# Patient Record
Sex: MALE | Race: WHITE | Employment: FULL TIME | URBAN - METROPOLITAN AREA
[De-identification: names, ages, dates, MRNs, and addresses within clinical notes are randomized per-mention and may not be internally consistent; named-entity substitution may affect disease eponyms.]

---

## 2017-04-17 RX ORDER — CEFAZOLIN SODIUM IN 0.9 % NACL 2 G/50 ML
2 INTRAVENOUS SOLUTION, PIGGYBACK (ML) INTRAVENOUS ONCE
Status: CANCELLED | OUTPATIENT
Start: 2017-04-17 | End: 2017-04-17

## 2017-04-17 NOTE — H&P
[facility]  Pre Operative History and Physical Exam    Patient ID:  Soniya Livingston  [id number]  42 y.o.  1950    Today: April 17, 2017          CC:  Left  Knee pain    HPI:   The patient has end stage arthritis of the left knee. The patient was evaluated and examined during a consultation prior to today. The patient complains of knee pain with activities, reports pain as mostly occurring along the joint lines, reports stiffness of the knee with prolonged inactivity, and swelling/pain at the end of the day and after increased physical activity. The pain affects the patients activities of daily living and quality of life. The patient has attempted and exhausted conservative treatment. There have been no changes to the patient's orthopedic condition since the last office visit. Past Medical History:  No past medical history on file. Past Surgical History:  No past surgical history on file. Medications:     Prior to Admission medications    Not on File       Family History:   No family history on file. Social History:      Social History   Substance Use Topics    Smoking status: Not on file    Smokeless tobacco: Not on file    Alcohol use Not on file         Allergies: Allergies not on file     Vitals: There were no vitals taken for this visit. Objective:         General: No Acute distress                   HEENT: Normocephalic/atramatic                   Lungs:  Breathing non-labored                   Heart:   RRR                    Abdomen: soft       Extremities:  Pain with ROM of the left knee. Trace effusion. Crepitus present. Distally the patient shows no neurologic deficit. Antalgic gait appreciated. Meds:   No current outpatient prescriptions on file. No current facility-administered medications for this encounter. There is no problem list on file for this patient. Assessment:   1.  Arthritis of the Left knee    Plan:   The patient has failed previous conservative treatment for this condition including anti-inflammatories, injections and lifestyle modifications. The necessity for joint replacement is present. Risks, benefits, alternatives and possible complications of left knee arthroplasty have been discussed with the patient including but not limited to infection, bleeding, damage to nerves and/or blood vessels, stiffness of the knee after surgery, potential for patellar maltracking, potential for fracture both intra-op and post-op, polyethylene wear, implant loosening, risk for revision surgery should any of the above occur, venous thrombo-embolism including deep vein thrombosis and pulmonary embolism, and potential for continued pain after surgery. We have also previously discussed the potential of morbidity and mortality associated with, but not limited to, the actual surgical procedure, anesthesia, prior medical conditions, and/or the administration of medications perioperatively. The patient has been given the opportunity to ask questions all of which have been answered and the patient wishes to proceed. The patient will be admitted the day of surgery for left total knee replacement.         Signed By: Radha Lund MD  April 17, 2017

## 2017-04-24 ENCOUNTER — HOSPITAL ENCOUNTER (OUTPATIENT)
Dept: PHYSICAL THERAPY | Age: 67
Discharge: HOME | End: 2017-04-24
Payer: COMMERCIAL

## 2017-04-24 ENCOUNTER — HOSPITAL ENCOUNTER (OUTPATIENT)
Dept: SURGERY | Age: 67
Discharge: HOME | End: 2017-04-24
Payer: COMMERCIAL

## 2017-04-24 VITALS
BODY MASS INDEX: 32.23 KG/M2 | HEIGHT: 72 IN | TEMPERATURE: 96.8 F | HEART RATE: 54 BPM | WEIGHT: 238 LBS | OXYGEN SATURATION: 96 % | RESPIRATION RATE: 18 BRPM | SYSTOLIC BLOOD PRESSURE: 119 MMHG | DIASTOLIC BLOOD PRESSURE: 61 MMHG

## 2017-04-24 PROBLEM — R94.4 DECREASED GFR: Status: ACTIVE | Noted: 2017-04-24

## 2017-04-24 PROBLEM — R79.89 ELEVATED SERUM CREATININE: Status: ACTIVE | Noted: 2017-04-24

## 2017-04-24 LAB
ALBUMIN SERPL BCP-MCNC: 4.1 G/DL (ref 3.2–4.6)
ANION GAP BLD CALC-SCNC: 8 MMOL/L (ref 7–16)
APPEARANCE UR: CLEAR
APTT PPP: 23.8 SEC (ref 23.5–31.7)
ATRIAL RATE: 54 BPM
BACTERIA SPEC CULT: NORMAL
BASOPHILS # BLD AUTO: 0.1 K/UL (ref 0–0.2)
BASOPHILS # BLD: 1 % (ref 0–2)
BILIRUB UR QL: NEGATIVE
BUN SERPL-MCNC: 26 MG/DL (ref 8–23)
CALCIUM SERPL-MCNC: 9.5 MG/DL (ref 8.3–10.4)
CALCULATED P AXIS, ECG09: 20 DEGREES
CALCULATED R AXIS, ECG10: 64 DEGREES
CALCULATED T AXIS, ECG11: 54 DEGREES
CHLORIDE SERPL-SCNC: 104 MMOL/L (ref 98–107)
CO2 SERPL-SCNC: 28 MMOL/L (ref 21–32)
COLOR UR: YELLOW
CREAT SERPL-MCNC: 2.01 MG/DL (ref 0.8–1.5)
DIAGNOSIS, 93000: NORMAL
DIFFERENTIAL METHOD BLD: ABNORMAL
EOSINOPHIL # BLD: 0.1 K/UL (ref 0–0.8)
EOSINOPHIL NFR BLD: 1 % (ref 0.5–7.8)
ERYTHROCYTE [DISTWIDTH] IN BLOOD BY AUTOMATED COUNT: 12.9 % (ref 11.9–14.6)
EST. AVERAGE GLUCOSE BLD GHB EST-MCNC: 126 MG/DL
GLUCOSE SERPL-MCNC: 95 MG/DL (ref 65–100)
GLUCOSE UR STRIP.AUTO-MCNC: NEGATIVE MG/DL
HBA1C MFR BLD: 6 % (ref 4.8–6)
HCT VFR BLD AUTO: 41.3 % (ref 41.1–50.3)
HGB BLD-MCNC: 13.7 G/DL (ref 13.6–17.2)
HGB UR QL STRIP: NEGATIVE
IMM GRANULOCYTES # BLD: 0 K/UL (ref 0–0.5)
IMM GRANULOCYTES NFR BLD AUTO: 0.3 % (ref 0–5)
INR PPP: 1 (ref 0.9–1.2)
KETONES UR QL STRIP.AUTO: NEGATIVE MG/DL
LEUKOCYTE ESTERASE UR QL STRIP.AUTO: NEGATIVE
LYMPHOCYTES # BLD AUTO: 35 % (ref 13–44)
LYMPHOCYTES # BLD: 3.1 K/UL (ref 0.5–4.6)
MCH RBC QN AUTO: 28.7 PG (ref 26.1–32.9)
MCHC RBC AUTO-ENTMCNC: 33.2 G/DL (ref 31.4–35)
MCV RBC AUTO: 86.6 FL (ref 79.6–97.8)
MONOCYTES # BLD: 1.3 K/UL (ref 0.1–1.3)
MONOCYTES NFR BLD AUTO: 14 % (ref 4–12)
NEUTS SEG # BLD: 4.3 K/UL (ref 1.7–8.2)
NEUTS SEG NFR BLD AUTO: 49 % (ref 43–78)
NITRITE UR QL STRIP.AUTO: NEGATIVE
P-R INTERVAL, ECG05: 158 MS
PH UR STRIP: 6.5 [PH] (ref 5–9)
PLATELET # BLD AUTO: 218 K/UL (ref 150–450)
PMV BLD AUTO: 12.2 FL (ref 10.8–14.1)
POTASSIUM SERPL-SCNC: 4.1 MMOL/L (ref 3.5–5.1)
PROT UR STRIP-MCNC: NEGATIVE MG/DL
PROTHROMBIN TIME: 10.9 SEC (ref 9.6–12)
Q-T INTERVAL, ECG07: 410 MS
QRS DURATION, ECG06: 96 MS
QTC CALCULATION (BEZET), ECG08: 388 MS
RBC # BLD AUTO: 4.77 M/UL (ref 4.23–5.67)
SERVICE CMNT-IMP: NORMAL
SODIUM SERPL-SCNC: 140 MMOL/L (ref 136–145)
SP GR UR REFRACTOMETRY: 1.01 (ref 1–1.02)
UROBILINOGEN UR QL STRIP.AUTO: 1 EU/DL (ref 0.2–1)
VENTRICULAR RATE, ECG03: 54 BPM
WBC # BLD AUTO: 8.9 K/UL (ref 4.3–11.1)

## 2017-04-24 PROCEDURE — 80048 BASIC METABOLIC PNL TOTAL CA: CPT | Performed by: ORTHOPAEDIC SURGERY

## 2017-04-24 PROCEDURE — 80307 DRUG TEST PRSMV CHEM ANLYZR: CPT | Performed by: ORTHOPAEDIC SURGERY

## 2017-04-24 PROCEDURE — 82040 ASSAY OF SERUM ALBUMIN: CPT | Performed by: ORTHOPAEDIC SURGERY

## 2017-04-24 PROCEDURE — 97161 PT EVAL LOW COMPLEX 20 MIN: CPT

## 2017-04-24 PROCEDURE — 85730 THROMBOPLASTIN TIME PARTIAL: CPT | Performed by: ORTHOPAEDIC SURGERY

## 2017-04-24 PROCEDURE — 85025 COMPLETE CBC W/AUTO DIFF WBC: CPT | Performed by: ORTHOPAEDIC SURGERY

## 2017-04-24 PROCEDURE — 93005 ELECTROCARDIOGRAM TRACING: CPT | Performed by: ORTHOPAEDIC SURGERY

## 2017-04-24 PROCEDURE — 81003 URINALYSIS AUTO W/O SCOPE: CPT | Performed by: ORTHOPAEDIC SURGERY

## 2017-04-24 PROCEDURE — 83036 HEMOGLOBIN GLYCOSYLATED A1C: CPT | Performed by: ORTHOPAEDIC SURGERY

## 2017-04-24 PROCEDURE — 36415 COLL VENOUS BLD VENIPUNCTURE: CPT | Performed by: ORTHOPAEDIC SURGERY

## 2017-04-24 PROCEDURE — 77030027138 HC INCENT SPIROMETER -A

## 2017-04-24 PROCEDURE — 85610 PROTHROMBIN TIME: CPT | Performed by: ORTHOPAEDIC SURGERY

## 2017-04-24 PROCEDURE — 87641 MR-STAPH DNA AMP PROBE: CPT | Performed by: ORTHOPAEDIC SURGERY

## 2017-04-24 RX ORDER — EZETIMIBE 10 MG/1
10 TABLET ORAL DAILY
COMMUNITY

## 2017-04-24 RX ORDER — OLOPATADINE HYDROCHLORIDE 665 UG/1
2 SPRAY NASAL 2 TIMES DAILY
COMMUNITY

## 2017-04-24 RX ORDER — LISINOPRIL 10 MG/1
10 TABLET ORAL DAILY
COMMUNITY

## 2017-04-24 RX ORDER — ATORVASTATIN CALCIUM 40 MG/1
40 TABLET, FILM COATED ORAL
COMMUNITY

## 2017-04-24 RX ORDER — SULINDAC 150 MG/1
150 TABLET ORAL 2 TIMES DAILY
COMMUNITY

## 2017-04-24 RX ORDER — FLUOXETINE HYDROCHLORIDE 20 MG/1
20 CAPSULE ORAL DAILY
COMMUNITY

## 2017-04-24 RX ORDER — ISOSORBIDE MONONITRATE 30 MG/1
30 TABLET, EXTENDED RELEASE ORAL DAILY
COMMUNITY

## 2017-04-24 RX ORDER — FENOFIBRATE 160 MG/1
160 TABLET ORAL DAILY
COMMUNITY

## 2017-04-24 RX ORDER — METOPROLOL TARTRATE 25 MG/1
25 TABLET, FILM COATED ORAL 2 TIMES DAILY
COMMUNITY

## 2017-04-24 RX ORDER — ASPIRIN 81 MG/1
81 TABLET ORAL DAILY
COMMUNITY
End: 2017-05-19

## 2017-04-24 NOTE — PROGRESS NOTES
Regina Ferreira  : (40 y.o.) [facility]  [address], [address]  [phone]       Physical Therapy Prehab Plan of Treatment and Evaluation Summary:2017    ICD-10: Treatment Diagnosis:   · Pain in Left Knee (M25.562)  · Stiffness of Left Knee, Not elsewhere classified (M25.662)  · Difficulty in walking, Not elsewhere classified (R26.2)  Precautions/Allergies:   Neosporin [hydrocortisone] and Pcn [penicillins]  MEDICAL/REFERRING DIAGNOSIS:  Unilateral primary osteoarthritis, left knee [M17.12]  REFERRING PHYSICIAN: Gregory Cassidy MD  DATE OF SURGERY: 17   Assessment:   Comments:  Pt. Plans to go home with spouse. PROBLEM LIST (Impacting functional limitations):  Mr. Joselo Alexandre presents with the following left lower extremity(s) problems:  1. Strength  2. Range of Motion  3. Home Exercise Program  4. Pain   INTERVENTIONS PLANNED:  1. Home Exercise Program  2. Educational Discussion     TREATMENT PLAN: Effective Dates: 2017 TO 2017. Frequency/Duration: Patient to continue to perform home exercise program at least twice per day up until his surgery. GOALS: (Goals have been discussed and agreed upon with patient.)  Discharge Goals: Time Frame: 1 Day  1. Patient will demonstrate independence with a home exercise program designed to increase strength, range of motion and pain control to minimize functional deficits and optimize patient for total joint replacement. Rehabilitation Potential For Stated Goals: Good  Regarding Cherelle Rousseau's therapy, I certify that the treatment plan above will be carried out by a therapist or under their direction.   Thank you for this referral,  Elkin Funk, PT               HISTORY:   Present Symptoms:  Pain Intensity 1:  (10 at worst)  Pain Location 1: Knee   History of Present Injury/Illness (Reason for Referral):  Medical/Referring Diagnosis: Unilateral primary osteoarthritis, left knee [M17.12] Past Medical History/Comorbidities:   Mr. Saima Sánchez  has a past medical history of CAD (coronary artery disease); Depression; Hypercholesteremia; Hypertension; and Osteoarthritis. He also has no past medical history of Adverse effect of anesthesia; Difficult intubation; Malignant hyperthermia due to anesthesia; Nausea & vomiting; or Pseudocholinesterase deficiency. Mr. Saima Sánchez  has a past surgical history that includes urological (2013) and colonoscopy. Social History/Living Environment:   Home Environment: Private residence  # Steps to Enter: 2  Rails to Enter: Yes  Hand Rails : Right  One/Two Story Residence: One story  Living Alone: No  Support Systems: Spouse/Significant Other/Partner  Patient Expects to be Discharged to[de-identified] Private residence  Current DME Used/Available at Home: Com[employer], bedside  Tub or Shower Type: Shower  Work/Activity:  sales  Dominant Side:  RIGHT  Current Medications:  See Pre-assessment nursing note   Number of Personal Factors/Comorbidities that affect the Plan of Care: 0: LOW COMPLEXITY   EXAMINATION:   ADLs (Current Functional Status):   Ambulation:  [x] Independent  [] Walk Indoors Only  [] Walk Outdoors  [] Use Assistive Device  [] Use Wheelchair Only Dressing:  [x] [address] from Someone for:  [] Sock/Shoes  [] Pants  [] Everything   Bathing/Showering:   [x] Independent  [] Requires Assistance from Someone  [] [address]:  [x] Routine house and yard work  [] Light Housework Only  [] None   Observation/Orthostatic Postural Assessment:   Exceptions to WDLGenu varus left  ROM/Flexibility:   Gross Assessment: Yes  AROM: Within functional limits (right LE)                LLE Assessment  LLE Assessment (WDL): Exception to WDL  LLE AROM  L Knee Flexion: 125  L Knee Extension: 3          Strength:   Gross Assessment: Yes  Strength:  Within functional limits (right LE)       LLE Strength  L Knee Flexion: 4  L Knee Extension: 4          Functional Mobility:    Gross Assessment: Yes    Gait Description (WDL): Exceptions to WDL  Stand to Sit: Independent  Sit to Stand: Independent  Bed to Chair: Independent  Distance (ft): 250 Feet (ft)  Ambulation - Level of Assistance: Independent  Stance: Left decreased  Gait Abnormalities: Antalgic          Balance:    Sitting: Intact  Standing: Intact   Body Structures Involved:  1. Bones  2. Joints  3. Muscles  4. Ligaments Body Functions Affected:  1. Movement Related Activities and Participation Affected:  1. Mobility   Number of elements that affect the Plan of Care: 1-2: LOW COMPLEXITY   CLINICAL PRESENTATION:   Presentation: Stable and uncomplicated: LOW COMPLEXITY   CLINICAL DECISION MAKING:   Outcome Measure: Tool Used: Lower Extremity Functional Scale (LEFS)  Score:  Initial: 41/80 Most Recent: X/80 (Date: -- )   Interpretation of Score: 20 questions each scored on a 5 point scale with 0 representing \"extreme difficulty or unable to perform\" and 4 representing \"no difficulty\". The lower the score, the greater the functional disability. 80/80 represents no disability. Minimal detectable change is 9 points. Score 80 79-65 64-49 48-33 32-17 16-1 0   Modifier CH CI CJ CK CL CM CN     ? Mobility - Walking and Moving Around:     - CURRENT STATUS: CK - 40%-59% impaired, limited or restricted    - GOAL STATUS: CK - 40%-59% impaired, limited or restricted    - D/C STATUS:  CK - 40%-59% impaired, limited or restricted  Medical Necessity:   · Mr. Keo Ashley is expected to optimize his lower extremity strength and ROM in preparation for joint replacement surgery. Reason for Services/Other Comments:  · Achieve baseline assesment of musculoskeletal system, functional mobility and home environment. , educate in PT HEP in preparation for surgery, educate in hospital plan of care.    Use of outcome tool(s) and clinical judgement create a POC that gives a: Clear prediction of patient's progress: LOW COMPLEXITY TREATMENT:   Treatment/Session Assessment:  Patient was instructed in PT- HEP to increase strength and ROM in LEs. Answered all questions. · Post session pain:  No complaints  · Compliance with Program/Exercises: compliant most of the time.   Total Treatment Duration:  PT Patient Time In/Time Out  Time In: 1140  Time Out: [id number] Kristie Rosales

## 2017-04-24 NOTE — PERIOP NOTES
Dr. Allen Sousa (anesthesia) reviewed elevated Creatinine results. Per Dr. Allen Sousa patient is to be seen by PCP, Davina Brooks, PRIOR to surgery to have elevated Creatinine addressed. Voicemail left with Gideon Amos, assistant to Dr. Barak Arnett, stating the above.

## 2017-04-24 NOTE — PERIOP NOTES
Patient verified name, , and surgery as listed in [facility]. Type 3 surgery, PAT joint assessment complete. Labs per surgeon: mrsa/mssa swab, nicotine/cotinine, hemoglobin A1c, Albumin, ua, bmp, ptt, pt/inr, cbc, T&S DOS; results pending. Nicotine/cotinine results pending-to be followed up by charge nurse. Labs per anesthesia protocol: All required lab work included in surgeon's orders. EKG: completed 17 results within anesthesia limits. Previous cardiology note (10/22/15) placed on chart for anesthesia reference. Previous EKG, Echo report, and stress report requested from [facility] to be faxed to [phone]. Patient has an appointment scheduled with [facility] on 5/3/17 at 1400 for Cardiac Clearance. Charge nurse to print clearance note and place on chart. Hibiclens and instructions given per hospital policy. Nasal Swab collected per MD order and instructions for Mupirocin nasal ointment if required. Patient provided with handouts including Guide to Surgery, Pain Management, Hand Hygiene, Blood Transfusion Education, and [facility] Brochure. Patient answered medical/surgical history questions at their best of ability. All prior to admission medications documented in [facility]. Original medication prescription bottle NOT visualized during patient appointment. Patient instructed to hold all vitamins 7 days prior to surgery and NSAIDS 5 days prior to surgery. Medications to be held prior to surgery: Sulindac. Patient instructed to continue previous medications as prescribed prior to surgery and to take the following medications the day of surgery according to anesthesia guidelines with a small sip of water: Metoprolol, Olopatadine nasal spray, Fluoxetine, Isosorbide, and 81 mg ASA. Patient instructed to bring nasal spray, Sulindac, and Myrbetriq (in the original bottles) to the hospital on the DOS and give to nurse. Patient taught back and verbalized understanding.

## 2017-04-24 NOTE — PROGRESS NOTES
04/24/17 1200   Oxygen Therapy   O2 Sat (%) 97 %   Pulse via Oximetry 57 beats per minute   O2 Device Room air   Pre-Treatment   Breath Sounds Bilateral Clear   Pre FEV1 (liters) 3.1 liters   % Predicted 84   Incentive Spirometry Treatment   Actual Volume (ml) 3500 ml     Initial respiratory Assessment completed with pt. Pt was interviewed and evaluated in [facility] prior to surgery. Patient ID:  Azael Padron  [id number]  23 y.o.  1950  Surgeon: Vale Baugh  Date of Surgery: 5/17/2017  Procedure: Total Left Knee Arthroplasty  Primary Care Physician: Raven Hamilton [phone]  Specialists:                                  Pt instructed in the use of Incentive Spirometry. Pt instructed to bring Incentive Spirometer back on date of surgery & to start using Is upon return to pt room.     Pt taught proper cough technique      History of smoking:   PIPE AND CIGAR 10 TIMES PER DAY     Quit date:18 YEARS AGO    Secondhand smoke:PARENTS      Past procedures with Oxygen desaturation:NONE    Past Medical History:   Diagnosis Date    Benign prostatic hyperplasia     Followed y Dr. Ravi Ellis CAD (coronary artery disease)     followed by [facility]     Depression     Diastolic dysfunction     Grade 1     Elevated serum creatinine 04/24/2017    Creatinine 2.01    Former smoker     H/O echocardiogram 07/24/2014    EF 60-65%    Hypercholesteremia     Hypertension     Osteoarthritis     Overactive bladder                                     ENT:SINUS                                                                                                                      Respiratory history:                                  DENIES SOB                                  Respiratory meds:                                                         FAMILY PRESENT:             YES       -WIFE                                        PAST SLEEP STUDY:              NO  HX OF JESSICA:                              NO JESSICA assessment:                                               SLEEPS ON   BACK                                                    PHYSICAL EXAM   Body mass index is 32.28 kg/(m^2).    Visit Vitals    /61    Pulse (!) 54    Temp 96.8 °F (36 °C)    Resp 18    Ht 6' (1.829 m)    Wt 108 kg (238 lb)    SpO2 96%    BMI 32.28 kg/m2     Neck circumference:  44    cm    Loud snoring:YES      Witnessed apnea or wakening gasping or choking:,DENIES,     Awakens with headaches:DENIES    Morning or daytime tiredness/ sleepiness: DENIES  - NAPS ONCE A DAY FOR 30 -40 MIN    Dry mouth or sore throat in morning:DENIES    Freidman stage:4    SACS score:25    STOP/BAN      Sleepiness Scale:     Sitting and reading 3    Watching TV 2    Sitting inactive in a public place 0    As a passenger in a car for an hour without a break 3    Lying down to rest in the afternoon when circumstances Permits 3    Sitting and talking to someone 0    Sitting quietly after lunch without alcohol 3    In a car, while stopped for a few minutes 0    Total :  14                          CPAP:NONE               CONT SAT HS         Referrals:    Pt. Phone Number:

## 2017-04-24 NOTE — PERIOP NOTES
Recent Results (from the past 12 hour(s))   CBC WITH AUTOMATED DIFF    Collection Time: 04/24/17 11:59 AM   Result Value Ref Range    WBC 8.9 4.3 - 11.1 K/uL    RBC 4.77 4.23 - 5.67 M/uL    HGB 13.7 13.6 - 17.2 g/dL    HCT 41.3 41.1 - 50.3 %    MCV 86.6 79.6 - 97.8 FL    MCH 28.7 26.1 - 32.9 PG    MCHC 33.2 31.4 - 35.0 g/dL    RDW 12.9 11.9 - 14.6 %    PLATELET 283 375 - 824 K/uL    MPV 12.2 10.8 - 14.1 FL    DF AUTOMATED      NEUTROPHILS 49 43 - 78 %    LYMPHOCYTES 35 13 - 44 %    MONOCYTES 14 (H) 4.0 - 12.0 %    EOSINOPHILS 1 0.5 - 7.8 %    BASOPHILS 1 0.0 - 2.0 %    IMMATURE GRANULOCYTES 0.3 0.0 - 5.0 %    ABS. NEUTROPHILS 4.3 1.7 - 8.2 K/UL    ABS. LYMPHOCYTES 3.1 0.5 - 4.6 K/UL    ABS. MONOCYTES 1.3 0.1 - 1.3 K/UL    ABS. EOSINOPHILS 0.1 0.0 - 0.8 K/UL    ABS. BASOPHILS 0.1 0.0 - 0.2 K/UL    ABS. IMM.  GRANS. 0.0 0.0 - 0.5 K/UL   PROTHROMBIN TIME + INR    Collection Time: 04/24/17 11:59 AM   Result Value Ref Range    Prothrombin time 10.9 9.6 - 12.0 sec    INR 1.0 0.9 - 1.2     PTT    Collection Time: 04/24/17 11:59 AM   Result Value Ref Range    aPTT 23.8 23.5 - 20.1 SEC   METABOLIC PANEL, BASIC    Collection Time: 04/24/17 11:59 AM   Result Value Ref Range    Sodium 140 136 - 145 mmol/L    Potassium 4.1 3.5 - 5.1 mmol/L    Chloride 104 98 - 107 mmol/L    CO2 28 21 - 32 mmol/L    Anion gap 8 7 - 16 mmol/L    Glucose 95 65 - 100 mg/dL    BUN 26 (H) 8 - 23 MG/DL    Creatinine 2.01 (H) 0.8 - 1.5 MG/DL    GFR est AA 43 (L) >60 ml/min/1.73m2    GFR est non-AA 36 (L) >60 ml/min/1.73m2    Calcium 9.5 8.3 - 10.4 MG/DL   URINALYSIS W/ RFLX MICROSCOPIC    Collection Time: 04/24/17 11:59 AM   Result Value Ref Range    Color YELLOW      Appearance CLEAR      Specific gravity 1.013 1.001 - 1.023      pH (UA) 6.5 5.0 - 9.0      Protein NEGATIVE  NEG mg/dL    Glucose NEGATIVE  mg/dL    Ketone NEGATIVE  NEG mg/dL    Bilirubin NEGATIVE  NEG      Blood NEGATIVE  NEG      Urobilinogen 1.0 0.2 - 1.0 EU/dL    Nitrites NEGATIVE  NEG Leukocyte Esterase NEGATIVE  NEG     ALBUMIN    Collection Time: 04/24/17 11:59 AM   Result Value Ref Range    Albumin 4.1 3.2 - 4.6 g/dL   EKG, 12 LEAD, INITIAL    Collection Time: 04/24/17  1:32 PM   Result Value Ref Range    Ventricular Rate 54 BPM    Atrial Rate 54 BPM    P-R Interval 158 ms    QRS Duration 96 ms    Q-T Interval 410 ms    QTC Calculation (Bezet) 388 ms    Calculated P Axis 20 degrees    Calculated R Axis 64 degrees    Calculated T Axis 54 degrees    Diagnosis       Sinus bradycardia  Otherwise normal ECG  No previous ECGs available

## 2017-04-24 NOTE — PERIOP NOTES
Labs dated 4/24/17 routed via [address] [place] [address] to patients PCP, Ayden Bauer, per Dr. Gorge Lee request.

## 2017-04-24 NOTE — PERIOP NOTES
Creatinine results to be reviewed by anesthesia; previous CMP results (1/23/17) placed on chart for comparison. All other lab results within anesthesia limits. Elevated Creatinine reported to [facility] per protocol. No new orders received.

## 2017-04-24 NOTE — ADVANCED PRACTICE NURSE
Total Joint Surgery Preoperative Chart Review      Patient ID:  Junior Barr  [id number]  34 y.o.  1950  Surgeon: Dr Jeison Lindquist  Date of Surgery: 5/17/2017  Procedure: Total Left Knee Arthroplasty  Primary Care Physician: Tennille Trujillo [phone]  Specialty Physician(s):      Subjective:   Junior Barr is a 73 y.o. WHITE OR  male who presents for preoperative evaluation for Total Left Knee arthroplasty. This is a preoperative chart review note based on data collected by the nurse at the surgical Pre-Assessment visit. Past Medical History:   Diagnosis Date    Benign prostatic hyperplasia     Followed y Dr. Ann Briceño CAD (coronary artery disease)     [facility] [address] [facility]     Depression     Diastolic dysfunction     Grade 1     Elevated serum creatinine 04/24/2017    Creatinine 2.01    Former smoker     H/O echocardiogram 07/24/2014    EF 60-65%    Hypercholesteremia     Hypertension     Osteoarthritis     Overactive bladder       Past Surgical History:   Procedure Laterality Date    HX COLONOSCOPY      with polyp removal-benign     HX UROLOGICAL  2013    Greenlight laser of prostate. Family History   Problem Relation Age of Onset    Heart Disease Father     Hypertension Father       Social History   Substance Use Topics    Smoking status: Former Smoker     Packs/day: 1.00     Years: 20.00    Smokeless tobacco: Never Used      Comment: quit 1998     Alcohol use No       Prior to Admission medications    Medication Sig Start Date End Date Taking? Authorizing Provider   metoprolol tartrate (LOPRESSOR) 25 mg tablet Take 25 mg by mouth two (2) times a day. Take DOS per anesthesia protocol. Yes Historical Provider   sulindac (CLINORIL) 150 mg tablet Take 150 mg by mouth two (2) times a day. Non-formulary. Patient instructed to bring to the hospital on the DOS, in the original bottle, and give to nurse.    Yes Historical Provider   olopatadine (PATANASE) 0.6 % spry 2 Squirts by Both Nostrils route two (2) times a day. Non-formulary. Patient instructed to bring to the hospital on the DOS, in the original bottle, and give to nurse. Yes Historical Provider   mirabegron ER (MYRBETRIQ) 50 mg ER tablet Take 50 mg by mouth nightly. Non-formulary. Patient instructed to bring to the hospital on the DOS, in the original bottle, and give to nurse. Yes Historical Provider   atorvastatin (LIPITOR) 40 mg tablet Take 40 mg by mouth nightly. Yes Historical Provider   lisinopril (PRINIVIL, ZESTRIL) 10 mg tablet Take 10 mg by mouth daily. Yes Historical Provider   isosorbide mononitrate ER (IMDUR) 30 mg tablet Take 30 mg by mouth daily. Take DOS per anesthesia protocol. Yes Historical Provider   ezetimibe (ZETIA) 10 mg tablet Take 10 mg by mouth daily. Yes Historical Provider   fenofibrate (LOFIBRA) 160 mg tablet Take 160 mg by mouth daily. Yes Historical Provider   FLUoxetine (PROZAC) 20 mg capsule Take 20 mg by mouth daily. Take DOS per anesthesia protocol. Yes Historical Provider   aspirin delayed-release 81 mg tablet Take 81 mg by mouth daily. Take DOS per anesthesia protocol.    Yes Historical Provider     Allergies   Allergen Reactions    Neosporin [Hydrocortisone] Other (comments)     \"it has a reverse effect\"     Pcn [Penicillins] Other (comments)     Light headed and starts to pass out           Objective:     Physical Exam:   Patient Vitals for the past 24 hrs:   Temp Pulse Resp BP SpO2   04/24/17 1328 - - - 119/61 -   04/24/17 1326 96.8 °F (36 °C) (!) 54 18 - 96 %   04/24/17 1200 - - - - 97 %       ECG:    EKG Results     Procedure 720 Value Units Date/Time    EKG, 12 LEAD, INITIAL [[id number]] Collected:  04/24/17 1332    Order Status:  Completed Updated:  04/24/17 1442     Ventricular Rate 54 BPM      Atrial Rate 54 BPM      P-R Interval 158 ms      QRS Duration 96 ms      Q-T Interval 410 ms      QTC Calculation (Bezet) 388 ms      Calculated P Axis 20 degrees Calculated R Axis 64 degrees      Calculated T Axis 54 degrees      Diagnosis --     Sinus bradycardia  Otherwise normal ECG  No previous ECGs available  Confirmed by ST MELISSA PHELPS MD (), ANUPAM AGUILAR ([id number]) on 4/24/2017 2:42:24 PM            Data Review:   Labs:   Recent Results (from the past 24 hour(s))   CBC WITH AUTOMATED DIFF    Collection Time: 04/24/17 11:59 AM   Result Value Ref Range    WBC 8.9 4.3 - 11.1 K/uL    RBC 4.77 4.23 - 5.67 M/uL    HGB 13.7 13.6 - 17.2 g/dL    HCT 41.3 41.1 - 50.3 %    MCV 86.6 79.6 - 97.8 FL    MCH 28.7 26.1 - 32.9 PG    MCHC 33.2 31.4 - 35.0 g/dL    RDW 12.9 11.9 - 14.6 %    PLATELET 341 011 - 240 K/uL    MPV 12.2 10.8 - 14.1 FL    DF AUTOMATED      NEUTROPHILS 49 43 - 78 %    LYMPHOCYTES 35 13 - 44 %    MONOCYTES 14 (H) 4.0 - 12.0 %    EOSINOPHILS 1 0.5 - 7.8 %    BASOPHILS 1 0.0 - 2.0 %    IMMATURE GRANULOCYTES 0.3 0.0 - 5.0 %    ABS. NEUTROPHILS 4.3 1.7 - 8.2 K/UL    ABS. LYMPHOCYTES 3.1 0.5 - 4.6 K/UL    ABS. MONOCYTES 1.3 0.1 - 1.3 K/UL    ABS. EOSINOPHILS 0.1 0.0 - 0.8 K/UL    ABS. BASOPHILS 0.1 0.0 - 0.2 K/UL    ABS. IMM.  GRANS. 0.0 0.0 - 0.5 K/UL   PROTHROMBIN TIME + INR    Collection Time: 04/24/17 11:59 AM   Result Value Ref Range    Prothrombin time 10.9 9.6 - 12.0 sec    INR 1.0 0.9 - 1.2     PTT    Collection Time: 04/24/17 11:59 AM   Result Value Ref Range    aPTT 23.8 23.5 - 71.1 SEC   METABOLIC PANEL, BASIC    Collection Time: 04/24/17 11:59 AM   Result Value Ref Range    Sodium 140 136 - 145 mmol/L    Potassium 4.1 3.5 - 5.1 mmol/L    Chloride 104 98 - 107 mmol/L    CO2 28 21 - 32 mmol/L    Anion gap 8 7 - 16 mmol/L    Glucose 95 65 - 100 mg/dL    BUN 26 (H) 8 - 23 MG/DL    Creatinine 2.01 (H) 0.8 - 1.5 MG/DL    GFR est AA 43 (L) >60 ml/min/1.73m2    GFR est non-AA 36 (L) >60 ml/min/1.73m2    Calcium 9.5 8.3 - 10.4 MG/DL   URINALYSIS W/ RFLX MICROSCOPIC    Collection Time: 04/24/17 11:59 AM   Result Value Ref Range    Color YELLOW      Appearance CLEAR Specific gravity 1.013 1.001 - 1.023      pH (UA) 6.5 5.0 - 9.0      Protein NEGATIVE  NEG mg/dL    Glucose NEGATIVE  mg/dL    Ketone NEGATIVE  NEG mg/dL    Bilirubin NEGATIVE  NEG      Blood NEGATIVE  NEG      Urobilinogen 1.0 0.2 - 1.0 EU/dL    Nitrites NEGATIVE  NEG      Leukocyte Esterase NEGATIVE  NEG     ALBUMIN    Collection Time: 04/24/17 11:59 AM   Result Value Ref Range    Albumin 4.1 3.2 - 4.6 g/dL   HEMOGLOBIN A1C WITH EAG    Collection Time: 04/24/17 11:59 AM   Result Value Ref Range    Hemoglobin A1c 6.0 4.8 - 6.0 %    Est. average glucose 126 mg/dL   EKG, 12 LEAD, INITIAL    Collection Time: 04/24/17  1:32 PM   Result Value Ref Range    Ventricular Rate 54 BPM    Atrial Rate 54 BPM    P-R Interval 158 ms    QRS Duration 96 ms    Q-T Interval 410 ms    QTC Calculation (Bezet) 388 ms    Calculated P Axis 20 degrees    Calculated R Axis 64 degrees    Calculated T Axis 54 degrees    Diagnosis       Sinus bradycardia  Otherwise normal ECG  No previous ECGs available  Confirmed by ST MELISSA PHELPS MD (), ANUPAM AGUILAR ([id number]) on 4/24/2017 2:42:24 PM     MSSA/MRSA SC BY PCR, NASAL SWAB    Collection Time: 04/24/17  1:40 PM   Result Value Ref Range    Special Requests: NASAL O2      Culture result:        SA target not detected. A MRSA NEGATIVE, SA NEGATIVE test result does not preclude MRSA or SA nasal colonization. Problem List:  )  Patient Active Problem List   Diagnosis Code    Elevated serum creatinine R79.89    Decreased GFR R94.4       Total Joint Surgery Pre-Assessment Recommendations:         Patient will need to see PCP related to elevated creatinine. His creatinine has been elevated but not to this extent. Recommend continuous saturation monitoring hours of sleep, during hospitalization.           Signed By: CONNIE Joshi    April 24, 2017

## 2017-04-25 LAB
COTININE UR QL SCN: NEGATIVE NG/ML
DRUG SCREEN COMMENT:, 753798: NORMAL

## 2017-04-25 NOTE — PERIOP NOTES
Received call from [facility] at Dr. Munoz Bottom office. Per [facility] pt has been followed by [facility] for kidney issues. Requests pre assessment fax lab results from 4/24/17 assessment to Dr. Marycruz Juarez at [facility]. Pt to be evaluated by Dr. Marycruz Juarez prior to surgery. Recent Results (from the past 24 hour(s))   CBC WITH AUTOMATED DIFF    Collection Time: 04/24/17 11:59 AM   Result Value Ref Range    WBC 8.9 4.3 - 11.1 K/uL    RBC 4.77 4.23 - 5.67 M/uL    HGB 13.7 13.6 - 17.2 g/dL    HCT 41.3 41.1 - 50.3 %    MCV 86.6 79.6 - 97.8 FL    MCH 28.7 26.1 - 32.9 PG    MCHC 33.2 31.4 - 35.0 g/dL    RDW 12.9 11.9 - 14.6 %    PLATELET 684 106 - 052 K/uL    MPV 12.2 10.8 - 14.1 FL    DF AUTOMATED      NEUTROPHILS 49 43 - 78 %    LYMPHOCYTES 35 13 - 44 %    MONOCYTES 14 (H) 4.0 - 12.0 %    EOSINOPHILS 1 0.5 - 7.8 %    BASOPHILS 1 0.0 - 2.0 %    IMMATURE GRANULOCYTES 0.3 0.0 - 5.0 %    ABS. NEUTROPHILS 4.3 1.7 - 8.2 K/UL    ABS. LYMPHOCYTES 3.1 0.5 - 4.6 K/UL    ABS. MONOCYTES 1.3 0.1 - 1.3 K/UL    ABS. EOSINOPHILS 0.1 0.0 - 0.8 K/UL    ABS. BASOPHILS 0.1 0.0 - 0.2 K/UL    ABS. IMM.  GRANS. 0.0 0.0 - 0.5 K/UL   PROTHROMBIN TIME + INR    Collection Time: 04/24/17 11:59 AM   Result Value Ref Range    Prothrombin time 10.9 9.6 - 12.0 sec    INR 1.0 0.9 - 1.2     PTT    Collection Time: 04/24/17 11:59 AM   Result Value Ref Range    aPTT 23.8 23.5 - 80.3 SEC   METABOLIC PANEL, BASIC    Collection Time: 04/24/17 11:59 AM   Result Value Ref Range    Sodium 140 136 - 145 mmol/L    Potassium 4.1 3.5 - 5.1 mmol/L    Chloride 104 98 - 107 mmol/L    CO2 28 21 - 32 mmol/L    Anion gap 8 7 - 16 mmol/L    Glucose 95 65 - 100 mg/dL    BUN 26 (H) 8 - 23 MG/DL    Creatinine 2.01 (H) 0.8 - 1.5 MG/DL    GFR est AA 43 (L) >60 ml/min/1.73m2    GFR est non-AA 36 (L) >60 ml/min/1.73m2    Calcium 9.5 8.3 - 10.4 MG/DL   URINALYSIS W/ RFLX MICROSCOPIC    Collection Time: 04/24/17 11:59 AM   Result Value Ref Range    Color YELLOW      Appearance CLEAR      Specific gravity 1.013 1.001 - 1.023      pH (UA) 6.5 5.0 - 9.0      Protein NEGATIVE  NEG mg/dL    Glucose NEGATIVE  mg/dL    Ketone NEGATIVE  NEG mg/dL    Bilirubin NEGATIVE  NEG      Blood NEGATIVE  NEG      Urobilinogen 1.0 0.2 - 1.0 EU/dL    Nitrites NEGATIVE  NEG      Leukocyte Esterase NEGATIVE  NEG     ALBUMIN    Collection Time: 04/24/17 11:59 AM   Result Value Ref Range    Albumin 4.1 3.2 - 4.6 g/dL   HEMOGLOBIN A1C WITH EAG    Collection Time: 04/24/17 11:59 AM   Result Value Ref Range    Hemoglobin A1c 6.0 4.8 - 6.0 %    Est. average glucose 126 mg/dL   NICOTINE/COTININE, UR, QT    Collection Time: 04/24/17 11:59 AM   Result Value Ref Range    Cotinine Screen, urine NEGATIVE  Pumfmg=954 ng/mL    Drug Screen Comment: Comment     EKG, 12 LEAD, INITIAL    Collection Time: 04/24/17  1:32 PM   Result Value Ref Range    Ventricular Rate 54 BPM    Atrial Rate 54 BPM    P-R Interval 158 ms    QRS Duration 96 ms    Q-T Interval 410 ms    QTC Calculation (Bezet) 388 ms    Calculated P Axis 20 degrees    Calculated R Axis 64 degrees    Calculated T Axis 54 degrees    Diagnosis       Sinus bradycardia  Otherwise normal ECG  No previous ECGs available  Confirmed by ST MELISSA PHELPS MD (), ANUPAM AGUILAR ([id number]) on 4/24/2017 2:42:24 PM     MSSA/MRSA SC BY PCR, NASAL SWAB    Collection Time: 04/24/17  1:40 PM   Result Value Ref Range    Special Requests: NASAL O2      Culture result:        SA target not detected. A MRSA NEGATIVE, SA NEGATIVE test result does not preclude MRSA or SA nasal colonization.

## 2017-04-26 NOTE — PERIOP NOTES
Nicotine level =neg    Cotinine Screen, urine NEGATIVE   Ltxvbc=446 ng/mL Final   Drug Screen Comment: Comment

## 2017-05-04 NOTE — PERIOP NOTES
Pt evaluated by [facility] 5/3/17 by Dr. Kory Michele. Per office note 5/3/17: pt \" is an acceptable risk for surgery from a cardiac standpoint. No further cardiac testing is indicated at this time\". Note placed on chart. [facility] to verify pt has had pre op appointment to f/u elevated creatinine during pre assessment. Left detailed voicemail message for  to fax most recent office note and lab results to [phone]. Also asked to return call to [facility] if pt has not been evaluated by nephology as yet. Also noted in EMR office note dated 2/13/17 from Dr. Bridgette Blas (urology). In his office note he included recent lab results:     7/24/15  Creat=1.45  1/29/16   Creat= 1.43  7/18/16  Creat = 1.90  1/23/17  Creat= 1.52    Note placed on chart for reference.

## 2017-05-10 NOTE — PERIOP NOTES
Pt evaluated by [facility] 5/8/17. Per office note, pt with history of kidney dysfunction since 2007 with elevated creat 1.4. Creat increased to 2.0. Baseline over the last year has been 1.4-1.6. Kat Rice labs today (5/8/17). Stop sulindac. Repeat labs Thursday (5/11/17). Pt was told if creatinine remained 2 or higher, put off surgery until his creatinine declines back to his baseline. If creat better Thursday, my proceed with surgery. Flagged chart for Charge RN to follow up with repeat creat level 5/11/17.

## 2017-05-10 NOTE — PERIOP NOTES
[facility] medical records, left detailed voicemail message requesting office note from 5/8/17 and any lab reports from 5/8/17 be faxed to [phone] for anesthesia review pre op.

## 2017-05-12 NOTE — PERIOP NOTES
Received BMP done 5/11/17 at [facility]. Creat back at baseline (1.46). OK to proceed. Chart to 3rd ortho.

## 2017-05-16 ENCOUNTER — ANESTHESIA EVENT (OUTPATIENT)
Dept: SURGERY | Age: 67
DRG: 470 | End: 2017-05-16
Payer: COMMERCIAL

## 2017-05-17 ENCOUNTER — HOME HEALTH ADMISSION (OUTPATIENT)
Dept: HOME HEALTH SERVICES | Facility: HOME HEALTH | Age: 67
End: 2017-05-17
Payer: COMMERCIAL

## 2017-05-17 ENCOUNTER — ANESTHESIA (OUTPATIENT)
Dept: SURGERY | Age: 67
DRG: 470 | End: 2017-05-17
Payer: COMMERCIAL

## 2017-05-17 ENCOUNTER — HOSPITAL ENCOUNTER (INPATIENT)
Age: 67
LOS: 2 days | Discharge: HOME HEALTH | DRG: 470 | End: 2017-05-19
Attending: ORTHOPAEDIC SURGERY | Admitting: ORTHOPAEDIC SURGERY
Payer: COMMERCIAL

## 2017-05-17 PROBLEM — M17.9 OA (OSTEOARTHRITIS) OF KNEE: Status: ACTIVE | Noted: 2017-05-17

## 2017-05-17 LAB
ABO + RH BLD: NORMAL
BLOOD GROUP ANTIBODIES SERPL: NORMAL
GLUCOSE BLD STRIP.AUTO-MCNC: 94 MG/DL (ref 65–100)
HGB BLD-MCNC: 11.2 G/DL (ref 13.6–17.2)
SPECIMEN EXP DATE BLD: NORMAL

## 2017-05-17 PROCEDURE — 77030018836 HC SOL IRR NACL ICUM -A: Performed by: ORTHOPAEDIC SURGERY

## 2017-05-17 PROCEDURE — 77030012890

## 2017-05-17 PROCEDURE — 77030034479 HC ADH SKN CLSR PRINEO J&J -B: Performed by: ORTHOPAEDIC SURGERY

## 2017-05-17 PROCEDURE — 77030034849: Performed by: ORTHOPAEDIC SURGERY

## 2017-05-17 PROCEDURE — 77030033067 HC SUT PDO STRATFX SPIR J&J -B: Performed by: ORTHOPAEDIC SURGERY

## 2017-05-17 PROCEDURE — 74011000258 HC RX REV CODE- 258: Performed by: ORTHOPAEDIC SURGERY

## 2017-05-17 PROCEDURE — 86580 TB INTRADERMAL TEST: CPT | Performed by: ORTHOPAEDIC SURGERY

## 2017-05-17 PROCEDURE — 77030032490 HC SLV COMPR SCD KNE COVD -B

## 2017-05-17 PROCEDURE — 85018 HEMOGLOBIN: CPT | Performed by: ORTHOPAEDIC SURGERY

## 2017-05-17 PROCEDURE — 76942 ECHO GUIDE FOR BIOPSY: CPT | Performed by: ORTHOPAEDIC SURGERY

## 2017-05-17 PROCEDURE — 77010033678 HC OXYGEN DAILY

## 2017-05-17 PROCEDURE — 74011250636 HC RX REV CODE- 250/636: Performed by: ANESTHESIOLOGY

## 2017-05-17 PROCEDURE — 76060000035 HC ANESTHESIA 2 TO 2.5 HR: Performed by: ORTHOPAEDIC SURGERY

## 2017-05-17 PROCEDURE — 77030011640 HC PAD GRND REM COVD -A: Performed by: ORTHOPAEDIC SURGERY

## 2017-05-17 PROCEDURE — 94760 N-INVAS EAR/PLS OXIMETRY 1: CPT

## 2017-05-17 PROCEDURE — 0SRD0J9 REPLACEMENT OF LEFT KNEE JOINT WITH SYNTHETIC SUBSTITUTE, CEMENTED, OPEN APPROACH: ICD-10-PCS | Performed by: ORTHOPAEDIC SURGERY

## 2017-05-17 PROCEDURE — 77030003602 HC NDL NRV BLK BBMI -B: Performed by: ANESTHESIOLOGY

## 2017-05-17 PROCEDURE — 74011000250 HC RX REV CODE- 250

## 2017-05-17 PROCEDURE — 36415 COLL VENOUS BLD VENIPUNCTURE: CPT | Performed by: ORTHOPAEDIC SURGERY

## 2017-05-17 PROCEDURE — 77030031139 HC SUT VCRL2 J&J -A: Performed by: ORTHOPAEDIC SURGERY

## 2017-05-17 PROCEDURE — 77030007880 HC KT SPN EPDRL BBMI -B: Performed by: ANESTHESIOLOGY

## 2017-05-17 PROCEDURE — 76010010054 HC POST OP PAIN BLOCK: Performed by: ORTHOPAEDIC SURGERY

## 2017-05-17 PROCEDURE — 65270000029 HC RM PRIVATE

## 2017-05-17 PROCEDURE — 74011250637 HC RX REV CODE- 250/637: Performed by: ORTHOPAEDIC SURGERY

## 2017-05-17 PROCEDURE — 77030020782 HC GWN BAIR PAWS FLX 3M -B: Performed by: ANESTHESIOLOGY

## 2017-05-17 PROCEDURE — 77030003665 HC NDL SPN BBMI -A: Performed by: ANESTHESIOLOGY

## 2017-05-17 PROCEDURE — 74011250636 HC RX REV CODE- 250/636

## 2017-05-17 PROCEDURE — 74011000250 HC RX REV CODE- 250: Performed by: ORTHOPAEDIC SURGERY

## 2017-05-17 PROCEDURE — 74011250636 HC RX REV CODE- 250/636: Performed by: ORTHOPAEDIC SURGERY

## 2017-05-17 PROCEDURE — C1776 JOINT DEVICE (IMPLANTABLE): HCPCS | Performed by: ORTHOPAEDIC SURGERY

## 2017-05-17 PROCEDURE — 94762 N-INVAS EAR/PLS OXIMTRY CONT: CPT

## 2017-05-17 PROCEDURE — 74011000250 HC RX REV CODE- 250: Performed by: ANESTHESIOLOGY

## 2017-05-17 PROCEDURE — 77030036688 HC BLNKT CLD THER S2SG -B

## 2017-05-17 PROCEDURE — 76010000171 HC OR TIME 2 TO 2.5 HR INTENSV-TIER 1: Performed by: ORTHOPAEDIC SURGERY

## 2017-05-17 PROCEDURE — 76210000006 HC OR PH I REC 0.5 TO 1 HR: Performed by: ORTHOPAEDIC SURGERY

## 2017-05-17 PROCEDURE — 86900 BLOOD TYPING SEROLOGIC ABO: CPT | Performed by: ORTHOPAEDIC SURGERY

## 2017-05-17 PROCEDURE — 77030033138 HC SUT PGA STRATFX J&J -B: Performed by: ORTHOPAEDIC SURGERY

## 2017-05-17 PROCEDURE — 82962 GLUCOSE BLOOD TEST: CPT

## 2017-05-17 PROCEDURE — 77030002922 HC SUT FBRWRE ARTH -B: Performed by: ORTHOPAEDIC SURGERY

## 2017-05-17 PROCEDURE — 97165 OT EVAL LOW COMPLEX 30 MIN: CPT

## 2017-05-17 PROCEDURE — 74011000302 HC RX REV CODE- 302: Performed by: ORTHOPAEDIC SURGERY

## 2017-05-17 PROCEDURE — 77030006790 HC BLD SAW OSC ZIMM -B: Performed by: ORTHOPAEDIC SURGERY

## 2017-05-17 PROCEDURE — 77030003666 HC NDL SPINAL BD -A: Performed by: ORTHOPAEDIC SURGERY

## 2017-05-17 PROCEDURE — 77030013727 HC IRR FAN PULSVC ZIMM -B: Performed by: ORTHOPAEDIC SURGERY

## 2017-05-17 PROCEDURE — 97161 PT EVAL LOW COMPLEX 20 MIN: CPT

## 2017-05-17 DEVICE — BASEPLT TIB PC TRITNM SZ 7 -- TRIATHLON: Type: IMPLANTABLE DEVICE | Site: KNEE | Status: FUNCTIONAL

## 2017-05-17 DEVICE — IMPLANTABLE DEVICE: Type: IMPLANTABLE DEVICE | Site: KNEE | Status: FUNCTIONAL

## 2017-05-17 DEVICE — PAT ASYM MTL-BK 11MM SZ A38 -- TRIATHLON: Type: IMPLANTABLE DEVICE | Site: KNEE | Status: FUNCTIONAL

## 2017-05-17 RX ORDER — SODIUM CHLORIDE 9 MG/ML
INJECTION, SOLUTION INTRAVENOUS
Status: DISCONTINUED | OUTPATIENT
Start: 2017-05-17 | End: 2017-05-17 | Stop reason: HOSPADM

## 2017-05-17 RX ORDER — DIPHENHYDRAMINE HCL 25 MG
25 CAPSULE ORAL
Status: DISCONTINUED | OUTPATIENT
Start: 2017-05-17 | End: 2017-05-19 | Stop reason: HOSPADM

## 2017-05-17 RX ORDER — CEFAZOLIN SODIUM IN 0.9 % NACL 2 G/50 ML
2 INTRAVENOUS SOLUTION, PIGGYBACK (ML) INTRAVENOUS ONCE
Status: COMPLETED | OUTPATIENT
Start: 2017-05-17 | End: 2017-05-17

## 2017-05-17 RX ORDER — LIDOCAINE HYDROCHLORIDE 10 MG/ML
0.1 INJECTION INFILTRATION; PERINEURAL AS NEEDED
Status: DISCONTINUED | OUTPATIENT
Start: 2017-05-17 | End: 2017-05-17 | Stop reason: HOSPADM

## 2017-05-17 RX ORDER — ISOSORBIDE MONONITRATE 30 MG/1
30 TABLET, EXTENDED RELEASE ORAL DAILY
Status: DISCONTINUED | OUTPATIENT
Start: 2017-05-18 | End: 2017-05-19 | Stop reason: HOSPADM

## 2017-05-17 RX ORDER — SODIUM CHLORIDE, SODIUM LACTATE, POTASSIUM CHLORIDE, CALCIUM CHLORIDE 600; 310; 30; 20 MG/100ML; MG/100ML; MG/100ML; MG/100ML
50 INJECTION, SOLUTION INTRAVENOUS CONTINUOUS
Status: DISCONTINUED | OUTPATIENT
Start: 2017-05-17 | End: 2017-05-17 | Stop reason: HOSPADM

## 2017-05-17 RX ORDER — SODIUM CHLORIDE 9 MG/ML
100 INJECTION, SOLUTION INTRAVENOUS CONTINUOUS
Status: DISPENSED | OUTPATIENT
Start: 2017-05-17 | End: 2017-05-19

## 2017-05-17 RX ORDER — MORPHINE SULFATE 10 MG/ML
INJECTION, SOLUTION INTRAMUSCULAR; INTRAVENOUS AS NEEDED
Status: DISCONTINUED | OUTPATIENT
Start: 2017-05-17 | End: 2017-05-17 | Stop reason: HOSPADM

## 2017-05-17 RX ORDER — SODIUM CHLORIDE 0.9 % (FLUSH) 0.9 %
5-10 SYRINGE (ML) INJECTION AS NEEDED
Status: DISCONTINUED | OUTPATIENT
Start: 2017-05-17 | End: 2017-05-19 | Stop reason: HOSPADM

## 2017-05-17 RX ORDER — CEFAZOLIN SODIUM IN 0.9 % NACL 2 G/50 ML
2 INTRAVENOUS SOLUTION, PIGGYBACK (ML) INTRAVENOUS EVERY 8 HOURS
Status: COMPLETED | OUTPATIENT
Start: 2017-05-17 | End: 2017-05-18

## 2017-05-17 RX ORDER — SODIUM CHLORIDE, SODIUM LACTATE, POTASSIUM CHLORIDE, CALCIUM CHLORIDE 600; 310; 30; 20 MG/100ML; MG/100ML; MG/100ML; MG/100ML
75 INJECTION, SOLUTION INTRAVENOUS CONTINUOUS
Status: DISCONTINUED | OUTPATIENT
Start: 2017-05-17 | End: 2017-05-17 | Stop reason: HOSPADM

## 2017-05-17 RX ORDER — SODIUM CHLORIDE 0.9 % (FLUSH) 0.9 %
5-10 SYRINGE (ML) INJECTION EVERY 8 HOURS
Status: DISCONTINUED | OUTPATIENT
Start: 2017-05-17 | End: 2017-05-19 | Stop reason: HOSPADM

## 2017-05-17 RX ORDER — ROPIVACAINE HYDROCHLORIDE 2 MG/ML
INJECTION, SOLUTION EPIDURAL; INFILTRATION; PERINEURAL AS NEEDED
Status: DISCONTINUED | OUTPATIENT
Start: 2017-05-17 | End: 2017-05-17 | Stop reason: HOSPADM

## 2017-05-17 RX ORDER — ONDANSETRON 2 MG/ML
INJECTION INTRAMUSCULAR; INTRAVENOUS AS NEEDED
Status: DISCONTINUED | OUTPATIENT
Start: 2017-05-17 | End: 2017-05-17 | Stop reason: HOSPADM

## 2017-05-17 RX ORDER — TRANEXAMIC ACID 650 1/1
650 TABLET ORAL 2 TIMES DAILY
Status: DISCONTINUED | OUTPATIENT
Start: 2017-05-18 | End: 2017-05-19 | Stop reason: HOSPADM

## 2017-05-17 RX ORDER — MIDAZOLAM HYDROCHLORIDE 1 MG/ML
2 INJECTION, SOLUTION INTRAMUSCULAR; INTRAVENOUS ONCE
Status: COMPLETED | OUTPATIENT
Start: 2017-05-17 | End: 2017-05-17

## 2017-05-17 RX ORDER — FLUOXETINE HYDROCHLORIDE 20 MG/1
20 CAPSULE ORAL DAILY
Status: DISCONTINUED | OUTPATIENT
Start: 2017-05-18 | End: 2017-05-19 | Stop reason: HOSPADM

## 2017-05-17 RX ORDER — ALBUTEROL SULFATE 0.83 MG/ML
2.5 SOLUTION RESPIRATORY (INHALATION) AS NEEDED
Status: DISCONTINUED | OUTPATIENT
Start: 2017-05-17 | End: 2017-05-17 | Stop reason: HOSPADM

## 2017-05-17 RX ORDER — INSULIN LISPRO 100 [IU]/ML
INJECTION, SOLUTION INTRAVENOUS; SUBCUTANEOUS
Status: DISCONTINUED | OUTPATIENT
Start: 2017-05-17 | End: 2017-05-19 | Stop reason: HOSPADM

## 2017-05-17 RX ORDER — AMOXICILLIN 250 MG
2 CAPSULE ORAL DAILY
Status: DISCONTINUED | OUTPATIENT
Start: 2017-05-18 | End: 2017-05-19 | Stop reason: HOSPADM

## 2017-05-17 RX ORDER — FENOFIBRATE 160 MG/1
160 TABLET ORAL DAILY
Status: DISCONTINUED | OUTPATIENT
Start: 2017-05-18 | End: 2017-05-19 | Stop reason: HOSPADM

## 2017-05-17 RX ORDER — EZETIMIBE 10 MG/1
10 TABLET ORAL DAILY
Status: DISCONTINUED | OUTPATIENT
Start: 2017-05-18 | End: 2017-05-19 | Stop reason: HOSPADM

## 2017-05-17 RX ORDER — SODIUM CHLORIDE 0.9 % (FLUSH) 0.9 %
5-10 SYRINGE (ML) INJECTION AS NEEDED
Status: DISCONTINUED | OUTPATIENT
Start: 2017-05-17 | End: 2017-05-17 | Stop reason: HOSPADM

## 2017-05-17 RX ORDER — ACETAMINOPHEN 500 MG
1000 TABLET ORAL EVERY 6 HOURS
Status: DISCONTINUED | OUTPATIENT
Start: 2017-05-18 | End: 2017-05-19 | Stop reason: HOSPADM

## 2017-05-17 RX ORDER — OXYCODONE HYDROCHLORIDE 5 MG/1
5 TABLET ORAL
Status: DISCONTINUED | OUTPATIENT
Start: 2017-05-17 | End: 2017-05-17 | Stop reason: HOSPADM

## 2017-05-17 RX ORDER — HYDROMORPHONE HYDROCHLORIDE 2 MG/1
2 TABLET ORAL
Status: DISCONTINUED | OUTPATIENT
Start: 2017-05-17 | End: 2017-05-18

## 2017-05-17 RX ORDER — PROPOFOL 10 MG/ML
INJECTION, EMULSION INTRAVENOUS
Status: DISCONTINUED | OUTPATIENT
Start: 2017-05-17 | End: 2017-05-17 | Stop reason: HOSPADM

## 2017-05-17 RX ORDER — CELECOXIB 200 MG/1
200 CAPSULE ORAL
Status: DISCONTINUED | OUTPATIENT
Start: 2017-05-17 | End: 2017-05-17 | Stop reason: HOSPADM

## 2017-05-17 RX ORDER — CYCLOBENZAPRINE HCL 10 MG
5 TABLET ORAL 3 TIMES DAILY
Status: DISCONTINUED | OUTPATIENT
Start: 2017-05-17 | End: 2017-05-19 | Stop reason: HOSPADM

## 2017-05-17 RX ORDER — OXYCODONE HCL 10 MG/1
10 TABLET, FILM COATED, EXTENDED RELEASE ORAL EVERY 12 HOURS
Status: DISCONTINUED | OUTPATIENT
Start: 2017-05-17 | End: 2017-05-19 | Stop reason: HOSPADM

## 2017-05-17 RX ORDER — LIDOCAINE HYDROCHLORIDE 20 MG/ML
INJECTION, SOLUTION EPIDURAL; INFILTRATION; INTRACAUDAL; PERINEURAL AS NEEDED
Status: DISCONTINUED | OUTPATIENT
Start: 2017-05-17 | End: 2017-05-17 | Stop reason: HOSPADM

## 2017-05-17 RX ORDER — SODIUM CHLORIDE 0.9 % (FLUSH) 0.9 %
5-10 SYRINGE (ML) INJECTION EVERY 8 HOURS
Status: DISCONTINUED | OUTPATIENT
Start: 2017-05-17 | End: 2017-05-17 | Stop reason: HOSPADM

## 2017-05-17 RX ORDER — NALOXONE HYDROCHLORIDE 0.4 MG/ML
.2-.4 INJECTION, SOLUTION INTRAMUSCULAR; INTRAVENOUS; SUBCUTANEOUS
Status: DISCONTINUED | OUTPATIENT
Start: 2017-05-17 | End: 2017-05-19 | Stop reason: HOSPADM

## 2017-05-17 RX ORDER — LISINOPRIL AND HYDROCHLOROTHIAZIDE 10; 12.5 MG/1; MG/1
1 TABLET ORAL DAILY
Status: DISCONTINUED | OUTPATIENT
Start: 2017-05-18 | End: 2017-05-19 | Stop reason: HOSPADM

## 2017-05-17 RX ORDER — DEXAMETHASONE SODIUM PHOSPHATE 4 MG/ML
INJECTION, SOLUTION INTRA-ARTICULAR; INTRALESIONAL; INTRAMUSCULAR; INTRAVENOUS; SOFT TISSUE AS NEEDED
Status: DISCONTINUED | OUTPATIENT
Start: 2017-05-17 | End: 2017-05-17 | Stop reason: HOSPADM

## 2017-05-17 RX ORDER — HYDROMORPHONE HYDROCHLORIDE 1 MG/ML
1 INJECTION, SOLUTION INTRAMUSCULAR; INTRAVENOUS; SUBCUTANEOUS
Status: DISCONTINUED | OUTPATIENT
Start: 2017-05-17 | End: 2017-05-19 | Stop reason: HOSPADM

## 2017-05-17 RX ORDER — OLOPATADINE HYDROCHLORIDE 665 UG/1
2 SPRAY NASAL 2 TIMES DAILY
Status: DISCONTINUED | OUTPATIENT
Start: 2017-05-17 | End: 2017-05-19 | Stop reason: HOSPADM

## 2017-05-17 RX ORDER — GABAPENTIN 100 MG/1
100 CAPSULE ORAL 2 TIMES DAILY
Status: DISCONTINUED | OUTPATIENT
Start: 2017-05-17 | End: 2017-05-19 | Stop reason: HOSPADM

## 2017-05-17 RX ORDER — SULINDAC 150 MG/1
150 TABLET ORAL 2 TIMES DAILY
Status: DISCONTINUED | OUTPATIENT
Start: 2017-05-17 | End: 2017-05-19 | Stop reason: HOSPADM

## 2017-05-17 RX ORDER — HYDROMORPHONE HYDROCHLORIDE 2 MG/ML
0.5 INJECTION, SOLUTION INTRAMUSCULAR; INTRAVENOUS; SUBCUTANEOUS
Status: DISCONTINUED | OUTPATIENT
Start: 2017-05-17 | End: 2017-05-17 | Stop reason: HOSPADM

## 2017-05-17 RX ORDER — NEOMYCIN AND POLYMYXIN B SULFATES 40; 200000 MG/ML; [USP'U]/ML
SOLUTION IRRIGATION AS NEEDED
Status: DISCONTINUED | OUTPATIENT
Start: 2017-05-17 | End: 2017-05-17 | Stop reason: HOSPADM

## 2017-05-17 RX ORDER — ZOLPIDEM TARTRATE 5 MG/1
5 TABLET ORAL
Status: DISCONTINUED | OUTPATIENT
Start: 2017-05-17 | End: 2017-05-19 | Stop reason: HOSPADM

## 2017-05-17 RX ORDER — CEFAZOLIN SODIUM IN 0.9 % NACL 2 G/50 ML
2 INTRAVENOUS SOLUTION, PIGGYBACK (ML) INTRAVENOUS ONCE
Status: DISCONTINUED | OUTPATIENT
Start: 2017-05-17 | End: 2017-05-17 | Stop reason: HOSPADM

## 2017-05-17 RX ORDER — MIDAZOLAM HYDROCHLORIDE 1 MG/ML
INJECTION, SOLUTION INTRAMUSCULAR; INTRAVENOUS AS NEEDED
Status: DISCONTINUED | OUTPATIENT
Start: 2017-05-17 | End: 2017-05-17 | Stop reason: HOSPADM

## 2017-05-17 RX ORDER — FENTANYL CITRATE 50 UG/ML
100 INJECTION, SOLUTION INTRAMUSCULAR; INTRAVENOUS ONCE
Status: COMPLETED | OUTPATIENT
Start: 2017-05-17 | End: 2017-05-17

## 2017-05-17 RX ORDER — ACETAMINOPHEN 10 MG/ML
1000 INJECTION, SOLUTION INTRAVENOUS ONCE
Status: COMPLETED | OUTPATIENT
Start: 2017-05-17 | End: 2017-05-17

## 2017-05-17 RX ORDER — ATORVASTATIN CALCIUM 40 MG/1
40 TABLET, FILM COATED ORAL
Status: DISCONTINUED | OUTPATIENT
Start: 2017-05-17 | End: 2017-05-19 | Stop reason: HOSPADM

## 2017-05-17 RX ORDER — METOPROLOL TARTRATE 25 MG/1
25 TABLET, FILM COATED ORAL 2 TIMES DAILY
Status: DISCONTINUED | OUTPATIENT
Start: 2017-05-17 | End: 2017-05-19 | Stop reason: HOSPADM

## 2017-05-17 RX ORDER — GUAIFENESIN 100 MG/5ML
81 LIQUID (ML) ORAL 2 TIMES DAILY
Status: DISCONTINUED | OUTPATIENT
Start: 2017-05-17 | End: 2017-05-19 | Stop reason: HOSPADM

## 2017-05-17 RX ORDER — TRANEXAMIC ACID 100 MG/ML
INJECTION, SOLUTION INTRAVENOUS AS NEEDED
Status: DISCONTINUED | OUTPATIENT
Start: 2017-05-17 | End: 2017-05-17 | Stop reason: HOSPADM

## 2017-05-17 RX ORDER — BUPIVACAINE HYDROCHLORIDE 7.5 MG/ML
INJECTION, SOLUTION INTRASPINAL AS NEEDED
Status: DISCONTINUED | OUTPATIENT
Start: 2017-05-17 | End: 2017-05-17 | Stop reason: HOSPADM

## 2017-05-17 RX ORDER — MIDAZOLAM HYDROCHLORIDE 1 MG/ML
2 INJECTION, SOLUTION INTRAMUSCULAR; INTRAVENOUS
Status: DISCONTINUED | OUTPATIENT
Start: 2017-05-17 | End: 2017-05-17 | Stop reason: HOSPADM

## 2017-05-17 RX ADMIN — OXYCODONE HYDROCHLORIDE 10 MG: 10 TABLET, FILM COATED, EXTENDED RELEASE ORAL at 20:21

## 2017-05-17 RX ADMIN — CEFAZOLIN 2 G: 1 INJECTION, POWDER, FOR SOLUTION INTRAMUSCULAR; INTRAVENOUS; PARENTERAL at 20:22

## 2017-05-17 RX ADMIN — HYDROMORPHONE HYDROCHLORIDE 1 MG: 1 INJECTION, SOLUTION INTRAMUSCULAR; INTRAVENOUS; SUBCUTANEOUS at 16:37

## 2017-05-17 RX ADMIN — ASPIRIN 81 MG CHEWABLE TABLET 81 MG: 81 TABLET CHEWABLE at 18:01

## 2017-05-17 RX ADMIN — CEFAZOLIN 2 G: 1 INJECTION, POWDER, FOR SOLUTION INTRAMUSCULAR; INTRAVENOUS; PARENTERAL at 11:52

## 2017-05-17 RX ADMIN — MIDAZOLAM HYDROCHLORIDE 0.5 MG: 1 INJECTION, SOLUTION INTRAMUSCULAR; INTRAVENOUS at 12:49

## 2017-05-17 RX ADMIN — GABAPENTIN 100 MG: 100 CAPSULE ORAL at 18:01

## 2017-05-17 RX ADMIN — PROPOFOL 25 MCG/KG/MIN: 10 INJECTION, EMULSION INTRAVENOUS at 12:05

## 2017-05-17 RX ADMIN — ONDANSETRON 4 MG: 2 INJECTION INTRAMUSCULAR; INTRAVENOUS at 12:12

## 2017-05-17 RX ADMIN — SODIUM CHLORIDE 100 ML/HR: 900 INJECTION, SOLUTION INTRAVENOUS at 16:38

## 2017-05-17 RX ADMIN — TUBERCULIN PURIFIED PROTEIN DERIVATIVE 5 UNITS: 5 INJECTION, SOLUTION INTRADERMAL at 09:22

## 2017-05-17 RX ADMIN — FENTANYL CITRATE 50 MCG: 50 INJECTION, SOLUTION INTRAMUSCULAR; INTRAVENOUS at 11:11

## 2017-05-17 RX ADMIN — BUPIVACAINE HYDROCHLORIDE 2 ML: 7.5 INJECTION, SOLUTION INTRASPINAL at 12:04

## 2017-05-17 RX ADMIN — CYCLOBENZAPRINE HYDROCHLORIDE 5 MG: 10 TABLET, FILM COATED ORAL at 16:37

## 2017-05-17 RX ADMIN — HYDROMORPHONE HYDROCHLORIDE 2 MG: 2 TABLET ORAL at 20:21

## 2017-05-17 RX ADMIN — SODIUM CHLORIDE, SODIUM LACTATE, POTASSIUM CHLORIDE, AND CALCIUM CHLORIDE 75 ML/HR: 600; 310; 30; 20 INJECTION, SOLUTION INTRAVENOUS at 09:22

## 2017-05-17 RX ADMIN — SODIUM CHLORIDE, SODIUM LACTATE, POTASSIUM CHLORIDE, AND CALCIUM CHLORIDE: 600; 310; 30; 20 INJECTION, SOLUTION INTRAVENOUS at 12:08

## 2017-05-17 RX ADMIN — MIDAZOLAM HYDROCHLORIDE 1 MG: 1 INJECTION, SOLUTION INTRAMUSCULAR; INTRAVENOUS at 12:30

## 2017-05-17 RX ADMIN — ACETAMINOPHEN 1000 MG: 10 INJECTION, SOLUTION INTRAVENOUS at 17:58

## 2017-05-17 RX ADMIN — ATORVASTATIN CALCIUM 40 MG: 40 TABLET, FILM COATED ORAL at 23:07

## 2017-05-17 RX ADMIN — LIDOCAINE HYDROCHLORIDE 0.1 ML: 10 INJECTION, SOLUTION INFILTRATION; PERINEURAL at 09:22

## 2017-05-17 RX ADMIN — DEXAMETHASONE SODIUM PHOSPHATE 8 MG: 4 INJECTION, SOLUTION INTRA-ARTICULAR; INTRALESIONAL; INTRAMUSCULAR; INTRAVENOUS; SOFT TISSUE at 12:12

## 2017-05-17 RX ADMIN — SODIUM CHLORIDE, SODIUM LACTATE, POTASSIUM CHLORIDE, AND CALCIUM CHLORIDE: 600; 310; 30; 20 INJECTION, SOLUTION INTRAVENOUS at 11:52

## 2017-05-17 RX ADMIN — HYDROMORPHONE HYDROCHLORIDE 1 MG: 1 INJECTION, SOLUTION INTRAMUSCULAR; INTRAVENOUS; SUBCUTANEOUS at 18:01

## 2017-05-17 RX ADMIN — MIDAZOLAM HYDROCHLORIDE 1 MG: 1 INJECTION, SOLUTION INTRAMUSCULAR; INTRAVENOUS at 13:05

## 2017-05-17 RX ADMIN — HYDROMORPHONE HYDROCHLORIDE 1 MG: 1 INJECTION, SOLUTION INTRAMUSCULAR; INTRAVENOUS; SUBCUTANEOUS at 22:00

## 2017-05-17 RX ADMIN — LIDOCAINE HYDROCHLORIDE 50 MG: 20 INJECTION, SOLUTION EPIDURAL; INFILTRATION; INTRACAUDAL; PERINEURAL at 12:05

## 2017-05-17 RX ADMIN — CYCLOBENZAPRINE HYDROCHLORIDE 5 MG: 10 TABLET, FILM COATED ORAL at 23:07

## 2017-05-17 RX ADMIN — MIDAZOLAM HYDROCHLORIDE 1 MG: 1 INJECTION, SOLUTION INTRAMUSCULAR; INTRAVENOUS at 12:04

## 2017-05-17 RX ADMIN — TRANEXAMIC ACID 1000 MG: 100 INJECTION, SOLUTION INTRAVENOUS at 12:05

## 2017-05-17 RX ADMIN — MIDAZOLAM HYDROCHLORIDE 2 MG: 1 INJECTION, SOLUTION INTRAMUSCULAR; INTRAVENOUS at 11:11

## 2017-05-17 RX ADMIN — MIDAZOLAM HYDROCHLORIDE 0.5 MG: 1 INJECTION, SOLUTION INTRAMUSCULAR; INTRAVENOUS at 12:59

## 2017-05-17 NOTE — H&P
The patient presents today for left total knee arthroplasty. The patient has end stage arthritis of the left knee. The patient was seen previously in the office and there has been no changes to the patient's conditions. The patient has joint line pain that occurs with walking and bending the knee. The patient reports stiffness in the knee after prolonged inactivity and swelling along with increased pain at the end of the day and after increased activity. The patient is affected by the knee daily which affects their activities of daily living and quality of life. The patient has attempted conservative treatment for this condition including lifestyle modifications, anti-inflammatories, and injections. The risks, benefits, and alternatives have previously been discussed with the patient including but not limited to infection, bleeding, damage to nerves and/or blood vessels, stiffness of the knee after surgery, patellar maltracking, fracture both intra-op and post-op, polyethylene wear, implant loosening, risk for revision surgery should any of the above occur, venous thrombo-embolism including deep vein thrombosis and pulmonary embolism, and potential for continued pain after surgery. We have also previously discussed the potential of morbidity and mortality associated with, but not limited to, the actual surgical procedure, anesthesia, prior medical conditions, and/or the administration of medications perioperatively. The patient has been given the opportunity to ask questions all of which have been answered and the patient wishes to proceed. The necessity for left total knee is still present, and the H&P is still current.

## 2017-05-17 NOTE — ANESTHESIA PREPROCEDURE EVALUATION
Anesthetic History   No history of anesthetic complications            Review of Systems / Medical History  Patient summary reviewed, nursing notes reviewed and pertinent labs reviewed    Pulmonary  Within defined limits                 Neuro/Psych         Psychiatric history     Cardiovascular    Hypertension          CAD and hyperlipidemia    Exercise tolerance: >4 METS     GI/Hepatic/Renal         Renal disease: CRI       Endo/Other        Obesity and arthritis     Other Findings            Physical Exam    Airway  Mallampati: II      Mouth opening: Normal     Cardiovascular  Regular rate and rhythm,  S1 and S2 normal,  no murmur, click, rub, or gallop             Dental  No notable dental hx       Pulmonary  Breath sounds clear to auscultation               Abdominal         Other Findings            Anesthetic Plan    ASA: 3  Anesthesia type: spinal - femoral single shot      Post-op pain plan if not by surgeon: peripheral nerve block single      Anesthetic plan and risks discussed with: Patient

## 2017-05-17 NOTE — PROGRESS NOTES
Problem: Mobility Impaired (Adult and Pediatric)  Goal: *Acute Goals and Plan of Care (Insert Text)  GOALS (1-4 days):  (1.)Mr. Sanjay Pradhan will move from supine to sit and sit to supine in bed with SUPERVISION. (2.)Mr. Sanjay Pradhan will transfer from bed to chair and chair to bed with STAND BY ASSIST using the least restrictive device. (3.)Mr. Sanjay Pradhan will ambulate with STAND BY ASSIST for 200 feet with the least restrictive device. (4.)Mr. Sanjay Pradhan will ambulate up/down 3 steps with left railing with CONTACT GUARD ASSIST with no device. (5.)Mr. Sanjay Pradhan will increase left knee ROM to 5°-80°.  ________________________________________________________________________________________________      PHYSICAL THERAPY JOINT CAMP TKA: INITIAL ASSESSMENT, TREATMENT DAY: DAY OF ASSESSMENT, PM 5/17/2017  INPATIENT: Hospital Day: 1  Payor: Jorge Helm / Plan: [employer] / Product Type: HMO /      NAME/AGE/GENDER: Jessica Ryan is a 73 y.o. male             PRIMARY DIAGNOSIS:  Primary osteoarthritis of left knee [M17.12]              Procedure(s) and Anesthesia Type:     * LEFT KNEE ARTHROPLASTY TOTAL - Spinal (Left)  ICD-10: Treatment Diagnosis:        · Pain in Left Knee (M25.562)  · Stiffness of Left Knee, Not elsewhere classified (M25.662)  · Difficulty in walking, Not elsewhere classified (R26.2)       ASSESSMENT:      Mr. Sanjay Pradhan presents with impaired strength & mobility s/p left TKA. Pt also had decreased stability during out of bed activity. Pt will benefit from follow up therapy to help restore safe function prior to returning home with caregiver       This section established at most recent assessment   PROBLEM LIST (Impairments causing functional limitations):  1. Decreased Strength  2. Decreased ADL/Functional Activities  3. Decreased Transfer Abilities  4. Decreased Ambulation Ability/Technique  5. Decreased Balance  6. Increased Pain  7. Decreased Activity Tolerance  8.  Decreased Flexibility/Joint Mobility  9. Decreased [place] with Home Exercise Program    INTERVENTIONS PLANNED: (Benefits and precautions of physical therapy have been discussed with the patient.)  1. Bed Mobility  2. Gait Training  3. Home Exercise Program (HEP)  4. Therapeutic Exercise/Strengthening  5. Transfer Training  6. Range of Motion: active/assisted/passive  7. Therapeutic Activities  8. Group Therapy      TREATMENT PLAN: Frequency/Duration: Follow patient BID   to address above goals. Rehabilitation Potential For Stated Goals: GOOD      RECOMMENDED REHABILITATION/EQUIPMENT: (at time of discharge pending progress): Continue Skilled Therapy and Home Health: Physical Therapy. HISTORY:   History of Present Injury/Illness (Reason for Referral):  HPI:  The patient has end stage arthritis of the left knee. The patient was evaluated and examined during a consultation prior to today. The patient complains of knee pain with activities, reports pain as mostly occurring along the joint lines, reports stiffness of the knee with prolonged inactivity, and swelling/pain at the end of the day and after increased physical activity. The pain affects the patients activities of daily living and quality of life. The patient has attempted and exhausted conservative treatment. There have been no changes to the patient's orthopedic condition since the last office visit. Past Medical History/Comorbidities:   Mr. Lei Martinez  has a past medical history of Benign prostatic hyperplasia; CAD (coronary artery disease); Depression; Diastolic dysfunction; Elevated serum creatinine (04/24/2017); Former smoker; H/O echocardiogram (07/24/2014); Hypercholesteremia; Hypertension; Osteoarthritis; and Overactive bladder. He also has no past medical history of Adverse effect of anesthesia; Difficult intubation; Malignant hyperthermia due to anesthesia; Nausea & vomiting; or Pseudocholinesterase deficiency.   Mr. Lei Martinez  has a past surgical history that includes urological (2013) and colonoscopy. Social History/Living Environment:   Home Environment: Private residence  # Steps to Enter: 3  Rails to Enter: Yes  Hand Rails : Right  One/Two Story Residence: One story  Living Alone: No  Support Systems: Spouse/Significant Other/Partner  Patient Expects to be Discharged to[de-identified] Private residence  Current DME Used/Available at Home: Com[employer], bedside  Tub or Shower Type: Shower  Prior Level of Function/Work/Activity:  Pt was independent without an assistive device prior to this admission   Number of Personal Factors/Comorbidities that affect the Plan of Care: 0: LOW COMPLEXITY   EXAMINATION:   Most Recent Physical Functioning:   Gross Assessment: Yes  Gross Assessment  AROM: Within functional limits (right LE)  Strength: Within functional limits (right LE)            LLE PROM  L Knee Flexion: 70 (~post op)  L Knee Extension: -15 (~post op)           Bed Mobility  Supine to Sit: Contact guard assistance  Sit to Supine:  (NT)  Scooting: Contact guard assistance     Transfers  Sit to Stand: Minimum assistance  Stand to Sit: Minimum assistance  Bed to Chair: Minimum assistance (with walker)     Balance  Sitting: Intact; Without support  Standing: Impaired; With support (walker)                Weight Bearing Status  Left Side Weight Bearing: As tolerated  Distance (ft): 30 Feet (ft)  Ambulation - Level of Assistance: Minimal assistance  Assistive Device: Walker, rolling  Speed/Aide: Shuffled; Slow  Step Length: Left shortened;Right shortened  Stance: Left decreased  Gait Abnormalities: Antalgic;Decreased step clearance         Braces/Orthotics: none     Left Knee Cold  Type: Cryocuff       Body Structures Involved:  1. Joints  2. Muscles Body Functions Affected:  1. Movement Related Activities and Participation Affected:  1.  Mobility   Number of elements that affect the Plan of Care: 4+: HIGH COMPLEXITY   CLINICAL PRESENTATION:   Presentation: Stable and uncomplicated: LOW COMPLEXITY   CLINICAL DECISION MAKIN[address] [place] [id number] AM-PAC 6 Clicks   Basic Mobility Inpatient Short Form  How much difficulty does the patient currently have. .. Unable A Lot A Little None   1. Turning over in bed (including adjusting bedclothes, sheets and blankets)? [ ] 1   [ ] 2   [X] 3   [ ] 4   2. Sitting down on and standing up from a chair with arms ( e.g., wheelchair, bedside commode, etc.)   [ ] 1   [ ] 2   [X] 3   [ ] 4   3. Moving from lying on back to sitting on the side of the bed? [ ] 1   [ ] 2   [X] 3   [ ] 4   How much help from another person does the patient currently need. .. Total A Lot A Little None   4. Moving to and from a bed to a chair (including a wheelchair)? [ ] 1   [ ] 2   [X] 3   [ ] 4   5. Need to walk in hospital room? [ ] 1   [ ] 2   [X] 3   [ ] 4   6. Climbing 3-5 steps with a railing? [X] 1   [ ] 2   [ ] 3   [ ] 4   © 2007, Trustees of [address] [place] [address] [address] [address], under license to [employer] All rights reserved       Score:  Initial: 16 Most Recent: X (Date: -- )     Interpretation of Tool:  Represents activities that are increasingly more difficult (i.e. Bed mobility, Transfers, Gait). Score 24 23 22-20 19-15 14-10 9-7 6       Modifier CH CI CJ CK CL CM CN         · Mobility - Walking and Moving Around:               - CURRENT STATUS:    CK - 40%-59% impaired, limited or restricted               - GOAL STATUS:           CJ - 20%-39% impaired, limited or restricted               - D/C STATUS:                       ---------------To be determined---------------  Payor: Corine Escalera / Plan:  [address] / Product Type: HMO /       Medical Necessity:     · Patient is expected to demonstrate progress in strength, range of motion, balance, coordination and functional technique to decrease assistance required with bed mobility, transfers & gait.   Reason for Services/Other Comments:  · Patient continues to require skilled intervention due to pt not independent with functional mobility. Use of outcome tool(s) and clinical judgement create a POC that gives a: Clear prediction of patient's progress: LOW COMPLEXITY                 TREATMENT:   (In addition to Assessment/Re-Assessment sessions the following treatments were rendered)      Pre-treatment Symptoms/Complaints:  none  Pain: Initial: visual scale  Pain Intensity 1: 0  Post Session:  0/10      Assessment/Reassessment only, no treatment provided today        Date:    Date:    Date:      ACTIVITY/EXERCISE AM PM AM PM AM PM   GROUP THERAPY  [ ]  [ ]  [ ]  [ ]  [ ]  [ ]   Ankle Pumps               Quad Sets               Gluteal Sets               Hip ABd/ADduction               Straight Leg Raises               Knee Slides               Short Arc Quads               Long Arc Quads               Chair Slides                               B = bilateral; AA = active assistive; A = active; P = passive       Treatment/Session Assessment:         Response to Treatment:  Tolerated very well     Education:  [ ] Home Exercises  [X] Fall Precautions  [ ] Hip Precautions [ ] Going Home Video  [ ] Knee/Hip Prosthesis Review  [X] Walker Management/Safety [ ] Adaptive Equipment as Needed         Interdisciplinary Collaboration:   · Occupational Therapist  · Registered Nurse     After treatment position/precautions:   · Up in chair  · Bed alarm/tab alert on  · Bed/Chair-wheels locked  · Caregiver at bedside  · Call light within reach  · RN notified  · Family at bedside     Compliance with Program/Exercises: Will assess as treatment progresses. Recommendations/Intent for next treatment session:  Treatment next visit will focus on increasing Mr. Stevensons independence with bed mobility, transfers, gait training, strength/ROM exercises, modalities for pain, and patient education.        Total Treatment Duration:  PT Patient Time In/Time Out  Time In: 1515  Time Out: Esequiel [address], PT

## 2017-05-17 NOTE — PERIOP NOTES
BETADINE LAVAGE: 17.5cc of betadine lot # [id number] , exp 11/18  In 500cc 0.9NS lot # [id number] , exp [id number]

## 2017-05-17 NOTE — OP NOTES
[address] [place] [address]  Total Knee Arthroplasty  Patient:Loi Lemons   : 1950  Medical Record [id number]      Pre-operative Diagnosis:  Primary osteoarthritis of left knee [M17.12]  Post-operative Diagnosis: Primary osteoarthritis of left knee [M17.12]  Location: [place] [address]    Date of Procedure: 2017  Surgeon: Chani Weeks MD  Assistant: None  Anesthesia: Spinal and adductor nerve block    Procedure:  Left Total Knee Arthroplasty     Tourniquet Time: 0 minutes    EBL: less than 148HO    Complications: None    Patient Condition upon Completion of Procedure: Stable    Implants:   Implant Name Type Inv. Item Serial No.  Lot No. LRB No. Used   COMPNT FEM CR TRIATHLN 6 L PA --  - [id number]  COMPNT FEM CR TRIATHLN 6 L PA --  [id number] [employer] HOW [id number] Left 1   BASEPLT TIB PC TRITNM SZ 7 -- TRIATHLON - [id number]  BASEPLT TIB PC TRITNM SZ 7 -- TRIATHLON [id number] [employer] HOW [id number] Left 1   PAT ASYM MTL-BK 11MM SZ A38 -- TRIATHLON - [id number]  PAT ASYM MTL-BK 11MM SZ A38 -- TRIATHLON AAT5 [employer] HOW AAT5 Left 1   INSERT TIB CR TRIATHLN 7 13MM --  - [id number]   INSERT TIB CR TRIATHLN 7 13MM --  [id number] [employer] HOW [id number] Left 1       Intra-Operative Findings: Pre-operatively we assesd the motion of the patients knee and noted that the knee lacked approximately 0 degrees of extension. Intra-operatively we noted that the articular surfaces were arthritis with cartilage loss of both the medial and lateral compartments. The patella was examined and found to be in poor condition. The patella was resurfaced. With trial components in place we assessed knee motion and found that the knee was able to fully extend. Flexion was felt to be 135 degrees. Description of Procedure:    Gilma Rutledge was brought to the operating room. A adductor canal block had been done in the preop holding area.  Antibiotics were given per proticol preoperatively. After proper identification was performed  spinal anesthesia was administered. A rayo catheter was placed and one gram tranexemic acid was given. The patient was positioned supine on the operating room table. The left leg and was then prepped and draped in the usual sterile manner. Prior to the incision being made a timeout was called identifying the patient, procedure, operative side and surgeon. An anterior longitudinal incision was made just medial to the tibial tubercle and extending proximal.  A medial parapatellar capsular incision was performed. The medial capsular flap was elevated around to the midcoronal plane. The patella was subluxed laterally and patellar fat pat partially excised. The knee was flexed and externally rotated. The articular surface revealed cartilage loss with exposed bone and bone spurs throughout all three compartments. The anterior cruciate ligament was resected. We first addressed our distal femoral resection. Using intramedullary instrumentation we resected 8mm of distal femur using a 5 degrees valgus cut angle. Medial and lateral condylar cuts were verified to be level using the capture of the distal femoral cutting jig. We next addressed our proximal tibia. Using extramedullary intrstrumentation we resected 2mm of bone as measured from the more involved compartment. Our cut was verified to be perpendicular to the meachanical axis of the tibia as referenced from the tibial tubercle, the long axis of the tibia, the center of the ankle, and the patients 2nd toe. Our slope was cut with the goal of 0-3 degrees posterior slope. At this point a spacer block was used to verify that the knee was able to obtain full extension and was balanced in extension.  We did not have to resect additional bone to achieve this goal. Once the knee was felt to fully extend and showed good balance the distal femoral pins were removed and we moved on to finishing our distal femoral preparation. Prior to sizing our distal femur we marked Channing's Line and our transepicondylar axis. Using sizing instrumentation the femur was sized to a #6 component. The anterior and posterior cuts along with the anterior and posterior chamfer cuts were then made using the 4-in-1 cutting block. Osteophytes were removed from the tibial and femoral surfaces. We assessed the PCL and felt it to be stable and in good condition. Medial and lateral meniscus were removed along any redundant tissue. Any posterior osteophytes were removed carefully. The tibia was then sized to a #7 component. The tibial base plate was pinned into place with what we felt to be appropriate rotation with the center of the tibial tray bisecting the function between the medial and middle 1/3rd of the tibial tubercle. We then performed a trial of the knee. We felt that with a 13 mm polyethylene trial in place the knee had acceptable varus and valgus stability throughout arc of motion, was able to fully extend, was not too tight in flexion, and had acceptable stability with anterior drawer testing. No additional surgical releases were required. Again our PCL was assessed and felt to be in good condition and was felt to be very stable. The patella was then everted and examined. The patella was felt to be in poor condition and the decision was made to  resurface the patella. Bone was resected to accomodate a size 38mm patella button. A trial reduction revealed appropriate tracking through the patellofemoral groove with no lateral retinacular release needed. The trial polyethylene component and femoral component were removed. Again we assessed the position and rotation of the tibial tray. We did not have to adjust its position. Once we verified that its position was acceptable the proximal tibia was punched and drilled per protocol for a cementless tibia.     All trial components were removed and cut surfaces were irrigated and debrided of loose bone and/or soft tissue. A #7 [place] Triathlon cementless tritanium-backed tibial component was impacted into place with good seating noted at time of final impaction. We then impacted a #6 [place] Triathlon cementless hydroxyapatite-backed femoral component into place with good seating noted of the implant at final impaction. We then inserted our final polyethylene component which was 13 mm thick. We then inserted a 38mm tritanium-backed cementless patellar component. The patellar component was noted to be fully seated after compression. We did assess the patellar components fixation and ability to be elevated using a tonsil and felt the component to be very stable. Yasmeen Rousseau's knee was placed through a range of motion and noted to be stable as mentioned above with the trial components. We again checked patellar tracking and felt the patellar component tracked well within the trochlear groove. A lavage of diluted betadine solution of 17.5 ml Betadine in 500 of 0.9% normal saline was allowed to soak in the wound for 3 minutes after implanting of the prosthesis. The wound was irrigated with saline again before closure. We then carefully injected periarticular cocktail about and capsule and subcutaneous tissue. In addition, one additional gram of IV transexemic acid was given at completion of the case for a total of two grams for the procedure. No drain was placed. The capsular layer was closed using a combination of 0-Stratafix bidirectional barbed suture and #2 Fiberwire, while the subcutaneous layers were closed using a 2-0 Vicryl interrupted suture. The skin was closed using  3-0 Stratafix barbed monocril suture and the Prineo skin closure system. A sterile dressing was applied. A cryo pad was applied on the operative leg. The sponge count and needle counts were correct. Patient was transferred to the hospital stretcher and transported to recovery in stable condition.       Signed By: Lawrence Roblero MD

## 2017-05-17 NOTE — PROGRESS NOTES
Care Management Interventions  Mode of Transport at Discharge: Self  Transition of Care Consult (CM Consult): [address]: Yes  Discharge Durable Medical Equipment: No  Physical Therapy Consult: Yes  Occupational Therapy Consult: Yes  Current Support Network: Lives with Spouse  Confirm Follow Up Transport: Family  Plan discussed with Pt/Family/Caregiver: Yes  Freedom of Choice Offered: Yes  Discharge Location  Discharge Placement: Home with home health    Patient is a 73y.o. year old male admitted for Left TKA . Patient lives with His spouse and plans to return home on discharge. Order received to arrange home health. Patient without preference towards agency. Referral sent to [facility] [address] Patient denies any equipment needs as he has a walker and bedside commode. Will follow until discharge.   Trudy Lindsay

## 2017-05-17 NOTE — ANESTHESIA POSTPROCEDURE EVALUATION
Post-Anesthesia Evaluation and Assessment    Patient: James Bustamante MRN: [id number]  SSN: [id number]    YOB: 1950  Age: 73 y.o. Sex: male       Cardiovascular Function/Vital Signs  Visit Vitals    /55    Pulse (!) 52    Temp 36.2 °C (97.1 °F)    Resp 16    Ht 6' (1.829 m)    Wt 108 kg (238 lb)    SpO2 99%    BMI 32.28 kg/m2       Patient is status post spinal anesthesia for Procedure(s):  LEFT KNEE ARTHROPLASTY TOTAL. Nausea/Vomiting: None    Postoperative hydration reviewed and adequate. Pain:  Pain Scale 1: Numeric (0 - 10) (05/17/17 1419)  Pain Intensity 1: 0 (05/17/17 1419)   Managed    Neurological Status:   Neuro (WDL): Exceptions to WDL (05/17/17 1359)  Neuro  Neurologic State: Alert (05/17/17 1359)  Orientation Level: Oriented X4 (05/17/17 1359)  LLE Motor Response: Numbness;Weak (05/17/17 1359)  RLE Motor Response: Numbness;Weak (05/17/17 1359)   Block resolving    Mental Status and Level of Consciousness: Alert and oriented     Pulmonary Status:   O2 Device: Nasal cannula (05/17/17 1419)   Adequate oxygenation and airway patent    Complications related to anesthesia: None    Post-anesthesia assessment completed.  No concerns    Signed By: Antonella Welsh MD     May 17, 2017

## 2017-05-17 NOTE — PROGRESS NOTES
[facility] - INPATIENT  Face to Face Encounter    Patients Name: New Kelsey    YOB: 1950    Ordering Physician:  Rosa Sena    Primary Diagnosis: Primary osteoarthritis of left knee [M17.12]  S/p left TKA    Date of Face to Face:   5/17/2017                                  Face to Face Encounter findings are related to primary reason for home care:   yes. 1. I certify that the patient needs intermittent care as follows: physical therapy: gait/stair training    2. I certify that this patient is homebound, that is: 1) patient requires the use of a walker device, special transportation, or assistance of another to leave the home; or 2) patient's condition makes leaving the home medically contraindicated; and 3) patient has a normal inability to leave the home and leaving the home requires considerable and taxing effort. Patient may leave the home for infrequent and short duration for medical reasons, and occasional absences for non-medical reasons. Homebound status is due to the following functional limitations: Patient's ambulation limited secondary to severe pain and requires the use of an assistive device and the assistance of a caregiver for safe completion. Patient with strength and ROM deficits limiting ambulation endurance requiring the use of an assistive device and the assistance of a caregiver. Patient deemed temporarily homebound secondary to increased risk for infection when leaving home and going out into the community. 3. I certify that this patient is under my care and that I, or a nurse practitioner or  [id number], or clinical nurse specialist, or certified nurse midwife, working with me, had a Face-to-Face Encounter that meets the physician Face-to-Face Encounter requirements.   The following are the clinical findings from the [address] [place] [address] encounter that support the need for skilled services and is a summary of the encounter: see hospital chart        Jose David Walsh, SERGIO  5/17/2017      THE FOLLOWING TO BE COMPLETED BY THE COMMUNITY PHYSICIAN:    I concur with the findings described above from the F2F encounter that this patient is homebound and in need of a skilled service.     Certifying Physician: _____________________________________      Printed Certifying Physician Name: _____________________________________    Date: _________________

## 2017-05-17 NOTE — PROGRESS NOTES
TRANSFER - IN REPORT:    Verbal report received from Chilango Almanza RN (name) on Jeffery Chatman  being received from PACU (unit) for routine progression of care      Report consisted of patients Situation, Background, Assessment and   Recommendations(SBAR). Information from the following report(s) SBAR, Kardex, Intake/Output, MAR and Recent Results was reviewed with the receiving nurse. Opportunity for questions and clarification was provided. Assessment will be completed upon patients arrival to unit and care assumed.

## 2017-05-17 NOTE — PERIOP NOTES
TRANSFER - IN REPORT:    Verbal report received from Bledsoe, RN (name) on Recao Hobby  being received from [facility] (unit) for routine progression of care      Report consisted of patients Situation, Background, Assessment and   Recommendations(SBAR). Information from the following report(s) SBAR, Kardex, Intake/Output, MAR, Recent Results and Med Rec Status was reviewed with the receiving nurse. Opportunity for questions and clarification was provided.

## 2017-05-17 NOTE — IP AVS SNAPSHOT
James Torres 
 
 
 [address] 
[phone] Patient: Aron Alva 
MRN: [id number] LVN:[id number] You are allergic to the following Allergen Reactions Neosporin (Hydrocortisone) Other (comments) \"it has a reverse effect\" Pcn (Penicillins) Other (comments) Light headed and starts to pass out Immunizations Administered for This Admission Name Date  
 TB Skin Test (PPD) Intradermal 5/17/2017 Recent Documentation Height Weight BMI Smoking Status 1.829 m 108 kg 32.28 kg/m2 Former Smoker Emergency Contacts Name Discharge Info Relation Home Work Mobile Elisabeth Rousseau  Spouse [3] [phone] About your hospitalization You were admitted on:  May 17, 2017 You last received care in the:  [facility] You were discharged on:  May 19, 2017 Unit phone number:  [phone] Why you were hospitalized Your primary diagnosis was:  Not on File Your diagnoses also included:  Oa (Osteoarthritis) Of Knee Providers Seen During Your Hospitalizations Provider Role Specialty Primary office phone Nehemias Pizarro MD Attending Provider Orthopedic Surgery [phone] Your Primary Care Physician (PCP) Primary Care Physician Office Phone Office Fax Sue Garcia [phone] Follow-up Information Follow up With Details Comments Contact Info Lillie Kruger MD  As needed [address] [facility] Adri Coronel [phone] Nehemias Pizarro MD  As scheduled by office [address] [place] [address] [facility] [address] 
[phone] [address]  Will contact you within 48 hrs [address] [place] [id number] 
[phone] Current Discharge Medication List  
  
START taking these medications Dose & Instructions Dispensing Information Comments Morning Noon Evening Bedtime  
 acetaminophen 500 mg tablet Commonly known as:  TYLENOL Your next dose is: Today Dose:  1000 mg Take 2 Tabs by mouth every six (6) hours. Quantity:  120 Tab Refills:  0  
     
   
   
  
   
  
 aspirin 81 mg chewable tablet Replaces:  aspirin delayed-release 81 mg tablet Your next dose is: Today Dose:  81 mg Take 1 Tab by mouth two (2) times a day. Quantity:  60 Tab Refills:  0  
     
   
   
  
   
  
 cyclobenzaprine 10 mg tablet Commonly known as:  FLEXERIL Your next dose is: Today Dose:  5 mg Take 0.5 Tabs by mouth three (3) times daily. Quantity:  60 Tab Refills:  0  
     
   
   
  
   
  
 gabapentin 100 mg capsule Commonly known as:  NEURONTIN Your next dose is: Today Dose:  100 mg Take 1 Cap by mouth two (2) times a day. Quantity:  60 Cap Refills:  0 HYDROmorphone 2 mg tablet Commonly known as:  DILAUDID Your next dose is:  CHANGED TO OXYCODONE Dose:  2 mg Take 1 Tab by mouth every four (4) hours as needed. Max Daily Amount: 12 mg. Quantity:  90 Tab Refills:  0  
     
   
   
   
  
 oxyCODONE IR 10 mg Tab immediate release tablet Commonly known as:  Meltatoe Amritaer Your next dose is: Today Dose:  10 mg Take 1 Tab by mouth every three (3) hours as needed. Max Daily Amount: 80 mg.  
 Quantity:  90 Tab Refills:  0  
     
   
   
  
   
  
 senna-docusate 8.6-50 mg per tablet Commonly known as:  Kahlil Greenwood Your next dose is:  Tomorrow Dose:  2 Tab Take 2 Tabs by mouth daily. Quantity:  120 Tab Refills:  0  
     
  
   
   
   
  
 zolpidem 5 mg tablet Commonly known as:  AMBIEN Your next dose is: Today Dose:  5 mg Take 1 Tab by mouth nightly as needed for Sleep. Max Daily Amount: 5 mg. Quantity:  30 Tab Refills:  0 CONTINUE these medications which have NOT CHANGED Dose & Instructions Dispensing Information Comments Morning Noon Evening Bedtime  
 atorvastatin 40 mg tablet Commonly known as:  LIPITOR Your next dose is: Today Dose:  40 mg Take 40 mg by mouth nightly. Refills:  0  
     
   
   
   
  
  
 ezetimibe 10 mg tablet Commonly known as:  Alondra Po Your next dose is:  Tomorrow Dose:  10 mg Take 10 mg by mouth daily. Refills:  0  
     
  
   
   
   
  
 fenofibrate 160 mg tablet Commonly known as:  LOFIBRA Your next dose is:  Tomorrow Dose:  160 mg Take 160 mg by mouth daily. Refills:  0 FLUoxetine 20 mg capsule Commonly known as:  PROzac Your next dose is:  Tomorrow Dose:  20 mg Take 20 mg by mouth daily. Take DOS per anesthesia protocol. Refills:  0  
     
  
   
   
   
  
 isosorbide mononitrate ER 30 mg tablet Commonly known as:  IMDUR Your next dose is:  Tomorrow Dose:  30 mg Take 30 mg by mouth daily. Take DOS per anesthesia protocol. Refills:  0  
     
  
   
   
   
  
 lisinopril 10 mg tablet Commonly known as:  Tanda Limerick Your next dose is:  Tomorrow Dose:  10 mg Take 10 mg by mouth daily. Refills:  0  
     
  
   
   
   
  
 metoprolol tartrate 25 mg tablet Commonly known as:  LOPRESSOR Your next dose is: Today Dose:  25 mg Take 25 mg by mouth two (2) times a day. Take DOS per anesthesia protocol. Refills:  0 MYRBETRIQ 50 mg ER tablet Generic drug:  mirabegron ER Your next dose is: Today Dose:  50 mg Take 50 mg by mouth nightly. Non-formulary. Patient instructed to bring to the hospital on the DOS, in the original bottle, and give to nurse. Refills:  0  
     
   
   
   
  
  
 olopatadine 0.6 % Spry Commonly known as:  PATANASE Your next dose is: Today Dose:  2 Squirt 2 Squirts by Both Nostrils route two (2) times a day. Non-formulary. Patient instructed to bring to the hospital on the DOS, in the original bottle, and give to nurse. Refills:  0  
     
   
   
  
   
  
 sulindac 150 mg tablet Commonly known as:  CLINORIL Your next dose is: Today Dose:  150 mg Take 150 mg by mouth two (2) times a day. Non-formulary. Patient instructed to bring to the hospital on the DOS, in the original bottle, and give to nurse. Refills:  0 STOP taking these medications   
 aspirin delayed-release 81 mg tablet Replaced by:  aspirin 81 mg chewable tablet Where to Get Your Medications Information on where to get these meds will be given to you by the nurse or doctor. ! Ask your nurse or doctor about these medications  
  acetaminophen 500 mg tablet  
 aspirin 81 mg chewable tablet  
 cyclobenzaprine 10 mg tablet  
 gabapentin 100 mg capsule HYDROmorphone 2 mg tablet  
 oxyCODONE IR 10 mg Tab immediate release tablet  
 senna-docusate 8.6-50 mg per tablet  
 zolpidem 5 mg tablet Discharge Instructions [employer] [facility] [employer] Patient Discharge Instructions Hailey Kan / [id number] : 1950 Admitted 2017 Discharged: 2017 IF YOU HAVE ANY PROBLEMS ONCE YOU ARE AT HOME CALL THE FOLLOWING NUMBERS:  
Main office number: [phone] Take Home Medications · It is important that you take the medication exactly as they are prescribed. · Keep your medication in the bottles provided by the pharmacist and keep a list of the medication names, dosages, and times to be taken in your wallet. · Do not take other medications without consulting your doctor. What to do at [facility] Resume your prehospital diet. If you have excessive nausea or vomitting call your doctor's office Home Physical Therapy is arranged. Use rolling walker when walking.  Use Cale Hose stockings until we see you in the office for your follow up appointment with Dr. Kelly Lagunas. Patients who have had a joint replacement should not drive until you are seen for your follow up appointment by Dr. Kelly Lagunas. When to Call - Call if you have a temperature greater then 101 
- Unable to keep food down - Loose control of your bladder or bowel function - Are unable to bear any weight  
- Need a pain medication refill DISCHARGE SUMMARY from Nurse The following personal items collected during your admission are returned to you:  
Dental Appliance: Dental Appliances: None Vision: Visual Aid: Glasses Hearing Aid:   na 
Jewelry: Jewelry: None Clothing: Clothing: At bedside Other Valuables: Other Valuables: Cell Phone, [phone] Valuables sent to safe:   na 
 
PATIENT INSTRUCTIONS: 
 
After general anesthesia or intravenous sedation, for 24 hours or while taking prescription Narcotics: · Limit your activities · Do not drive and operate hazardous machinery · Do not make important personal or business decisions · Do  not drink alcoholic beverages · If you have not urinated within 8 hours after discharge, please contact your surgeon on call. Report the following to your surgeon: 
· Excessive pain, swelling, redness or odor of or around the surgical area · Temperature over 101 · Nausea and vomiting lasting longer than 4 hours or if unable to take medications · Any signs of decreased circulation or nerve impairment to extremity: change in color, persistent  numbness, tingling, coldness or increase pain · Any questions, call office @ [phone] Keep scheduled follow up appointment. If need to change, call office @ [phone]. *  Please give a list of your current medications to your Primary Care Provider. *  Please update this list whenever your medications are discontinued, doses are 
    changed, or new medications (including over-the-counter products) are added.  
 
*  Please carry medication information at all times in case of emergency situations. Total Knee Replacement: What to Expect at Home Your Recovery When you leave the hospital, you should be able to move around with a walker or crutches. But you will need someone to help you at home for the next few weeks or until you have more energy and can move around better. If there is no one to help you at home, you may go to a rehabilitation center. You will go home with a bandage and stitches or staples. Change the bandage as your doctor tells you to. Your doctor will remove your stitches or staples 10 to 21 days after your surgery. You may still have some mild pain, and the area may be swollen for 3 to 6 months after surgery. Your knee will continue to improve for 6 to 12 months. You will probably use a walker for 1 to 3 weeks and then use crutches. When you are ready, you can use a cane. You will probably be able to walk on your own in 4 to 8 weeks. You will need to do months of physical rehabilitation (rehab) after a knee replacement. Rehab will help you strengthen the muscles of the knee and help you regain movement. After you recover, your artificial knee will allow you to do normal daily activities with less pain or no pain at all. You may be able to hike, dance, ride a bike, and play golf. Talk to your doctor about whether you can do more strenuous activities. Always tell your caregivers that you have an artificial knee. How long it will take to walk on your own, return to normal activities, and go back to work depends on your health and how well your rehabilitation (rehab) program goes. The better you do with your rehab exercises, the quicker you will get your strength and movement back. This care sheet gives you a general idea about how long it will take for you to recover. But each person recovers at a different pace. Follow the steps below to get better as quickly as possible. How can you care for yourself at home? Activity · Rest when you feel tired. You may take a nap, but do not stay in bed all day. When you sit, use a chair with arms. You can use the arms to help you stand up. · Work with your physical therapist to find the best way to exercise. You may be able to take frequent, short walks using crutches or a walker. What you can do as your knee heals will depend on whether your new knee is cemented or uncemented. You may not be able to do certain things for a while if your new knee is uncemented. · After your knee has healed enough, you can do more strenuous activities with caution. ¨ You can golf, but use a golf cart, and do not wear shoes with spikes. ¨ You can bike on a flat road or on a stationary bike. Avoid biking up hills. ¨ Your doctor may suggest that you stay away from activities that put stress on your knee. These include tennis or badminton, squash or racquetball, contact sports like football, jumping (such as in basketball), jogging, or running. ¨ Avoid activities where you might fall. These include horseback riding, skiing, and mountain biking. · Do not sit for more than 1 hour at a time. Get up and walk around for a while before you sit again. If you must sit for a long time, prop up your leg with a chair or footstool. This will help you avoid swelling. · Ask your doctor when you can shower. You may need to take sponge baths until your stitches or staples have been removed. · Ask your doctor when you can drive again. It may take up to 8 weeks after knee replacement surgery before it is safe for you to drive. · When you get into a car, sit on the edge of the seat. Then pull in your legs, and turn to face the front. · You should be able to do many everyday activities 3 to 6 weeks after your surgery. You will probably need to take 4 to 16 weeks off from work. When you can go back to work depends on the type of work you do and how you feel. · Ask your doctor when it is okay for you to have sex. · Do not lift anything heavier than 10 pounds and do not lift weights for 12 weeks. Diet · By the time you leave the hospital, you should be eating your normal diet. If your stomach is upset, try bland, low-fat foods like plain rice, broiled chicken, toast, and yogurt. Your doctor may suggest that you take iron and vitamin supplements. · Drink plenty of fluids (unless your doctor tells you not to). · Eat healthy foods, and watch your portion sizes. Try to stay at your ideal weight. Too much weight puts more stress on your new knee. · You may notice that your bowel movements are not regular right after your surgery. This is common. Try to avoid constipation and straining with bowel movements. You may want to take a fiber supplement every day. If you have not had a bowel movement after a couple of days, ask your doctor about taking a mild laxative. Medicines · Your doctor will tell you if and when you can restart your medicines. He or she will also give you instructions about taking any new medicines. · If you take blood thinners, such as warfarin (Coumadin), clopidogrel (Plavix), or aspirin, be sure to talk to your doctor. He or she will tell you if and when to start taking those medicines again. Make sure that you understand exactly what your doctor wants you to do. · Your doctor may give you a blood-thinning medicine to prevent blood clots. If you take a blood thinner, be sure you get instructions about how to take your medicine safely. Blood thinners can cause serious bleeding problems. This medicine could be in pill form or as a shot (injection). If a shot is necessary, your doctor will tell you how to do this. · Be safe with medicines. Take pain medicines exactly as directed. ¨ If the doctor gave you a prescription medicine for pain, take it as prescribed. ¨ If you are not taking a prescription pain medicine, ask your doctor if you can take an over-the-counter medicine. ¨ Plan to take your pain medicine 30 minutes before exercises. It is easier to prevent pain before it starts than to stop it once it has started. · If you think your pain medicine is making you sick to your stomach: 
¨ Take your medicine after meals (unless your doctor has told you not to). ¨ Ask your doctor for a different pain medicine. · If your doctor prescribed antibiotics, take them as directed. Do not stop taking them just because you feel better. You need to take the full course of antibiotics. Incision care · You will have a bandage over the cut (incision) and staples or stitches. Take the bandage off when your doctor says it is okay. · Your doctor will remove the staples or stitches 10 days to 3 weeks after the surgery and replace them with strips of tape. Leave the tape on for a week or until it falls off. Exercise · Your rehab program will give you a number of exercises to do to help you get back your knee's range of motion and strength. Always do them as your therapist tells you. Ice and elevation · For pain and swelling, put ice or a cold pack on the area for 10 to 20 minutes at a time. Put a thin cloth between the ice and your skin. Other instructions · Continue to wear your support stockings as your doctor says. These help to prevent blood clots. The length of time that you will have to wear them depends on your activity level and the amount of swelling. · Wear medical alert jewelry that says you may need antibiotics before any procedure, including dental work. You can buy this at most drugstores. · You have metal pieces in your knee. These may set off some airport metal detectors. Carry a medical alert card that says you have an artificial joint, just in case. Follow-up care is a key part of your treatment and safety. Be sure to make and go to all appointments, and call your doctor if you are having problems.  It's also a good idea to know your test results and keep a list of the medicines you take. When should you call for help? Call 911 anytime you think you may need emergency care. For example, call if: 
· You passed out (lost consciousness). · You have severe trouble breathing. · You have sudden chest pain and shortness of breath, or you cough up blood. Call your doctor now or seek immediate medical care if: 
· You have signs of infection, such as: 
¨ Increased pain, swelling, warmth, or redness. ¨ Red streaks leading from the incision. ¨ Pus draining from the incision. ¨ A fever. · You have signs of a blood clot, such as: 
¨ Pain in your calf, back of the knee, thigh, or groin. ¨ Redness and swelling in your leg or groin. · Your incision comes open and begins to bleed, or the bleeding increases. · You have pain that does not get better after you take pain medicine. Watch closely for changes in your health, and be sure to contact your doctor if: 
· You do not have a bowel movement after taking a laxative. Where can you learn more? Go to http://danny-felecia.info/. Enter W819 in the search box to learn more about \"Total Knee Replacement: What to Expect at Home. \" Current as of: August 4, 2016 Content Version: 11.2 © 2562-9313 [employer]. Care instructions adapted under license by [employer] (which disclaims liability or warranty for this information). If you have questions about a medical condition or this instruction, always ask your healthcare professional. [place] [address] any warranty or liability for your use of this information. These are general instructions for a healthy lifestyle: No smoking/ No tobacco products/ Avoid exposure to second hand smoke Surgeon General's Warning:  Quitting smoking now greatly reduces serious risk to your health. Obesity, smoking, and sedentary lifestyle greatly increases your risk for illness A healthy diet, regular physical exercise & weight monitoring are important for maintaining a healthy lifestyle You may be retaining fluid if you have a history of heart failure or if you experience any of the following symptoms:  Weight gain of 3 pounds or more overnight or 5 pounds in a week, increased swelling in our hands or feet or shortness of breath while lying flat in bed. Please call your doctor as soon as you notice any of these symptoms; do not wait until your next office visit. Recognize signs and symptoms of STROKE: 
 
F-face looks uneven A-arms unable to move or move even S-speech slurred or non-existent T-time-call 911 as soon as signs and symptoms begin-DO NOT go Back to bed or wait to see if you get better-TIME IS BRAIN. The discharge information has been reviewed with the patient. The patient verbalized understanding. Information obtained by : 
I understand that if any problems occur once I am at home I am to contact my physician. I understand and acknowledge receipt of the instructions indicated above. Physician's or R.N.'s Signature                                                                  Date/Time Patient or Representative Signature                                                          Date/Time Discharge Orders None Introducing [facility]! [facility] introduces SmartRx patient portal. Now you can access parts of your medical record, email your doctor's office, and request medication refills online. 1. In your internet browser, go to [email or web address] 2. Click on the First Time User? Click Here link in the Sign In box.  You will see the New Member Sign Up page. 3. Enter your [employer] Access Code exactly as it appears below. You will not need to use this code after youve completed the sign-up process. If you do not sign up before the expiration date, you must request a new code. · [employer] Access Code: [email or web address] Expires: 6/7/2017 12:48 PM 
 
4. Enter the last four digits of your Social Security Number (xxxx) and Date of Birth (mm/dd/yyyy) as indicated and click Submit. You will be taken to the next sign-up page. 5. Create a [employer] ID. This will be your [employer] login ID and cannot be changed, so think of one that is secure and easy to remember. 6. Create a [employer] password. You can change your password at any time. 7. Enter your Password Reset Question and Answer. This can be used at a later time if you forget your password. 8. Enter your e-mail address. You will receive e-mail notification when new information is available in [address]. 9. Click Sign Up. You can now view and download portions of your medical record. 10. Click the Download Summary menu link to download a portable copy of your medical information. If you have questions, please visit the Frequently Asked Questions section of the [employer] website. Remember, [employer] is NOT to be used for urgent needs. For medical emergencies, dial 911. Now available from your iPhone and Android! General Information Please provide this summary of care documentation to your next provider. Patient Signature:  ____________________________________________________________ Date:  ____________________________________________________________  
  
Augustine Garcia Provider Signature:  ____________________________________________________________ Date:  ____________________________________________________________

## 2017-05-17 NOTE — PERIOP NOTES
TRANSFER - OUT REPORT:    Verbal report given to Kale Oleary RN on Lonnie Connors  being transferred to [facility] for routine progression of care       Report consisted of patients Situation, Background, Assessment and   Recommendations(SBAR). Information from the following report(s) SBAR, Kardex and Recent Results was reviewed with the receiving nurse. Lines:   Peripheral IV 05/17/17 Left Wrist (Active)   Site Assessment Clean, dry, & intact 5/17/2017  9:13 AM   Phlebitis Assessment 0 5/17/2017  9:13 AM   Infiltration Assessment 0 5/17/2017  9:13 AM   Dressing Status Clean, dry, & intact 5/17/2017  9:13 AM   Dressing Type Tape;Transparent 5/17/2017  9:13 AM   Hub Color/Line Status Infusing 5/17/2017  9:13 AM   Action Taken Blood drawn 5/17/2017  9:13 AM        Opportunity for questions and clarification was provided.       Patient transported with:   [employer]

## 2017-05-17 NOTE — PROGRESS NOTES
Problem: Self Care Deficits Care Plan (Adult)  Goal: *Acute Goals and Plan of Care (Insert Text)  GOALS:   DISCHARGE GOALS (in preparation for going home/rehab): 3 days  1. Mr. Nataly Chavez will perform one lower body dressing activity with minimal assistance required to demonstrate improved functional mobility and safety. 2. Mr. Nataly Chavez will perform one lower body bathing activity with minimal assistance required to demonstrate improved functional mobility and safety. 3. Mr. Nataly Chavez will perform toileting/toilet transfer with contact guard assistance to demonstrate improved functional mobility and safety. 4. Mr. Nataly Chavez will perform shower transfer with contact guard assistance to demonstrate improved functional mobility and safety. JOINT CAMP OCCUPATIONAL THERAPY TKA: Initial Assessment and PM 5/17/2017  INPATIENT: Hospital Day: 1  Payor: Magaly Alcaraz / Plan: [employer] / Product Type: HMO /      NAME/AGE/GENDER: James Bustamante is a 73 y.o. male             PRIMARY DIAGNOSIS:  Primary osteoarthritis of left knee [M17.12]              Procedure(s) and Anesthesia Type:     * LEFT KNEE ARTHROPLASTY TOTAL - Spinal (Left)  ICD-10: Treatment Diagnosis:        · Pain in Left Knee (M25.562)  · Stiffness of Left Knee, Not elsewhere classified (N69.733)  · Other lack of cordination (R27.8)       ASSESSMENT:      Mr. Nataly Chavez is s/p left TKA and presents with decreased weight bearing on left LE and decreased independence with functional mobility and activities of daily living. Patient would benefit from skilled Occupational Therapy to maximize independence and safety with self-care task and functional mobility. Pt would also benefit from education on adaptive equipment and safety precautions in preparation for going home or for recommendations for post-hospital rehab program.  Patient plans for further rehab at home with home health services and good family support .   OT reviewed therapy schedule and plan of care with patient. Patient was able to transfer and preform self care skills as charted below. Patient instructed to call for assistance when needing to get up from the bed and all needs in reach. Patient verbalized understanding of call light. This section established at most recent assessment   PROBLEM LIST (Impairments causing functional limitations):  1. Decreased Strength  2. Decreased ADL/Functional Activities  3. Decreased Transfer Abilities  4. Increased Pain  5. Increased Fatigue  6. Decreased Flexibility/Joint Mobility  7. Decreased Knowledge of Precautions    INTERVENTIONS PLANNED: (Benefits and precautions of occupational therapy have been discussed with the patient.)  1. Activities of daily living training  2. Adaptive equipment training  3. Balance training  4. Clothing management  5. Donning&doffing training  6. Theraputic activity      TREATMENT PLAN: Frequency/Duration: Follow patient 1 time to address above goals. Rehabilitation Potential For Stated Goals: GOOD      RECOMMENDED REHABILITATION/EQUIPMENT: (at time of discharge pending progress): Continue Skilled Therapy and Home Health: Physical Therapy. OCCUPATIONAL PROFILE AND HISTORY:   History of Present Injury/Illness (Reason for Referral): Pt presents this date s/p (left) TKA. Past Medical History/Comorbidities:   Mr. Sarina Horne  has a past medical history of Benign prostatic hyperplasia; CAD (coronary artery disease); Depression; Diastolic dysfunction; Elevated serum creatinine (04/24/2017); Former smoker; H/O echocardiogram (07/24/2014); Hypercholesteremia; Hypertension; Osteoarthritis; and Overactive bladder. He also has no past medical history of Adverse effect of anesthesia; Difficult intubation; Malignant hyperthermia due to anesthesia; Nausea & vomiting; or Pseudocholinesterase deficiency. Mr. Sarina Horne  has a past surgical history that includes urological (2013) and colonoscopy.   Social History/Living Environment: Home Environment: Private residence  # Steps to Enter: 3  Rails to Enter: Yes  Hand Rails : Right  One/Two Story Residence: One story  Living Alone: No  Support Systems: Spouse/Significant Other/Partner  Patient Expects to be Discharged to[de-identified] Private residence  Current DME Used/Available at Home: Commode, bedside  Tub or Shower Type: Shower  Prior Level of Function/Work/Activity:  Mod I with ADLS and working      Number of Personal Factors/Comorbidities that affect the Plan of Care: Brief history (0):  LOW COMPLEXITY   ASSESSMENT OF OCCUPATIONAL PERFORMANCE[de-identified]   Most Recent Physical Functioning:            Patient Vitals for the past 6 hrs:       BP BP Patient Position SpO2 O2 Flow Rate (L/min) Pulse   05/17/17 1101 112/61 At rest 98 % 2 l/min (!) 58   05/17/17 1111 131/79 At rest 99 % 2 l/min 60   05/17/17 1114 126/65 At rest 98 % 2 l/min (!) 58   05/17/17 1119 124/66 At rest 98 % 2 l/min (!) 57   05/17/17 1124 128/66 At rest 98 % 2 l/min 60   05/17/17 1129 120/63 At rest 97 % 2 l/min 60   05/17/17 1144 120/62 At rest 97 % 2 l/min (!) 57   05/17/17 1359 100/57 Supine 97 % 2 l/min (!) 54   05/17/17 1404 104/55 Head of bed elevated (Comment degrees) 99 % - (!) 53   05/17/17 1409 104/55 - 99 % - (!) 54   05/17/17 1414 98/53 Head of bed elevated (Comment degrees) 98 % 2 l/min (!) 54   05/17/17 1419 103/55 - 99 % 2 l/min (!) 52   05/17/17 1434 109/56 Head of bed elevated (Comment degrees) 99 % 2 l/min (!) 52   05/17/17 1444 122/80 - 96 % 2 l/min (!) 51   05/17/17 1505 - - 100 % 2 l/min -   05/17/17 1509 - - 100 % 0 l/min -   05/17/17 1548 118/74 - 100 % - (!) 57                       Coordination  Fine Motor Skills-Upper: Left Intact; Right Intact  Gross Motor Skills-Upper: Left Intact; Right Intact           Mental Status  Neurologic State: Alert; Appropriate for age  Orientation Level: Appropriate for age;Oriented X4                    Basic ADLs (From Assessment) Complex ADLs (From Assessment)   Basic ADL  Feeding: Setup  Oral Facial Hygiene/Grooming: Supervision  Bathing: Moderate assistance  Upper Body Dressing: Supervision  Lower Body Dressing: Moderate assistance  Toileting: Total assistance (catheter)     Grooming/Bathing/Dressing Activities of Daily Living                       Functional Transfers  Toilet Transfer : Minimum assistance  Shower Transfer: Minimum assistance                 Physical Skills Involved:  1. Range of Motion  2. Balance  3. Mobility Cognitive Skills Affected (resulting in the inability to perform in a timely and safe manner): 1. none Psychosocial Skills Affected:  1. none   Number of elements that affect the Plan of Care: 1-3:  LOW COMPLEXITY   CLINICAL DECISION MAKIN [facility] Mobility Inpatient Short Form  How much help from another person does the patient currently need. .. Total A Lot A Little None   1. Putting on and taking off regular lower body clothing?   [ ] 1   [X] 2   [ ] 3   [ ] 4   2. Bathing (including washing, rinsing, drying)? [ ] 1   [X] 2   [ ] 3   [ ] 4   3. Toileting, which includes using toilet, bedpan or urinal?   [X] 1   [ ] 2   [ ] 3   [ ] 4   4. Putting on and taking off regular upper body clothing?   [ ] 1   [ ] 2   [X] 3   [ ] 4   5. Taking care of personal grooming such as brushing teeth? [ ] 1   [ ] 2   [X] 3   [ ] 4   6. Eating meals? [ ] 1   [ ] 2   [ ] 3   [X] 4   © , Trustees of [facility] [employer], under license to [employer]. All rights reserved   Score:  Initial: 15 Most Recent: X (Date: -- )     Interpretation of Tool:  Represents activities that are increasingly more difficult (i.e. Bed mobility, Transfers, Gait).        Score 24 23 22-20 19-15 14-10 9-7 6       Modifier CH CI CJ CK CL CM CN         · Self Care:               - CURRENT STATUS:    CK - 40%-59% impaired, limited or restricted               - GOAL STATUS:           CJ - 20%-39% impaired, limited or restricted               - D/C STATUS:                       ---------------To be determined---------------  Payor: Hardik Massey / Plan: [employer] / Product Type: HMO /       Medical Necessity:     · Patient is expected to demonstrate progress in balance, coordination and functional technique to decrease assistance required with self care and functional mobility and improve safety during self care and functional mobility. Reason for Services/Other Comments:  · Patient continues to require skilled intervention due to decreased self care and functional mobility. Use of outcome tool(s) and clinical judgement create a POC that gives a: LOW COMPLEXITY                 TREATMENT:   (In addition to Assessment/Re-Assessment sessions the following treatments were rendered)      Pre-treatment Symptoms/Complaints:  none  Pain: Initial:   Pain Intensity 1: 0  Post Session:  0/10 iceman in place      Assessment/Reassessment only, no treatment provided today     Treatment/Session Assessment:         Response to Treatment:  Tolerated well, up in chair. Education:  [ ] Home Exercises  [X] Fall Precautions  [ ] Hip Precautions [ ] Going Home Video  [X] Knee/Hip Prosthesis Review  [X] Walker Management/Safety [X] Adaptive Equipment as Needed         Interdisciplinary Collaboration:   · Physical Therapist  · Occupational Therapist  · Registered Nurse     After treatment position/precautions:   · Up in chair  · Bed alarm/tab alert on  · Bed/Chair-wheels locked  · Call light within reach  · RN notified  · Family at bedside     Compliance with Program/Exercises: compliant all of the time. Recommendations/Intent for next treatment session:  Treatment next visit will focus on increasing Mr. Jackson independence with  self care and functional mobility modalities for pain, and patient education.        Total Treatment Duration:  OT Patient Time In/Time Out  Time In: 1525  Time Out: 8439 Brennan Barker, OT

## 2017-05-17 NOTE — PROGRESS NOTES
- Visited as requested, to offer spiritual support and prayer prior to surgery. Pt expressed appreciation for visit/prayer.      Swapna Pastor MDiv, [facility] 82

## 2017-05-17 NOTE — PERIOP NOTES
TRANSFER - OUT REPORT:    Verbal report given to Fahad Conn RN on [place] [facility]  being transferred to [facility] [phone] for routine post - op       Report consisted of patients Situation, Background, Assessment and   Recommendations(SBAR). Information from the following report(s) OR Summary, Procedure Summary, Intake/Output and MAR was reviewed with the receiving nurse. Opportunity for questions and clarification was provided.       Patient transported with:   O2 @ 2 liters

## 2017-05-17 NOTE — ANESTHESIA PROCEDURE NOTES
Peripheral Block    Start time: 5/17/2017 11:11 AM  End time: 5/17/2017 11:14 AM  Performed by: Norma Rivera  Authorized by: Norma Rivera       Pre-procedure: Indications: at surgeon's request and post-op pain management    Preanesthetic Checklist: patient identified, risks and benefits discussed, site marked, timeout performed, anesthesia consent given and patient being monitored    Timeout Time: 11:11          Block Type:   Block Type:   Adductor canal  Laterality:  Left  Monitoring:  Responsive to questions, continuous pulse ox, oxygen, frequent vital sign checks and heart rate  Injection Technique:  Single shot  Procedures: ultrasound guided    Patient Position: supine  Prep: chlorhexidine    Location:  Upper thigh  Needle Type:  Stimuplex  Needle Gauge:  21 G  Needle Localization:  Ultrasound guidance  Medication Injected:  0.2%  ropivacaine  Adds:  Epi 1:200K  Volume (mL):  30    Assessment:  Number of attempts:  1  Injection Assessment:  Incremental injection every 5 mL, negative aspiration for CSF, no paresthesia, ultrasound image on chart, no intravascular symptoms, negative aspiration for blood and local visualized surrounding nerve on ultrasound  Patient tolerance:  Patient tolerated the procedure well with no immediate complications

## 2017-05-17 NOTE — PROGRESS NOTES
05/17/17 1505   Oxygen Therapy   O2 Sat (%) 100 %   Pulse via Oximetry 47 beats per minute   O2 Device Nasal cannula   O2 Flow Rate (L/min) 2 l/min   FIO2 (%) 28 %   Pt instructed in the use of Incentive Spirometry. Joint camp notes reviewed; C/S # 7 at bedside.  No distress noted

## 2017-05-17 NOTE — ANESTHESIA PROCEDURE NOTES
Spinal Block    Start time: 5/17/2017 12:00 PM  End time: 5/17/2017 12:04 PM  Performed by: Mercy Gonzales  Authorized by: Mercy Gonzales     Pre-procedure:   Indications: primary anesthetic  Preanesthetic Checklist: patient identified, risks and benefits discussed, anesthesia consent, patient being monitored and timeout performed    Timeout Time: 12:00          Spinal Block:   Patient Position:  Seated  Prep Region:  Lumbar  Prep: chlorhexidine and patient draped      Location:  L3-4  Technique:  Single shot  Local:  Lidocaine 1%  Local Dose (mL):  3    Needle:   Needle Type:  Pencan  Needle Gauge:  25 G  Attempts:  1      Events: CSF confirmed, no blood with aspiration and no paresthesia        Assessment:  Insertion:  Uncomplicated  Patient tolerance:  Patient tolerated the procedure well with no immediate complications

## 2017-05-18 LAB
ANION GAP BLD CALC-SCNC: 7 MMOL/L (ref 7–16)
BUN SERPL-MCNC: 24 MG/DL (ref 8–23)
CALCIUM SERPL-MCNC: 8.6 MG/DL (ref 8.3–10.4)
CHLORIDE SERPL-SCNC: 104 MMOL/L (ref 98–107)
CO2 SERPL-SCNC: 27 MMOL/L (ref 21–32)
CREAT SERPL-MCNC: 1.59 MG/DL (ref 0.8–1.5)
GLUCOSE BLD STRIP.AUTO-MCNC: 126 MG/DL (ref 65–100)
GLUCOSE BLD STRIP.AUTO-MCNC: 131 MG/DL (ref 65–100)
GLUCOSE SERPL-MCNC: 112 MG/DL (ref 65–100)
HGB BLD-MCNC: 11.1 G/DL (ref 13.6–17.2)
MM INDURATION POC: 0 MM (ref 0–5)
POTASSIUM SERPL-SCNC: 4.2 MMOL/L (ref 3.5–5.1)
PPD POC: 0 NEGATIVE
SODIUM SERPL-SCNC: 138 MMOL/L (ref 136–145)

## 2017-05-18 PROCEDURE — 36415 COLL VENOUS BLD VENIPUNCTURE: CPT | Performed by: ORTHOPAEDIC SURGERY

## 2017-05-18 PROCEDURE — 94760 N-INVAS EAR/PLS OXIMETRY 1: CPT

## 2017-05-18 PROCEDURE — 97150 GROUP THERAPEUTIC PROCEDURES: CPT

## 2017-05-18 PROCEDURE — 97116 GAIT TRAINING THERAPY: CPT

## 2017-05-18 PROCEDURE — 85018 HEMOGLOBIN: CPT | Performed by: ORTHOPAEDIC SURGERY

## 2017-05-18 PROCEDURE — 82962 GLUCOSE BLOOD TEST: CPT

## 2017-05-18 PROCEDURE — 80048 BASIC METABOLIC PNL TOTAL CA: CPT | Performed by: ORTHOPAEDIC SURGERY

## 2017-05-18 PROCEDURE — 97110 THERAPEUTIC EXERCISES: CPT

## 2017-05-18 PROCEDURE — 65270000029 HC RM PRIVATE

## 2017-05-18 PROCEDURE — 74011250636 HC RX REV CODE- 250/636: Performed by: ORTHOPAEDIC SURGERY

## 2017-05-18 PROCEDURE — 74011250637 HC RX REV CODE- 250/637: Performed by: ORTHOPAEDIC SURGERY

## 2017-05-18 PROCEDURE — 77010033678 HC OXYGEN DAILY

## 2017-05-18 PROCEDURE — 94762 N-INVAS EAR/PLS OXIMTRY CONT: CPT

## 2017-05-18 RX ORDER — GABAPENTIN 100 MG/1
100 CAPSULE ORAL 2 TIMES DAILY
Qty: 60 CAP | Refills: 0 | Status: SHIPPED | OUTPATIENT
Start: 2017-05-18

## 2017-05-18 RX ORDER — GUAIFENESIN 100 MG/5ML
81 LIQUID (ML) ORAL 2 TIMES DAILY
Qty: 60 TAB | Refills: 0 | Status: SHIPPED | OUTPATIENT
Start: 2017-05-18

## 2017-05-18 RX ORDER — AMOXICILLIN 250 MG
2 CAPSULE ORAL DAILY
Qty: 120 TAB | Refills: 0 | Status: SHIPPED | OUTPATIENT
Start: 2017-05-18

## 2017-05-18 RX ORDER — OXYCODONE HYDROCHLORIDE 5 MG/1
10 TABLET ORAL
Status: DISCONTINUED | OUTPATIENT
Start: 2017-05-18 | End: 2017-05-19 | Stop reason: HOSPADM

## 2017-05-18 RX ORDER — ZOLPIDEM TARTRATE 5 MG/1
5 TABLET ORAL
Qty: 30 TAB | Refills: 0 | Status: SHIPPED | OUTPATIENT
Start: 2017-05-18

## 2017-05-18 RX ORDER — CYCLOBENZAPRINE HCL 10 MG
5 TABLET ORAL 3 TIMES DAILY
Qty: 60 TAB | Refills: 0 | Status: SHIPPED | OUTPATIENT
Start: 2017-05-18

## 2017-05-18 RX ORDER — HYDROMORPHONE HYDROCHLORIDE 2 MG/1
2 TABLET ORAL
Qty: 90 TAB | Refills: 0 | Status: SHIPPED | OUTPATIENT
Start: 2017-05-18

## 2017-05-18 RX ORDER — ACETAMINOPHEN 500 MG
1000 TABLET ORAL EVERY 6 HOURS
Qty: 120 TAB | Refills: 0 | Status: SHIPPED | OUTPATIENT
Start: 2017-05-18

## 2017-05-18 RX ADMIN — CEFAZOLIN 2 G: 1 INJECTION, POWDER, FOR SOLUTION INTRAMUSCULAR; INTRAVENOUS; PARENTERAL at 05:04

## 2017-05-18 RX ADMIN — TRANEXAMIC ACID 650 MG: 650 TABLET ORAL at 17:32

## 2017-05-18 RX ADMIN — ACETAMINOPHEN 1000 MG: 500 TABLET, FILM COATED ORAL at 00:17

## 2017-05-18 RX ADMIN — OXYCODONE HYDROCHLORIDE 10 MG: 5 TABLET ORAL at 23:32

## 2017-05-18 RX ADMIN — OXYCODONE HYDROCHLORIDE 10 MG: 10 TABLET, FILM COATED, EXTENDED RELEASE ORAL at 21:30

## 2017-05-18 RX ADMIN — FENOFIBRATE 160 MG: 160 TABLET ORAL at 09:01

## 2017-05-18 RX ADMIN — ASPIRIN 81 MG CHEWABLE TABLET 81 MG: 81 TABLET CHEWABLE at 17:32

## 2017-05-18 RX ADMIN — OXYCODONE HYDROCHLORIDE 10 MG: 5 TABLET ORAL at 18:30

## 2017-05-18 RX ADMIN — ACETAMINOPHEN 1000 MG: 500 TABLET, FILM COATED ORAL at 17:32

## 2017-05-18 RX ADMIN — GABAPENTIN 100 MG: 100 CAPSULE ORAL at 09:01

## 2017-05-18 RX ADMIN — LISINOPRIL AND HYDROCHLOROTHIAZIDE 1 TABLET: 12.5; 1 TABLET ORAL at 09:01

## 2017-05-18 RX ADMIN — CYCLOBENZAPRINE HYDROCHLORIDE 5 MG: 10 TABLET, FILM COATED ORAL at 15:38

## 2017-05-18 RX ADMIN — ACETAMINOPHEN 1000 MG: 500 TABLET, FILM COATED ORAL at 12:52

## 2017-05-18 RX ADMIN — TRANEXAMIC ACID 650 MG: 650 TABLET ORAL at 09:00

## 2017-05-18 RX ADMIN — METOPROLOL TARTRATE 25 MG: 25 TABLET ORAL at 09:01

## 2017-05-18 RX ADMIN — OLOPATADINE HYDROCHLORIDE 2 SQUIRT: 665 SPRAY NASAL at 09:00

## 2017-05-18 RX ADMIN — ASPIRIN 81 MG CHEWABLE TABLET 81 MG: 81 TABLET CHEWABLE at 09:01

## 2017-05-18 RX ADMIN — SODIUM CHLORIDE 100 ML/HR: 900 INJECTION, SOLUTION INTRAVENOUS at 02:52

## 2017-05-18 RX ADMIN — HYDROMORPHONE HYDROCHLORIDE 2 MG: 2 TABLET ORAL at 00:17

## 2017-05-18 RX ADMIN — HYDROMORPHONE HYDROCHLORIDE 1 MG: 1 INJECTION, SOLUTION INTRAMUSCULAR; INTRAVENOUS; SUBCUTANEOUS at 02:55

## 2017-05-18 RX ADMIN — CYCLOBENZAPRINE HYDROCHLORIDE 5 MG: 10 TABLET, FILM COATED ORAL at 21:30

## 2017-05-18 RX ADMIN — HYDROMORPHONE HYDROCHLORIDE 2 MG: 2 TABLET ORAL at 05:17

## 2017-05-18 RX ADMIN — GABAPENTIN 100 MG: 100 CAPSULE ORAL at 17:32

## 2017-05-18 RX ADMIN — HYDROMORPHONE HYDROCHLORIDE 2 MG: 2 TABLET ORAL at 12:52

## 2017-05-18 RX ADMIN — CYCLOBENZAPRINE HYDROCHLORIDE 5 MG: 10 TABLET, FILM COATED ORAL at 09:01

## 2017-05-18 RX ADMIN — EZETIMIBE 10 MG: 10 TABLET ORAL at 09:00

## 2017-05-18 RX ADMIN — ISOSORBIDE MONONITRATE 30 MG: 30 TABLET, EXTENDED RELEASE ORAL at 09:00

## 2017-05-18 RX ADMIN — ATORVASTATIN CALCIUM 40 MG: 40 TABLET, FILM COATED ORAL at 21:30

## 2017-05-18 RX ADMIN — HYDROMORPHONE HYDROCHLORIDE 2 MG: 2 TABLET ORAL at 09:00

## 2017-05-18 RX ADMIN — SENNOSIDES AND DOCUSATE SODIUM 2 TABLET: 8.6; 5 TABLET ORAL at 09:00

## 2017-05-18 RX ADMIN — Medication 5 ML: at 21:33

## 2017-05-18 RX ADMIN — FLUOXETINE 20 MG: 20 CAPSULE ORAL at 09:01

## 2017-05-18 RX ADMIN — METOPROLOL TARTRATE 25 MG: 25 TABLET ORAL at 21:30

## 2017-05-18 RX ADMIN — OXYCODONE HYDROCHLORIDE 10 MG: 5 TABLET ORAL at 15:38

## 2017-05-18 RX ADMIN — ACETAMINOPHEN 1000 MG: 500 TABLET, FILM COATED ORAL at 23:32

## 2017-05-18 RX ADMIN — OXYCODONE HYDROCHLORIDE 10 MG: 10 TABLET, FILM COATED, EXTENDED RELEASE ORAL at 09:01

## 2017-05-18 NOTE — PROGRESS NOTES
Problem: Mobility Impaired (Adult and Pediatric)  Goal: *Acute Goals and Plan of Care (Insert Text)  GOALS (1-4 days):  (1.)Mr. Damian Morgan will move from supine to sit and sit to supine in bed with SUPERVISION. (2.)Mr. Damian Morgan will transfer from bed to chair and chair to bed with STAND BY ASSIST using the least restrictive device. (3.)Mr. Damian Morgan will ambulate with STAND BY ASSIST for 200 feet with the least restrictive device. (4.)Mr. Damian Morgan will ambulate up/down 3 steps with left railing with CONTACT GUARD ASSIST with no device. (5.)Mr. Damian Morgan will increase left knee ROM to 5°-80°.  ________________________________________________________________________________________________      PHYSICAL THERAPY JOINT CAMP TKA: Daily Note, Treatment Day: 1st and AM 5/18/2017  INPATIENT: Hospital Day: 2  Payor: [employer] / Plan: [employer] HMO / Product Type: HMO /      NAME/AGE/GENDER: Lonnie Connors is a 73 y.o. male             PRIMARY DIAGNOSIS:  Primary osteoarthritis of left knee [M17.12]              Procedure(s) and Anesthesia Type:     * LEFT KNEE ARTHROPLASTY TOTAL - Spinal (Left)  ICD-10: Treatment Diagnosis:        · Pain in Left Knee (M25.562)  · Stiffness of Left Knee, Not elsewhere classified (M25.662)  · Difficulty in walking, Not elsewhere classified (R26.2)       ASSESSMENT:      Mr. Damian Morgan supine in bed on contact and agreeable to get up with therapy. Pt reports he is having more pain than yesterday with the block. Worked on bed mobility with increased time. Sit to stand with walker with minimal assist and verbal cues. Made it to the door and pt started get a little dizzy and appeared pale. Walked back to chair. Worked on TKA exercises in sitting with verbal cues. Pt having pain, RN notified. Hope to progress pt this afternoon. This section established at most recent assessment   PROBLEM LIST (Impairments causing functional limitations):  1. Decreased Strength  2.  Decreased ADL/Functional Activities  3. Decreased Transfer Abilities  4. Decreased Ambulation Ability/Technique  5. Decreased Balance  6. Increased Pain  7. Decreased Activity Tolerance  8. Decreased Flexibility/Joint Mobility  9. Decreased [place] with Home Exercise Program    INTERVENTIONS PLANNED: (Benefits and precautions of physical therapy have been discussed with the patient.)  1. Bed Mobility  2. Gait Training  3. Home Exercise Program (HEP)  4. Therapeutic Exercise/Strengthening  5. Transfer Training  6. Range of Motion: active/assisted/passive  7. Therapeutic Activities  8. Group Therapy      TREATMENT PLAN: Frequency/Duration: Follow patient BID   to address above goals. Rehabilitation Potential For Stated Goals: GOOD      RECOMMENDED REHABILITATION/EQUIPMENT: (at time of discharge pending progress): Continue Skilled Therapy and Home Health: Physical Therapy. HISTORY:   History of Present Injury/Illness (Reason for Referral):  HPI:  The patient has end stage arthritis of the left knee. The patient was evaluated and examined during a consultation prior to today. The patient complains of knee pain with activities, reports pain as mostly occurring along the joint lines, reports stiffness of the knee with prolonged inactivity, and swelling/pain at the end of the day and after increased physical activity. The pain affects the patients activities of daily living and quality of life. The patient has attempted and exhausted conservative treatment. There have been no changes to the patient's orthopedic condition since the last office visit. Past Medical History/Comorbidities:   Mr. Ephraim Oh  has a past medical history of Benign prostatic hyperplasia; CAD (coronary artery disease); Depression; Diastolic dysfunction; Elevated serum creatinine (04/24/2017); Former smoker; H/O echocardiogram (07/24/2014); Hypercholesteremia; Hypertension; Osteoarthritis; and Overactive bladder.  He also has no past medical history of Adverse effect of anesthesia; Difficult intubation; Malignant hyperthermia due to anesthesia; Nausea & vomiting; or Pseudocholinesterase deficiency. Mr. Mingo Sanders  has a past surgical history that includes urological (2013) and colonoscopy. Social History/Living Environment:   Home Environment: Private residence  # Steps to Enter: 3  Rails to Enter: Yes  Hand Rails : Right  One/Two Story Residence: One story  Living Alone: No  Support Systems: Spouse/Significant Other/Partner  Patient Expects to be Discharged to[de-identified] Private residence  Current DME Used/Available at Home: Commode, bedside  Tub or Shower Type: Shower  Prior Level of Function/Work/Activity:  Pt was independent without an assistive device prior to this admission   Number of Personal Factors/Comorbidities that affect the Plan of Care: 0: LOW COMPLEXITY   EXAMINATION:   Most Recent Physical Functioning:                               Bed Mobility  Supine to Sit: Contact guard assistance; Additional time  Sit to Supine:  (stayed up in chair)     Transfers  Sit to Stand: Minimum assistance  Stand to Sit: Minimum assistance     Balance  Sitting: Intact  Standing: Intact; With support          Gait Training: Yes     Weight Bearing Status  Left Side Weight Bearing: As tolerated  Distance (ft): 20 Feet (ft)  Ambulation - Level of Assistance: Minimal assistance  Assistive Device: Walker, rolling  Speed/Aide: Pace decreased (<100 feet/min); Shuffled  Step Length: Right shortened  Stance: Left decreased  Gait Abnormalities: Antalgic;Decreased step clearance; Step to gait  Interventions: Safety awareness training;Verbal cues      Braces/Orthotics: none     Left Knee Cold  Type: Cryocuff       Body Structures Involved:  1. Joints  2. Muscles Body Functions Affected:  1. Movement Related Activities and Participation Affected:  1.  Mobility   Number of elements that affect the Plan of Care: 4+: HIGH COMPLEXITY   CLINICAL PRESENTATION:   Presentation: Stable and uncomplicated: LOW COMPLEXITY   CLINICAL DECISION MAKIN[address] [place] [id number] AM-PAC 6 Clicks   Basic Mobility Inpatient Short Form  How much difficulty does the patient currently have. .. Unable A Lot A Little None   1. Turning over in bed (including adjusting bedclothes, sheets and blankets)? [ ] 1   [ ] 2   [X] 3   [ ] 4   2. Sitting down on and standing up from a chair with arms ( e.g., wheelchair, bedside commode, etc.)   [ ] 1   [ ] 2   [X] 3   [ ] 4   3. Moving from lying on back to sitting on the side of the bed? [ ] 1   [ ] 2   [X] 3   [ ] 4   How much help from another person does the patient currently need. .. Total A Lot A Little None   4. Moving to and from a bed to a chair (including a wheelchair)? [ ] 1   [ ] 2   [X] 3   [ ] 4   5. Need to walk in hospital room? [ ] 1   [ ] 2   [X] 3   [ ] 4   6. Climbing 3-5 steps with a railing? [X] 1   [ ] 2   [ ] 3   [ ] 4   © 2007, Trustees of [address] [place] [address] [address] [id number], under license to [employer]. All rights reserved       Score:  Initial: 16 Most Recent: X (Date: -- )     Interpretation of Tool:  Represents activities that are increasingly more difficult (i.e. Bed mobility, Transfers, Gait). Score 24 23 22-20 19-15 14-10 9-7 6       Modifier CH CI CJ CK CL CM CN         · Mobility - Walking and Moving Around:               - CURRENT STATUS:    CK - 40%-59% impaired, limited or restricted               - GOAL STATUS:           CJ - 20%-39% impaired, limited or restricted               - D/C STATUS:                       ---------------To be determined---------------  Payor: Audrey Mccray / Plan:  [facility] / Product Type: HMO /       Medical Necessity:     · Patient is expected to demonstrate progress in strength, range of motion, balance, coordination and functional technique to decrease assistance required with bed mobility, transfers & gait.   Reason for Services/Other Comments:  · Patient continues to require skilled intervention due to pt not independent with functional mobility. Use of outcome tool(s) and clinical judgement create a POC that gives a: Clear prediction of patient's progress: LOW COMPLEXITY                 TREATMENT:   (In addition to Assessment/Re-Assessment sessions the following treatments were rendered)      Pre-treatment Symptoms/Complaints:  none  Pain: Initial: visual scale  Pain Intensity 1: 5 (7 after therapy)  Post Session:  7/10      Gait Training (15 Minutes):  Gait training to improve and/or restore physical functioning as related to mobility and strength. Ambulated 20 Feet (ft) with Minimal assistance using a Walker, rolling and minimal Safety awareness training;Verbal cues related to their stance phase and stride length to promote proper body alignment and promote proper body posture. Instruction in performance of walker use and gait sequencing to correct stance phase and stride length. Therapeutic Exercise: (15 Minutes):  Exercises per grid below to improve mobility and strength. Required minimal verbal cues to promote proper body alignment and promote proper body posture. Progressed repetitions as indicated.             Date:   5/18/17 Date:    Date:      ACTIVITY/EXERCISE AM PM AM PM AM PM   GROUP THERAPY  [ ]  [ ]  [ ]  [ ]  [ ]  [ ]   Ankle Pumps 10              Quad Sets  10             Gluteal Sets  10             Hip ABd/ADduction  10             Straight Leg Raises  10             Knee Slides  10             Short Arc Quads               Long Arc Quads               Chair Slides                               B = bilateral; AA = active assistive; A = active; P = passive       Treatment/Session Assessment:         Response to Treatment:  Tolerated very well     Education:  [ ] Home Exercises  [X] Fall Precautions  [ ] Hip Precautions [ ] Going Home Video  [ ] Knee/Hip Prosthesis Review  [X] Walker Management/Safety [ ] Adaptive Equipment as Needed         Interdisciplinary Collaboration:   · Registered Nurse     After treatment position/precautions:   · Up in chair, Bed/Chair-wheels locked, Call light within reach and RN notified     Compliance with Program/Exercises: compliant all the time. Recommendations/Intent for next treatment session:  Treatment next visit will focus on increasing Mr. Stevensons independence with bed mobility, transfers, gait training, strength/ROM exercises, modalities for pain, and patient education.        Total Treatment Duration:  PT Patient Time In/Time Out  Time In: 0815  Time Out: [id number] [facility] PT

## 2017-05-18 NOTE — PROGRESS NOTES
Patient in recliner. Prineo dressing dry and intact. Dilaudid 2 mg given PO for pain of 8/10. Neurovascular status WDL. Palpable pulses. Positive dorsiflexion and plantar flexion. Instructed not to get up by themselves and call for assistance or any needs. Patient verbalized understanding. Call bell within reach. Side rails up x3. Bed low and locked. Recliner wheels locked. No distress noted.

## 2017-05-18 NOTE — PROGRESS NOTES
May 18, 2017         Post Op day: 1 Day Post-Op     Admit Date: 2017  Admit Diagnosis: Primary osteoarthritis of left knee [M17.12]        Subjective: Patient doing well. No concerns/complaints. No shortness of breath, chest pain or nausea/vomiting. Pain well controlled     Objective:   Visit Vitals    /67    Pulse 82    Temp 99.3 °F (37.4 °C)    Resp 16    Ht 6' (1.829 m)    Wt 108 kg (238 lb)    SpO2 97%    BMI 32.28 kg/m2    Temp (24hrs), Av.6 °F (36.4 °C), Min:95.1 °F (35.1 °C), Max:99.9 °F (37.7 °C)    Lab Results   Component Value Date/Time    HGB 11.1 2017 04:11 AM     Extremity Exam  Prieno intact. Incision benign (no erythema, induration)  +Tibialis Anterior and Gastroc-Soleus left lower extremity  Sensation intact to light touch  Extremity perfused  TEDS/SCDS  No sign of DVT     Assessment / Plan :  WBAT LLE  PT/OT  ASA along with SCDs/TEDS for DVT prophylaxis in house.  Home on ASA 81mg BID  DIspo-Home with HH  Patient Active Problem List   Diagnosis Code    Elevated serum creatinine R79.89    Decreased GFR R94.4    OA (osteoarthritis) of knee M17.10          Signed By: Feliciano Prado MD

## 2017-05-18 NOTE — PROGRESS NOTES
Problem: Mobility Impaired (Adult and Pediatric)  Goal: *Acute Goals and Plan of Care (Insert Text)  GOALS (1-4 days):  (1.)Mr. Nataly Chavez will move from supine to sit and sit to supine in bed with SUPERVISION. (2.)Mr. Nataly Chavez will transfer from bed to chair and chair to bed with STAND BY ASSIST using the least restrictive device. (3.)Mr. Nataly Chavez will ambulate with STAND BY ASSIST for 200 feet with the least restrictive device. (4.)Mr. Nataly Chavez will ambulate up/down 3 steps with left railing with CONTACT GUARD ASSIST with no device. (5.)Mr. Nataly Chavez will increase left knee ROM to 5°-80°.  ________________________________________________________________________________________________      PHYSICAL THERAPY JOINT CAMP TKA: Daily Note, Treatment Day: 1st and PM 5/18/2017  INPATIENT: Hospital Day: 2  Payor: Magaly Alcaraz / Plan: "[employer] HMO / Product Type: HMO /      NAME/AGE/GENDER: James Bustamante is a 73 y.o. male             PRIMARY DIAGNOSIS:  Primary osteoarthritis of left knee [M17.12]              Procedure(s) and Anesthesia Type:     * LEFT KNEE ARTHROPLASTY TOTAL - Spinal (Left)  ICD-10: Treatment Diagnosis:        · Pain in Left Knee (M25.562)  · Stiffness of Left Knee, Not elsewhere classified (M25.662)  · Difficulty in walking, Not elsewhere classified (R26.2)       ASSESSMENT:      Mr. Nataly Chavez up in chair on contact. Worked on some short distance gait training. Pt having some difficulty with gait this afternoon. In gym worked on TKA exercises. His color is a little better this afternoon but he is still diaphoretic and feeling a little woozy. Wanted to get back in bed in room, had a pretty good loss of balance going back to bed-recovered with minimal to moderate assist of therapist. Needs in reach and family present. Hope to work on gait in the morning. This section established at most recent assessment   PROBLEM LIST (Impairments causing functional limitations):  1. Decreased Strength  2.  Decreased ADL/Functional Activities  3. Decreased Transfer Abilities  4. Decreased Ambulation Ability/Technique  5. Decreased Balance  6. Increased Pain  7. Decreased Activity Tolerance  8. Decreased Flexibility/Joint Mobility  9. Decreased [place] with Home Exercise Program    INTERVENTIONS PLANNED: (Benefits and precautions of physical therapy have been discussed with the patient.)  1. Bed Mobility  2. Gait Training  3. Home Exercise Program (HEP)  4. Therapeutic Exercise/Strengthening  5. Transfer Training  6. Range of Motion: active/assisted/passive  7. Therapeutic Activities  8. Group Therapy      TREATMENT PLAN: Frequency/Duration: Follow patient BID   to address above goals. Rehabilitation Potential For Stated Goals: GOOD      RECOMMENDED REHABILITATION/EQUIPMENT: (at time of discharge pending progress): Continue Skilled Therapy and Home Health: Physical Therapy. HISTORY:   History of Present Injury/Illness (Reason for Referral):  HPI:  The patient has end stage arthritis of the left knee. The patient was evaluated and examined during a consultation prior to today. The patient complains of knee pain with activities, reports pain as mostly occurring along the joint lines, reports stiffness of the knee with prolonged inactivity, and swelling/pain at the end of the day and after increased physical activity. The pain affects the patients activities of daily living and quality of life. The patient has attempted and exhausted conservative treatment. There have been no changes to the patient's orthopedic condition since the last office visit. Past Medical History/Comorbidities:   Mr. Humberto Pinon  has a past medical history of Benign prostatic hyperplasia; CAD (coronary artery disease); Depression; Diastolic dysfunction; Elevated serum creatinine (04/24/2017); Former smoker; H/O echocardiogram (07/24/2014); Hypercholesteremia; Hypertension; Osteoarthritis; and Overactive bladder.  He also has no past medical history of Adverse effect of anesthesia; Difficult intubation; Malignant hyperthermia due to anesthesia; Nausea & vomiting; or Pseudocholinesterase deficiency. Mr. Keo Ashley  has a past surgical history that includes urological (2013) and colonoscopy. Social History/Living Environment:   Home Environment: Private residence  # Steps to Enter: 3  Rails to Enter: Yes  Hand Rails : Right  One/Two Story Residence: One story  Living Alone: No  Support Systems: Spouse/Significant Other/Partner  Patient Expects to be Discharged to[de-identified] Private residence  Current DME Used/Available at Home: Commode, bedside  Tub or Shower Type: Shower  Prior Level of Function/Work/Activity:  Pt was independent without an assistive device prior to this admission   Number of Personal Factors/Comorbidities that affect the Plan of Care: 0: LOW COMPLEXITY   EXAMINATION:   Most Recent Physical Functioning:                               Bed Mobility  Supine to Sit:  (up in chair on contact)  Sit to Supine: Minimum assistance     Transfers  Sit to Stand: Minimum assistance; Additional time  Stand to Sit: Minimum assistance; Additional time     Balance  Sitting: Intact  Standing: Pull to stand; With support          Gait Training: Yes     Weight Bearing Status  Left Side Weight Bearing: As tolerated  Distance (ft): 50 Feet (ft)  Ambulation - Level of Assistance: Minimal assistance  Assistive Device: Walker, rolling  Speed/Aide: Pace decreased (<100 feet/min); Shuffled  Step Length: Right shortened  Stance: Left decreased  Gait Abnormalities: Antalgic;Decreased step clearance; Step to gait  Interventions: Safety awareness training;Verbal cues      Braces/Orthotics: none     Left Knee Cold  Type: Cryocuff       Body Structures Involved:  1. Joints  2. Muscles Body Functions Affected:  1. Movement Related Activities and Participation Affected:  1.  Mobility   Number of elements that affect the Plan of Care: 4+: HIGH COMPLEXITY   CLINICAL PRESENTATION: Presentation: Stable and uncomplicated: LOW COMPLEXITY   CLINICAL DECISION MAKIN[address] [place] [address] AM-PAC 6 Clicks   Basic Mobility Inpatient Short Form  How much difficulty does the patient currently have. .. Unable A Lot A Little None   1. Turning over in bed (including adjusting bedclothes, sheets and blankets)? [ ] 1   [ ] 2   [X] 3   [ ] 4   2. Sitting down on and standing up from a chair with arms ( e.g., wheelchair, bedside commode, etc.)   [ ] 1   [ ] 2   [X] 3   [ ] 4   3. Moving from lying on back to sitting on the side of the bed? [ ] 1   [ ] 2   [X] 3   [ ] 4   How much help from another person does the patient currently need. .. Total A Lot A Little None   4. Moving to and from a bed to a chair (including a wheelchair)? [ ] 1   [ ] 2   [X] 3   [ ] 4   5. Need to walk in hospital room? [ ] 1   [ ] 2   [X] 3   [ ] 4   6. Climbing 3-5 steps with a railing? [X] 1   [ ] 2   [ ] 3   [ ] 4   © , Trustees of [address] [place] [address], under license to [employer]. All rights reserved       Score:  Initial: 16 Most Recent: X (Date: -- )     Interpretation of Tool:  Represents activities that are increasingly more difficult (i.e. Bed mobility, Transfers, Gait). Score 24 23 22-20 19-15 14-10 9-7 6       Modifier CH CI CJ CK CL CM CN         · Mobility - Walking and Moving Around:               - CURRENT STATUS:    CK - 40%-59% impaired, limited or restricted               - GOAL STATUS:           CJ - 20%-39% impaired, limited or restricted               - D/C STATUS:                       ---------------To be determined---------------  Payor: [place] / Plan:  [employer]  / Product Type: HMO /       Medical Necessity:     · Patient is expected to demonstrate progress in strength, range of motion, balance, coordination and functional technique to decrease assistance required with bed mobility, transfers & gait.   Reason for Services/Other Comments:  · Patient continues to require skilled intervention due to pt not independent with functional mobility. Use of outcome tool(s) and clinical judgement create a POC that gives a: Clear prediction of patient's progress: LOW COMPLEXITY                 TREATMENT:   (In addition to Assessment/Re-Assessment sessions the following treatments were rendered)      Pre-treatment Symptoms/Complaints:  none  Pain: Initial: visual scale  Pain Intensity 1: not reporting any pain  Post Session:  4/10      Gait Training (15 Minutes):  Gait training to improve and/or restore physical functioning as related to mobility and strength. Ambulated 50 Feet (ft) with Minimal assistance using a Walker, rolling and minimal Safety awareness training;Verbal cues related to their stance phase and stride length to promote proper body alignment and promote proper body posture. Instruction in performance of walker use and gait sequencing to correct stance phase and stride length. Therapeutic Exercise: (30 Minutes (group)):  Exercises per grid below to improve mobility and strength. Required minimal verbal cues to promote proper body alignment and promote proper body posture. Progressed repetitions as indicated.             Date:   5/18/17 Date:    Date:      ACTIVITY/EXERCISE AM PM AM PM AM PM   GROUP THERAPY  [ ]  [ ]  [ ]  [ ]  [ ]  [ ]   Ankle Pumps 10  15            Quad Sets  10  15           Gluteal Sets  10  15           Hip ABd/ADduction  10  15           Straight Leg Raises  10  15           Knee Slides  10  15           Short Arc Quads    15           Long Arc Quads               Chair Slides    15                           B = bilateral; AA = active assistive; A = active; P = passive       Treatment/Session Assessment:         Response to Treatment:  Tolerated very well     Education:  [ ] Home Exercises  [X] Fall Precautions  [ ] Hip Precautions [ ] Going Home Video  [ ] Knee/Hip Prosthesis Review  [X] [employer] Management/Safety [ ] Adaptive Equipment as Needed         Interdisciplinary Collaboration:   · Registered Nurse     After treatment position/precautions:   · Supine in bed, Bed/Chair-wheels locked, Bed in low position, Call light within reach and Family at bedside     Compliance with Program/Exercises: compliant all the time. Recommendations/Intent for next treatment session:  Treatment next visit will focus on increasing Mr. Stevensons independence with bed mobility, transfers, gait training, strength/ROM exercises, modalities for pain, and patient education.        Total Treatment Duration:  PT Patient Time In/Time Out  Time In: 1300  Time Out: 6020 [facility],

## 2017-05-18 NOTE — PROGRESS NOTES
05/17/17 2212   Oxygen Therapy   O2 Sat (%) 93 %   Pulse via Oximetry 80 beats per minute   O2 Device Room air    Continuous sat monitor ordered QHS. Pt set up on Monitor #7. Previous data deleted. Alarms set per protocol. Pt on room air. NAD. Called Monitor room, spoke with Lang Closs.

## 2017-05-18 NOTE — PROGRESS NOTES
05/18/17 1206   Oxygen Therapy   O2 Sat (%) 95 %   Pulse via Oximetry 87 beats per minute   O2 Device Room air    Patient encouraged to do IS every hour while awake-patient agreed and demonstrated. No shortness of breath or distress noted. BS are clear b/l.

## 2017-05-19 VITALS
OXYGEN SATURATION: 95 % | DIASTOLIC BLOOD PRESSURE: 56 MMHG | HEART RATE: 80 BPM | BODY MASS INDEX: 32.23 KG/M2 | HEIGHT: 72 IN | WEIGHT: 238 LBS | RESPIRATION RATE: 20 BRPM | SYSTOLIC BLOOD PRESSURE: 113 MMHG | TEMPERATURE: 97.2 F

## 2017-05-19 LAB
GLUCOSE BLD STRIP.AUTO-MCNC: 130 MG/DL (ref 65–100)
HGB BLD-MCNC: 9.8 G/DL (ref 13.6–17.2)
MM INDURATION POC: 0 MM (ref 0–5)
PPD POC: 0 NEGATIVE

## 2017-05-19 PROCEDURE — 85018 HEMOGLOBIN: CPT | Performed by: ORTHOPAEDIC SURGERY

## 2017-05-19 PROCEDURE — 97116 GAIT TRAINING THERAPY: CPT

## 2017-05-19 PROCEDURE — 36415 COLL VENOUS BLD VENIPUNCTURE: CPT | Performed by: ORTHOPAEDIC SURGERY

## 2017-05-19 PROCEDURE — 82962 GLUCOSE BLOOD TEST: CPT

## 2017-05-19 PROCEDURE — 94760 N-INVAS EAR/PLS OXIMETRY 1: CPT

## 2017-05-19 PROCEDURE — 97535 SELF CARE MNGMENT TRAINING: CPT

## 2017-05-19 PROCEDURE — 74011250637 HC RX REV CODE- 250/637: Performed by: ORTHOPAEDIC SURGERY

## 2017-05-19 PROCEDURE — 97150 GROUP THERAPEUTIC PROCEDURES: CPT

## 2017-05-19 PROCEDURE — 94762 N-INVAS EAR/PLS OXIMTRY CONT: CPT

## 2017-05-19 RX ORDER — OXYCODONE HYDROCHLORIDE 10 MG/1
10 TABLET ORAL
Qty: 90 TAB | Refills: 0 | Status: SHIPPED | OUTPATIENT
Start: 2017-05-19

## 2017-05-19 RX ADMIN — FLUOXETINE 20 MG: 20 CAPSULE ORAL at 08:38

## 2017-05-19 RX ADMIN — GABAPENTIN 100 MG: 100 CAPSULE ORAL at 08:37

## 2017-05-19 RX ADMIN — FENOFIBRATE 160 MG: 160 TABLET ORAL at 08:38

## 2017-05-19 RX ADMIN — ASPIRIN 81 MG CHEWABLE TABLET 81 MG: 81 TABLET CHEWABLE at 08:36

## 2017-05-19 RX ADMIN — METOPROLOL TARTRATE 25 MG: 25 TABLET ORAL at 08:37

## 2017-05-19 RX ADMIN — OXYCODONE HYDROCHLORIDE 10 MG: 5 TABLET ORAL at 11:47

## 2017-05-19 RX ADMIN — ACETAMINOPHEN 1000 MG: 500 TABLET, FILM COATED ORAL at 05:42

## 2017-05-19 RX ADMIN — TRANEXAMIC ACID 650 MG: 650 TABLET ORAL at 08:36

## 2017-05-19 RX ADMIN — CYCLOBENZAPRINE HYDROCHLORIDE 5 MG: 10 TABLET, FILM COATED ORAL at 08:37

## 2017-05-19 RX ADMIN — OXYCODONE HYDROCHLORIDE 10 MG: 5 TABLET ORAL at 05:42

## 2017-05-19 RX ADMIN — OXYCODONE HYDROCHLORIDE 10 MG: 10 TABLET, FILM COATED, EXTENDED RELEASE ORAL at 08:37

## 2017-05-19 RX ADMIN — EZETIMIBE 10 MG: 10 TABLET ORAL at 08:37

## 2017-05-19 RX ADMIN — OXYCODONE HYDROCHLORIDE 10 MG: 5 TABLET ORAL at 08:38

## 2017-05-19 RX ADMIN — SENNOSIDES AND DOCUSATE SODIUM 2 TABLET: 8.6; 5 TABLET ORAL at 08:36

## 2017-05-19 RX ADMIN — OLOPATADINE HYDROCHLORIDE 2 SQUIRT: 665 SPRAY NASAL at 09:00

## 2017-05-19 RX ADMIN — ISOSORBIDE MONONITRATE 30 MG: 30 TABLET, EXTENDED RELEASE ORAL at 08:37

## 2017-05-19 NOTE — PROGRESS NOTES
I have reviewed discharge instructions with the patient and spouse. The patient and spouse verbalized understanding. Signed discharge paper in paper chart. Pain and other medication prescriptions given to patient. Will discharge home with family.

## 2017-05-19 NOTE — PROGRESS NOTES
Problem: Mobility Impaired (Adult and Pediatric)  Goal: *Acute Goals and Plan of Care (Insert Text)  GOALS (1-4 days):  (1.)Mr. Nguyen Childs will move from supine to sit and sit to supine in bed with SUPERVISION. (2.)Mr. Nguyen Childs will transfer from bed to chair and chair to bed with STAND BY ASSIST using the least restrictive device. (3.)Mr. Nguyen Childs will ambulate with STAND BY ASSIST for 200 feet with the least restrictive device. (4.)Mr. Nguyen Childs will ambulate up/down 3 steps with left railing with CONTACT GUARD ASSIST with no device. (5.)Mr. Nguyen Childs will increase left knee ROM to 5°-80°.  ________________________________________________________________________________________________      PHYSICAL THERAPY JOINT CAMP TKA: Daily Note, Treatment Day: 2nd and AM 5/19/2017  INPATIENT: Hospital Day: 3  Payor: Cheli Dennis / Plan: [employer] HMO / Product Type: HMO /      NAME/AGE/GENDER: Arabella Cai is a 73 y.o. male             PRIMARY DIAGNOSIS:  Primary osteoarthritis of left knee [M17.12]              Procedure(s) and Anesthesia Type:     * LEFT KNEE ARTHROPLASTY TOTAL - Spinal (Left)  ICD-10: Treatment Diagnosis:        · Pain in Left Knee (M25.562)  · Stiffness of Left Knee, Not elsewhere classified (M25.662)  · Difficulty in walking, Not elsewhere classified (R26.2)       ASSESSMENT:      Mr. Nguyen Childs up in chair on contact and agreeable to therapy. Worked on gait training and making great progress from yesterday. Stair training in the gym with verbal cues and pt did well and feels good about getting in his home. In gym worked on TKA exercises with verbal cues. Will need some continued feedback from home health about his quad activation. Nice progress with repetitions. Back in room ice in place and pt plans to go home soon. This section established at most recent assessment   PROBLEM LIST (Impairments causing functional limitations):  1. Decreased Strength  2. Decreased ADL/Functional Activities  3.  Decreased Transfer Abilities  4. Decreased Ambulation Ability/Technique  5. Decreased Balance  6. Increased Pain  7. Decreased Activity Tolerance  8. Decreased Flexibility/Joint Mobility  9. Decreased [place] with Home Exercise Program    INTERVENTIONS PLANNED: (Benefits and precautions of physical therapy have been discussed with the patient.)  1. Bed Mobility  2. Gait Training  3. Home Exercise Program (HEP)  4. Therapeutic Exercise/Strengthening  5. Transfer Training  6. Range of Motion: active/assisted/passive  7. Therapeutic Activities  8. Group Therapy      TREATMENT PLAN: Frequency/Duration: Follow patient BID   to address above goals. Rehabilitation Potential For Stated Goals: GOOD      RECOMMENDED REHABILITATION/EQUIPMENT: (at time of discharge pending progress): Continue Skilled Therapy and Home Health: Physical Therapy. HISTORY:   History of Present Injury/Illness (Reason for Referral):  HPI:  The patient has end stage arthritis of the left knee. The patient was evaluated and examined during a consultation prior to today. The patient complains of knee pain with activities, reports pain as mostly occurring along the joint lines, reports stiffness of the knee with prolonged inactivity, and swelling/pain at the end of the day and after increased physical activity. The pain affects the patients activities of daily living and quality of life. The patient has attempted and exhausted conservative treatment. There have been no changes to the patient's orthopedic condition since the last office visit. Past Medical History/Comorbidities:   Mr. Sanjay Pradhan  has a past medical history of Benign prostatic hyperplasia; CAD (coronary artery disease); Depression; Diastolic dysfunction; Elevated serum creatinine (04/24/2017); Former smoker; H/O echocardiogram (07/24/2014); Hypercholesteremia; Hypertension; Osteoarthritis; and Overactive bladder.  He also has no past medical history of Adverse effect of anesthesia; Difficult intubation; Malignant hyperthermia due to anesthesia; Nausea & vomiting; or Pseudocholinesterase deficiency. Mr. Yola Kinsey  has a past surgical history that includes urological (2013) and colonoscopy. Social History/Living Environment:   Home Environment: Private residence  # Steps to Enter: 3  Rails to Enter: Yes  Hand Rails : Right  One/Two Story Residence: One story  Living Alone: No  Support Systems: Spouse/Significant Other/Partner  Patient Expects to be Discharged to[de-identified] Private residence  Current DME Used/Available at Home: Commode, bedside  Tub or Shower Type: Shower  Prior Level of Function/Work/Activity:  Pt was independent without an assistive device prior to this admission   Number of Personal Factors/Comorbidities that affect the Plan of Care: 0: LOW COMPLEXITY   EXAMINATION:   Most Recent Physical Functioning:                   LLE PROM  L Knee Flexion: 90  L Knee Extension: 8           Bed Mobility  Supine to Sit: Contact guard assistance     Transfers  Sit to Stand: Stand-by asssistance  Stand to Sit: Stand-by asssistance  Bed to Chair: Contact guard assistance     Balance  Sitting: Intact  Standing: Intact; With support          Gait Training: Yes     Weight Bearing Status  Left Side Weight Bearing: As tolerated  Distance (ft): 390 Feet (ft) (390)  Ambulation - Level of Assistance: Stand-by asssistance  Assistive Device: Walker, rolling  Speed/Aide: Pace decreased (<100 feet/min)  Step Length: Right shortened  Stance: Left decreased  Gait Abnormalities: Antalgic;Decreased step clearance  Number of Stairs Trained: 3  Stairs - Level of Assistance: Contact guard assistance  Rail Use: Right   Interventions: Safety awareness training;Verbal cues      Braces/Orthotics: none     Left Knee Cold  Type: Cryocuff       Body Structures Involved:  1. Joints  2. Muscles Body Functions Affected:  1. Movement Related Activities and Participation Affected:  1.  Mobility   Number of elements that affect the Plan of Care: 4+: HIGH COMPLEXITY   CLINICAL PRESENTATION:   Presentation: Stable and uncomplicated: LOW COMPLEXITY   CLINICAL DECISION MAKIN \[facility] [place] [facility]\"" [address] [address] AM-PAC 6 Clicks   Basic Mobility Inpatient Short Form  How much difficulty does the patient currently have. .. Unable A Lot A Little None   1. Turning over in bed (including adjusting bedclothes, sheets and blankets)? [ ] 1   [ ] 2   [X] 3   [ ] 4   2. Sitting down on and standing up from a chair with arms ( e.g., wheelchair, bedside commode, etc.)   [ ] 1   [ ] 2   [X] 3   [ ] 4   3. Moving from lying on back to sitting on the side of the bed? [ ] 1   [ ] 2   [X] 3   [ ] 4   How much help from another person does the patient currently need. .. Total A Lot A Little None   4. Moving to and from a bed to a chair (including a wheelchair)? [ ] 1   [ ] 2   [X] 3   [ ] 4   5. Need to walk in hospital room? [ ] 1   [ ] 2   [X] 3   [ ] 4   6. Climbing 3-5 steps with a railing? [X] 1   [ ] 2   [ ] 3   [ ] 4   © , Trustees of [address] \[facility]\"" [address] [address], under license to [employer]. All rights reserved       Score:  Initial: 16 Most Recent: X (Date: -- )     Interpretation of Tool:  Represents activities that are increasingly more difficult (i.e. Bed mobility, Transfers, Gait).        Score 24 23 22-20 19-15 14-10 9-7 6       Modifier CH CI CJ CK CL CM CN         · Mobility - Walking and Moving Around:               - CURRENT STATUS:    CK - 40%-59% impaired, limited or restricted               - GOAL STATUS:           CJ - 20%-39% impaired, limited or restricted               - D/C STATUS:                       ---------------To be determined---------------  Payor: Khadra Kim / Plan:  [employer]  / Product Type: HMO /       Medical Necessity:     · Patient is expected to demonstrate progress in strength, range of motion, balance, coordination and functional technique to decrease assistance required with bed mobility, transfers & gait. Reason for Services/Other Comments:  · Patient continues to require skilled intervention due to pt not independent with functional mobility. Use of outcome tool(s) and clinical judgement create a POC that gives a: Clear prediction of patient's progress: LOW COMPLEXITY                 TREATMENT:   (In addition to Assessment/Re-Assessment sessions the following treatments were rendered)      Pre-treatment Symptoms/Complaints:  none  Pain: Initial: visual scale  Pain Intensity 1: reporting stiffness Post Session:  4/10      Gait Training (15 Minutes):  Gait training to improve and/or restore physical functioning as related to mobility and strength. Ambulated 390 Feet (ft) (390) with Stand-by asssistance using a Walker, rolling and minimal Safety awareness training;Verbal cues related to their stance phase and stride length to promote proper body alignment and promote proper body posture. Instruction in performance of walker use and gait sequencing to correct stance phase and stride length. Therapeutic Exercise: (45 Minutes (group)):  Exercises per grid below to improve mobility and strength. Required minimal verbal cues to promote proper body alignment and promote proper body posture. Progressed repetitions as indicated.             Date:   5/18/17 Date:   5/19/17 Date:      ACTIVITY/EXERCISE AM PM AM PM AM PM   GROUP THERAPY  [ ]  [ ]  [ Arlys Shore  [ ]  [ ]  [ ]   Ankle Pumps 10  15   20         Quad Sets  10  15  20         Gluteal Sets  10  15  20         Hip ABd/ADduction  10  15  20         Straight Leg Raises  10  15  20         Knee Slides  10  15  20         Short Arc Quads    15  20         Long Arc Quads               Chair Slides    15  20                         B = bilateral; AA = active assistive; A = active; P = passive       Treatment/Session Assessment:         Response to Treatment:  Tolerated very well     Education:  [ ] Home Exercises  [X] Fall Precautions  [ ] Hip Precautions [ ] Going Home Video  [ ] Knee/Hip Prosthesis Review  [X] Walker Management/Safety [ ] Adaptive Equipment as Needed         Interdisciplinary Collaboration:   · Registered Nurse and Rehabilitation Attendant     After treatment position/precautions:   · Up in chair, Bed/Chair-wheels locked, Call light within reach and Family at bedside     Compliance with Program/Exercises: compliant all the time. Recommendations/Intent for next treatment session:  Treatment next visit will focus on increasing Mr. Jackson independence with bed mobility, transfers, gait training, strength/ROM exercises, modalities for pain, and patient education.        Total Treatment Duration:  PT Patient Time In/Time Out  Time In: 1030  Time Out: 900 Lora Peter PT

## 2017-05-19 NOTE — DISCHARGE INSTRUCTIONS
[facility]   Patient Discharge Instructions    Cher Lundberg / [id number] : 1950    Admitted 2017 Discharged: 2017     IF YOU HAVE ANY PROBLEMS ONCE YOU ARE AT HOME CALL THE FOLLOWING NUMBERS:   Main office number: [phone]    Take Home Medications     · It is important that you take the medication exactly as they are prescribed. · Keep your medication in the bottles provided by the pharmacist and keep a list of the medication names, dosages, and times to be taken in your wallet. · Do not take other medications without consulting your doctor. What to do at [address] your prehospital diet. If you have excessive nausea or vomitting call your doctor's office     Home Physical Therapy is arranged. Use rolling walker when walking. Use Cale Hose stockings until we see you in the office for your follow up appointment with Dr. Bebe Holt. Patients who have had a joint replacement should not drive until you are seen for your follow up appointment by Dr. Bebe Holt. When to Call    - Call if you have a temperature greater then 101  - Unable to keep food down  - Loose control of your bladder or bowel function  - Are unable to bear any weight   - Need a pain medication refill       DISCHARGE SUMMARY from Nurse    The following personal items collected during your admission are returned to you:   Dental Appliance: Dental Appliances: None  Vision: Visual Aid: Glasses  Hearing Aid:   na  Jewelry: Jewelry: None  Clothing: Clothing: At bedside  Other Valuables:  Other Valuables: Cell Phone, "[employer] 37 sent to safe:   na    PATIENT INSTRUCTIONS:    After general anesthesia or intravenous sedation, for 24 hours or while taking prescription Narcotics:  · Limit your activities  · Do not drive and operate hazardous machinery  · Do not make important personal or business decisions  · Do  not drink alcoholic beverages  · If you have not urinated within 8 hours after discharge, please contact your surgeon on call. Report the following to your surgeon:  · Excessive pain, swelling, redness or odor of or around the surgical area  · Temperature over 101  · Nausea and vomiting lasting longer than 4 hours or if unable to take medications  · Any signs of decreased circulation or nerve impairment to extremity: change in color, persistent  numbness, tingling, coldness or increase pain  · Any questions, call office @ [phone]      Keep scheduled follow up appointment. If need to change, call office @ [phone]. *  Please give a list of your current medications to your Primary Care Provider. *  Please update this list whenever your medications are discontinued, doses are      changed, or new medications (including over-the-counter products) are added. *  Please carry medication information at all times in case of emergency situations. Total Knee Replacement: What to Expect at [address] [place] [address]    When you leave the hospital, you should be able to move around with a walker or crutches. But you will need someone to help you at home for the next few weeks or until you have more energy and can move around better. If there is no one to help you at home, you may go to a rehabilitation center. You will go home with a bandage and stitches or staples. Change the bandage as your doctor tells you to. Your doctor will remove your stitches or staples 10 to 21 days after your surgery. You may still have some mild pain, and the area may be swollen for 3 to 6 months after surgery. Your knee will continue to improve for 6 to 12 months. You will probably use a walker for 1 to 3 weeks and then use crutches. When you are ready, you can use a cane. You will probably be able to walk on your own in 4 to 8 weeks. You will need to do months of physical rehabilitation (rehab) after a knee replacement. Rehab will help you strengthen the muscles of the knee and help you regain movement.  After you recover, your artificial knee will allow you to do normal daily activities with less pain or no pain at all. You may be able to hike, dance, ride a bike, and play golf. Talk to your doctor about whether you can do more strenuous activities. Always tell your caregivers that you have an artificial knee. How long it will take to walk on your own, return to normal activities, and go back to work depends on your health and how well your rehabilitation (rehab) program goes. The better you do with your rehab exercises, the quicker you will get your strength and movement back. This care sheet gives you a general idea about how long it will take for you to recover. But each person recovers at a different pace. Follow the steps below to get better as quickly as possible. How can you care for yourself at home? Activity  · Rest when you feel tired. You may take a nap, but do not stay in bed all day. When you sit, use a chair with arms. You can use the arms to help you stand up. · Work with your physical therapist to find the best way to exercise. You may be able to take frequent, short walks using crutches or a walker. What you can do as your knee heals will depend on whether your new knee is cemented or uncemented. You may not be able to do certain things for a while if your new knee is uncemented. · After your knee has healed enough, you can do more strenuous activities with caution. ¨ You can golf, but use a golf cart, and do not wear shoes with spikes. ¨ You can bike on a flat road or on a stationary bike. Avoid biking up hills. ¨ Your doctor may suggest that you stay away from activities that put stress on your knee. These include tennis or badminton, squash or racquetball, contact sports like football, jumping (such as in basketball), jogging, or running. ¨ Avoid activities where you might fall. These include horseback riding, skiing, and mountain biking. · Do not sit for more than 1 hour at a time.  Get up and walk around for a while before you sit again. If you must sit for a long time, prop up your leg with a chair or footstool. This will help you avoid swelling. · Ask your doctor when you can shower. You may need to take sponge baths until your stitches or staples have been removed. · Ask your doctor when you can drive again. It may take up to 8 weeks after knee replacement surgery before it is safe for you to drive. · When you get into a car, sit on the edge of the seat. Then pull in your legs, and turn to face the front. · You should be able to do many everyday activities 3 to 6 weeks after your surgery. You will probably need to take 4 to 16 weeks off from work. When you can go back to work depends on the type of work you do and how you feel. · Ask your doctor when it is okay for you to have sex. · Do not lift anything heavier than 10 pounds and do not lift weights for 12 weeks. Diet  · By the time you leave the hospital, you should be eating your normal diet. If your stomach is upset, try bland, low-fat foods like plain rice, broiled chicken, toast, and yogurt. Your doctor may suggest that you take iron and vitamin supplements. · Drink plenty of fluids (unless your doctor tells you not to). · Eat healthy foods, and watch your portion sizes. Try to stay at your ideal weight. Too much weight puts more stress on your new knee. · You may notice that your bowel movements are not regular right after your surgery. This is common. Try to avoid constipation and straining with bowel movements. You may want to take a fiber supplement every day. If you have not had a bowel movement after a couple of days, ask your doctor about taking a mild laxative. Medicines  · Your doctor will tell you if and when you can restart your medicines. He or she will also give you instructions about taking any new medicines. · If you take blood thinners, such as warfarin (Coumadin), clopidogrel (Plavix), or aspirin, be sure to talk to your doctor. He or she will tell you if and when to start taking those medicines again. Make sure that you understand exactly what your doctor wants you to do. · Your doctor may give you a blood-thinning medicine to prevent blood clots. If you take a blood thinner, be sure you get instructions about how to take your medicine safely. Blood thinners can cause serious bleeding problems. This medicine could be in pill form or as a shot (injection). If a shot is necessary, your doctor will tell you how to do this. · Be safe with medicines. Take pain medicines exactly as directed. ¨ If the doctor gave you a prescription medicine for pain, take it as prescribed. ¨ If you are not taking a prescription pain medicine, ask your doctor if you can take an over-the-counter medicine. ¨ Plan to take your pain medicine 30 minutes before exercises. It is easier to prevent pain before it starts than to stop it once it has started. · If you think your pain medicine is making you sick to your stomach:  ¨ Take your medicine after meals (unless your doctor has told you not to). ¨ Ask your doctor for a different pain medicine. · If your doctor prescribed antibiotics, take them as directed. Do not stop taking them just because you feel better. You need to take the full course of antibiotics. Incision care  · You will have a bandage over the cut (incision) and staples or stitches. Take the bandage off when your doctor says it is okay. · Your doctor will remove the staples or stitches 10 days to 3 weeks after the surgery and replace them with strips of tape. Leave the tape on for a week or until it falls off. Exercise  · Your rehab program will give you a number of exercises to do to help you get back your knee's range of motion and strength. Always do them as your therapist tells you. Ice and elevation  · For pain and swelling, put ice or a cold pack on the area for 10 to 20 minutes at a time.  Put a thin cloth between the ice and your skin.  Other instructions  · Continue to wear your support stockings as your doctor says. These help to prevent blood clots. The length of time that you will have to wear them depends on your activity level and the amount of swelling. · Wear medical alert jewelry that says you may need antibiotics before any procedure, including dental work. You can buy this at most drugstores. · You have metal pieces in your knee. These may set off some airport metal detectors. Carry a medical alert card that says you have an artificial joint, just in case. Follow-up care is a key part of your treatment and safety. Be sure to make and go to all appointments, and call your doctor if you are having problems. It's also a good idea to know your test results and keep a list of the medicines you take. When should you call for help? Call 911 anytime you think you may need emergency care. For example, call if:  · You passed out (lost consciousness). · You have severe trouble breathing. · You have sudden chest pain and shortness of breath, or you cough up blood. Call your doctor now or seek immediate medical care if:  · You have signs of infection, such as:  ¨ Increased pain, swelling, warmth, or redness. ¨ Red streaks leading from the incision. ¨ Pus draining from the incision. ¨ A fever. · You have signs of a blood clot, such as:  ¨ Pain in your calf, back of the knee, thigh, or groin. ¨ Redness and swelling in your leg or groin. · Your incision comes open and begins to bleed, or the bleeding increases. · You have pain that does not get better after you take pain medicine. Watch closely for changes in your health, and be sure to contact your doctor if:  · You do not have a bowel movement after taking a laxative. Where can you learn more? Go to http://danny-felecia.info/. Enter B457 in the search box to learn more about \"Total Knee Replacement: What to Expect at Home. \"  Current as of: August 4, 2016  Content Version: 11.2  © 1038-1018 [employer]. Care instructions adapted under license by [employer] (which disclaims liability or warranty for this information). If you have questions about a medical condition or this instruction, always ask your healthcare professional. [address] any warranty or liability for your use of this information. These are general instructions for a healthy lifestyle:    No smoking/ No tobacco products/ Avoid exposure to second hand smoke    Surgeon General's Warning:  Quitting smoking now greatly reduces serious risk to your health. Obesity, smoking, and sedentary lifestyle greatly increases your risk for illness    A healthy diet, regular physical exercise & weight monitoring are important for maintaining a healthy lifestyle    You may be retaining fluid if you have a history of heart failure or if you experience any of the following symptoms:  Weight gain of 3 pounds or more overnight or 5 pounds in a week, increased swelling in our hands or feet or shortness of breath while lying flat in bed. Please call your doctor as soon as you notice any of these symptoms; do not wait until your next office visit. Recognize signs and symptoms of STROKE:    F-face looks uneven    A-arms unable to move or move even    S-speech slurred or non-existent    T-time-call 911 as soon as signs and symptoms begin-DO NOT go       Back to bed or wait to see if you get better-TIME IS BRAIN. The discharge information has been reviewed with the patient. The patient verbalized understanding. Information obtained by :  I understand that if any problems occur once I am at home I am to contact my physician. I understand and acknowledge receipt of the instructions indicated above.                                                                                                                                            Physician's or R.N.'s Signature                                                                  Date/Time                                                                                                                                              Patient or Representative Signature                                                          Date/Time

## 2017-05-19 NOTE — PROGRESS NOTES
Patient is A&Ox4. Able to verbalize needs. Resting quietly with no distress noted. Shift assessment completed. Dressing to surgical site is dry and intact. Neurovascular and peripheral vascular checks WNL. Voiding clear yellow urine. Ambulates with walker x1 assist. PIV capped and flushed without difficulty. Dressing to PIV intact with no S/S of infection. Pt refused iceman. Denies needs. Bed low and locked. Call light within reach. Instructed to call for assistance. Patient verbalizes understanding. Will continue to monitor.

## 2017-05-19 NOTE — PROGRESS NOTES
Problem: Self Care Deficits Care Plan (Adult)  Goal: *Acute Goals and Plan of Care (Insert Text)  GOALS:   DISCHARGE GOALS (in preparation for going home/rehab): 3 days  1. Mr. Humberto Pinon will perform one lower body dressing activity with minimal assistance required to demonstrate improved functional mobility and safety. GOAL MET 5/19/2017    2. Mr. Humberto Pinon will perform one lower body bathing activity with minimal assistance required to demonstrate improved functional mobility and safety. GOAL MET 5/19/2017    3. Mr. Humberto Pinon will perform toileting/toilet transfer with contact guard assistance to demonstrate improved functional mobility and safety.gm  . 4. Mr. Humberto Pinon will perform shower transfer with contact guard assistance to demonstrate improved functional mobility and safety. GOAL MET 5/19/2017        JOINT CAMP OCCUPATIONAL THERAPY TKA: Daily Note and AM 5/19/2017  INPATIENT: Hospital Day: 3  Payor: Corine Escalera / Plan: BR[employer]D - [employer]. HMO / Product Type: HMO /      NAME/AGE/GENDER: Soniya Livingston is a 73 y.o. male             PRIMARY DIAGNOSIS:  Primary osteoarthritis of left knee [M17.12]              Procedure(s) and Anesthesia Type:     * LEFT KNEE ARTHROPLASTY TOTAL - Spinal (Left)  ICD-10: Treatment Diagnosis:        · Pain in Left Knee (M25.562)  · Stiffness of Left Knee, Not elsewhere classified (N03.453)  · Other lack of cordination (R27.8)       ASSESSMENT:      Mr. Humberto Pinon is s/p left TKA and presents with decreased weight bearing on left LE and decreased independence with functional mobility and activities of daily living. Patient completed shower and dressing as charter below in ADL grid and is ambulating with rolling walker and CGA assist.  Patient has met 4/4 goals and plans to return home with good family support. Family able to provide patient with appropriate level of assistance at this time. OT reviewed safety precautions throughout session and therapy schedule for the remainder of today.   Patient instructed to call for assistance when needing to get up from recliner and all needs in reach. Patient verbalized understanding of call light. This section established at most recent assessment   PROBLEM LIST (Impairments causing functional limitations):  1. Decreased Strength  2. Decreased ADL/Functional Activities  3. Decreased Transfer Abilities  4. Increased Pain  5. Increased Fatigue  6. Decreased Flexibility/Joint Mobility  7. Decreased Knowledge of Precautions    INTERVENTIONS PLANNED: (Benefits and precautions of occupational therapy have been discussed with the patient.)  1. Activities of daily living training  2. Adaptive equipment training  3. Balance training  4. Clothing management  5. Donning&doffing training  6. Theraputic activity      TREATMENT PLAN: Frequency/Duration: Follow patient 1 time to address above goals. Rehabilitation Potential For Stated Goals: GOOD      RECOMMENDED REHABILITATION/EQUIPMENT: (at time of discharge pending progress): Continue Skilled Therapy and Home Health: Physical Therapy. OCCUPATIONAL PROFILE AND HISTORY:   History of Present Injury/Illness (Reason for Referral): Pt presents this date s/p (left) TKA. Past Medical History/Comorbidities:   Mr. Geoff Duong  has a past medical history of Benign prostatic hyperplasia; CAD (coronary artery disease); Depression; Diastolic dysfunction; Elevated serum creatinine (04/24/2017); Former smoker; H/O echocardiogram (07/24/2014); Hypercholesteremia; Hypertension; Osteoarthritis; and Overactive bladder. He also has no past medical history of Adverse effect of anesthesia; Difficult intubation; Malignant hyperthermia due to anesthesia; Nausea & vomiting; or Pseudocholinesterase deficiency. Mr. Geoff Duong  has a past surgical history that includes urological (2013) and colonoscopy.   Social History/Living Environment:   Home Environment: Private residence  # Steps to Enter: 3  Rails to Enter: Yes  [employer] : Right  One/Two Story Residence: One story  Living Alone: No  Support Systems: Spouse/Significant Other/Partner  Patient Expects to be Discharged to[de-identified] Private residence  Current DME Used/Available at Home: Commode, bedside  Tub or Shower Type: Shower  Prior Level of Function/Work/Activity:  Mod I with ADLS and working      Number of Personal Factors/Comorbidities that affect the Plan of Care: Brief history (0):  LOW COMPLEXITY   ASSESSMENT OF OCCUPATIONAL PERFORMANCE[de-identified]   Most Recent Physical Functioning:            Patient Vitals for the past 6 hrs:   BP SpO2 O2 Flow Rate (L/min) Pulse   05/19/17 0540 137/74 99 % 2 l/min 84   05/19/17 0757 113/56 99 % - 80   05/19/17 0953 - 95 % 0 l/min -                                   Mental Status  Neurologic State: Alert; Appropriate for age  Orientation Level: Appropriate for age  Cognition: Appropriate decision making; Appropriate for age attention/concentration; Appropriate safety awareness; Follows commands  Perception: Appears intact  Perseveration: No perseveration noted  Safety/Judgement: Awareness of environment;Good awareness of safety precautions                    Basic ADLs (From Assessment) Complex ADLs (From Assessment)   Basic ADL  Feeding: Setup  Oral Facial Hygiene/Grooming: Supervision  Bathing: Moderate assistance  Upper Body Dressing: Supervision  Lower Body Dressing: Moderate assistance  Toileting:  Total assistance (catheter)     Grooming/Bathing/Dressing Activities of Daily Living   Grooming  Grooming Assistance: Stand-by assistance  Washing Face: Stand-by assistance  Washing Hands: Stand-by assistance Cognitive Retraining  Safety/Judgement: Awareness of environment;Good awareness of safety precautions   Upper Body Bathing  Bathing Assistance: Stand-by assistance  Position Performed: Standing     Lower Body Bathing  Bathing Assistance: Minimum assistance  Perineal  : Stand-by assistance  Position Performed: Standing  Lower Body : Minimum assistance  Position Performed: Seated in chair;Standing  Adaptive Equipment: Grab bar; Shower chair     Upper Body Dressing Assistance  Dressing Assistance: Supervision/set-up  Pullover Shirt: Supervision/set-up Functional Transfers  Bathroom Mobility: Contact guard assistance  Toilet Transfer : Contact guard assistance  Shower Transfer: Contact guard assistance   Lower Body Dressing Assistance  Dressing Assistance: Minimum assistance  Underpants: Minimum assistance  Pants With Button/Zipper: Minimum assistance  Antiembolitic Stockings: Moderate assistance (L le only)  Shoes with Velcro: Moderate assistance (L LE only) Bed/Mat Mobility  Supine to Sit: Contact guard assistance  Sit to Stand: Contact guard assistance  Bed to Chair: Contact guard assistance           Physical Skills Involved:  1. Range of Motion  2. Balance  3. Mobility Cognitive Skills Affected (resulting in the inability to perform in a timely and safe manner): 1. none Psychosocial Skills Affected:  1. none   Number of elements that affect the Plan of Care: 1-3:  LOW COMPLEXITY   CLINICAL DECISION MAKIN [facility] Inpatient Short Form  How much help from another person does the patient currently need. .. Total A Lot A Little None   1. Putting on and taking off regular lower body clothing?   [ ] 1   [X] 2   [ ] 3   [ ] 4   2. Bathing (including washing, rinsing, drying)? [ ] 1   [X] 2   [ ] 3   [ ] 4   3. Toileting, which includes using toilet, bedpan or urinal?   [X] 1   [ ] 2   [ ] 3   [ ] 4   4. Putting on and taking off regular upper body clothing?   [ ] 1   [ ] 2   [X] 3   [ ] 4   5. Taking care of personal grooming such as brushing teeth? [ ] 1   [ ] 2   [X] 3   [ ] 4   6. Eating meals? [ ] 1   [ ] 2   [ ] 3   [X] 4   © , Trustees of [address] [place] [address] [address] [address], under license to [employer].  All rights reserved   Score:  Initial: 15 Most Recent: X (Date: -- )     Interpretation of Tool:  Represents activities that are increasingly more difficult (i.e. Bed mobility, Transfers, Gait). Score 24 23 22-20 19-15 14-10 9-7 6       Modifier CH CI CJ CK CL CM CN         · Self Care:               - CURRENT STATUS:    CK - 40%-59% impaired, limited or restricted               - GOAL STATUS:           CJ - 20%-39% impaired, limited or restricted               - D/C STATUS:                       ---------------To be determined---------------  Payor: Kaalni Jones / Plan: NoDaysOff HMO / Product Type: HMO /       Medical Necessity:     · Patient is expected to demonstrate progress in balance, coordination and functional technique to decrease assistance required with self care and functional mobility and improve safety during self care and functional mobility. Reason for Services/Other Comments:  · Patient continues to require skilled intervention due to decreased self care and functional mobility. Use of outcome tool(s) and clinical judgement create a POC that gives a: LOW COMPLEXITY                 TREATMENT:   (In addition to Assessment/Re-Assessment sessions the following treatments were rendered)      Pre-treatment Symptoms/Complaints:  none  Pain: Initial:   Pain Intensity 1: 2  Pain Location 1: Knee  Pain Orientation 1: Left  Pain Intervention(s) 1: Shower  Post Session:  2/10 rest      Self Care: (30): Procedure(s) (per grid) utilized to improve and/or restore self-care/home management as related to dressing, bathing, toileting and grooming. Required minimal visual, verbal and tactile cueing to facilitate activities of daily living skills and compensatory activities. Treatment/Session Assessment:         Response to Treatment:  Tolerated ok, needed rest breaks. plans to go home today.      Education:  [ ] Home Exercises  [X] Fall Precautions  [ ] Hip Precautions [ ] Going Home Video  [X] Knee/Hip Prosthesis Review  [X] Walker Management/Safety [X] Adaptive Equipment as Needed Interdisciplinary Collaboration:   · Occupational Therapist and Registered Nurse     After treatment position/precautions:   · Up in chair, Bed alarm/tab alert on, Bed/Chair-wheels locked, Call light within reach and RN notified     Compliance with Program/Exercises: compliant all of the time. Recommendations/Intent for next treatment session:  Treatment next visit will focus on increasing Mr. Stevensons independence with  self care and functional mobility modalities for pain, and patient education.        Total Treatment Duration:30  OT Patient Time In/Time Out  Time In: 6522  Time Out: Matias Rm OT

## 2017-05-19 NOTE — PROGRESS NOTES
Patient in bed. Dressing dry and intact. Patient denies needs at present. Voided 200 mls of clear yellow urine. Neurovascular status WDL. Palpable pulses. Positive dorsiflexion and plantar flexion. Instructed not to get up by themselves and call for assistance or any needs. Patient verbalized understanding. Call bell within reach. Side rails up x3. Bed low and locked. No distress noted.

## 2017-05-20 ENCOUNTER — HOME CARE VISIT (OUTPATIENT)
Dept: SCHEDULING | Facility: HOME HEALTH | Age: 67
End: 2017-05-20
Payer: COMMERCIAL

## 2017-05-20 VITALS
RESPIRATION RATE: 16 BRPM | SYSTOLIC BLOOD PRESSURE: 136 MMHG | TEMPERATURE: 100.3 F | HEART RATE: 80 BPM | DIASTOLIC BLOOD PRESSURE: 80 MMHG

## 2017-05-20 PROCEDURE — G0151 HHCP-SERV OF PT,EA 15 MIN: HCPCS

## 2017-05-20 PROCEDURE — 400013 HH SOC

## 2017-05-23 ENCOUNTER — HOME CARE VISIT (OUTPATIENT)
Dept: SCHEDULING | Facility: HOME HEALTH | Age: 67
End: 2017-05-23
Payer: COMMERCIAL

## 2017-05-23 PROCEDURE — G0157 HHC PT ASSISTANT EA 15: HCPCS

## 2017-05-25 ENCOUNTER — HOME CARE VISIT (OUTPATIENT)
Dept: SCHEDULING | Facility: HOME HEALTH | Age: 67
End: 2017-05-25
Payer: COMMERCIAL

## 2017-05-25 VITALS
RESPIRATION RATE: 16 BRPM | SYSTOLIC BLOOD PRESSURE: 135 MMHG | DIASTOLIC BLOOD PRESSURE: 87 MMHG | HEART RATE: 78 BPM | TEMPERATURE: 97.4 F

## 2017-05-25 PROCEDURE — G0157 HHC PT ASSISTANT EA 15: HCPCS

## 2017-05-26 ENCOUNTER — HOME CARE VISIT (OUTPATIENT)
Dept: SCHEDULING | Facility: HOME HEALTH | Age: 67
End: 2017-05-26
Payer: COMMERCIAL

## 2017-05-26 PROCEDURE — G0157 HHC PT ASSISTANT EA 15: HCPCS

## 2017-05-27 VITALS
RESPIRATION RATE: 18 BRPM | HEART RATE: 78 BPM | TEMPERATURE: 78 F | DIASTOLIC BLOOD PRESSURE: 79 MMHG | SYSTOLIC BLOOD PRESSURE: 128 MMHG

## 2017-05-29 ENCOUNTER — HOME CARE VISIT (OUTPATIENT)
Dept: SCHEDULING | Facility: HOME HEALTH | Age: 67
End: 2017-05-29
Payer: COMMERCIAL

## 2017-05-29 VITALS
RESPIRATION RATE: 15 BRPM | SYSTOLIC BLOOD PRESSURE: 124 MMHG | TEMPERATURE: 97.7 F | DIASTOLIC BLOOD PRESSURE: 76 MMHG | HEART RATE: 83 BPM

## 2017-05-29 PROCEDURE — G0157 HHC PT ASSISTANT EA 15: HCPCS

## 2017-05-30 VITALS
HEART RATE: 75 BPM | DIASTOLIC BLOOD PRESSURE: 76 MMHG | TEMPERATURE: 97.4 F | RESPIRATION RATE: 17 BRPM | SYSTOLIC BLOOD PRESSURE: 121 MMHG

## 2017-05-31 ENCOUNTER — HOME CARE VISIT (OUTPATIENT)
Dept: SCHEDULING | Facility: HOME HEALTH | Age: 67
End: 2017-05-31
Payer: COMMERCIAL

## 2017-05-31 PROCEDURE — G0157 HHC PT ASSISTANT EA 15: HCPCS

## 2017-06-01 VITALS
HEART RATE: 87 BPM | RESPIRATION RATE: 17 BRPM | SYSTOLIC BLOOD PRESSURE: 122 MMHG | TEMPERATURE: 98.3 F | DIASTOLIC BLOOD PRESSURE: 87 MMHG

## 2017-06-02 ENCOUNTER — HOME CARE VISIT (OUTPATIENT)
Dept: SCHEDULING | Facility: HOME HEALTH | Age: 67
End: 2017-06-02
Payer: COMMERCIAL

## 2017-06-02 PROCEDURE — G0157 HHC PT ASSISTANT EA 15: HCPCS

## 2017-06-04 VITALS
TEMPERATURE: 97.6 F | RESPIRATION RATE: 17 BRPM | HEART RATE: 64 BPM | SYSTOLIC BLOOD PRESSURE: 131 MMHG | DIASTOLIC BLOOD PRESSURE: 85 MMHG

## 2017-06-05 ENCOUNTER — HOME CARE VISIT (OUTPATIENT)
Dept: SCHEDULING | Facility: HOME HEALTH | Age: 67
End: 2017-06-05
Payer: COMMERCIAL

## 2017-06-05 VITALS — SYSTOLIC BLOOD PRESSURE: 124 MMHG | HEART RATE: 76 BPM | RESPIRATION RATE: 18 BRPM | DIASTOLIC BLOOD PRESSURE: 76 MMHG

## 2017-06-05 PROCEDURE — G0151 HHCP-SERV OF PT,EA 15 MIN: HCPCS

## 2017-06-07 ENCOUNTER — HOSPITAL ENCOUNTER (OUTPATIENT)
Dept: PHYSICAL THERAPY | Age: 67
Discharge: HOME | End: 2017-06-07
Payer: COMMERCIAL

## 2017-06-07 PROCEDURE — 97016 VASOPNEUMATIC DEVICE THERAPY: CPT

## 2017-06-07 PROCEDURE — 97161 PT EVAL LOW COMPLEX 20 MIN: CPT

## 2017-06-07 NOTE — PROGRESS NOTES
Bonilla Kennedy  : 1950 [facility] VAIBHAV GOMEZ  [address] [facility], [address] [place] [address],[address], [address] [facility]  [phone]   [phone]       OUTPATIENT PHYSICAL THERAPY:Initial Assessment 2017      ICD-10: Treatment Diagnosis: Pain in left knee (M25.562); Difficulty in walking, not elsewhere classified (R26.2); Presence of left artificial knee joint (P40.847)  Precautions/Allergies: post-op TKA precautions. Neosporin [hydrocortisone] and Pcn [penicillins]   Fall Risk Score: 2 (? 5 = High Risk)  MD Orders: Eval and Treat TKA rehab (17) MEDICAL/REFERRING DIAGNOSIS: H23.977 History of knee replacement procedure L knee  left knee replacement   DATE OF ONSET: 17  REFERRING PHYSICIAN: Sherren Kennedy, MD  RETURN PHYSICIAN APPOINTMENT:17     INITIAL ASSESSMENT:  Mr. Mingo Sanders presents 3 weeks s/p L TKA. He appears to be doing very well at this point. Pain is under control. The surgical wound appears to be healing well. The knee PROM is 5-95 deg flexion. He has good muscle activation, but some difficulty isolating the quads. Post-op healing, swelling, pain, decreased ROM and strength are limiting basic mobility and normal activity level. He will benefit from PT for guided post-op rehab to promote normalized return of motion, strength, and function in order for safe return to training as an [employer] . 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 PROBLEM LIST (Impacting functional limitations):  1. Post-op pain and swelling L knee  2. Decreased ROM L knee   3. Decreased functional strength L knee/LE   4. Decreased gait skills INTERVENTIONS PLANNED:  1. Thermal and electric modalities, manual therapies for pain. 2. Manual therapies and therapeutic exercises for ROM and strength. 3. Therapeutic exercises for gait and balance   TREATMENT PLAN:  Effective Dates: 2017 TO 2017.   Frequency/Duration: 3 times a week for 4 weeks, then assess need for additional 4-8 weeks to progress strengthening toward discharge goals. GOALS: (Goals have been discussed and agreed upon with patient.)   Short-Term Functional Goals: Time Frame: 4-6 weeks*  1. Report no more than minimal, intermittent 3/10 pain L knee with basic walking and ADL's, and score greater than 40/80 on the LEFS. 2. L knee PROM is greater than or equal to 0-115 degrees flexion. 3. L quad strength is 5/5 with good terminal extension strength for stability with walking and progressing into strengthening phase. 4. Walking without limp on level surfaces and up stairs with reciprocal pattern. Discharge Goals: Time Frame: 10-12 weeks   1. No more than 3/10 pain L knee with all normalized daily home, work, and  conditioning training activities, and score greater than 55/80 on the LEFS. 2. Demonstrate good L knee and LE functional quad strength with good control walking down stairs, slopes  3. Able to balance with good control in single-leg stand greater than 15 seconds with eyes open, greater than 10 seconds with eyes closed for improved dynamic stability. 4. Independent with [facility] for advanced knee strengthening. Rehabilitation Potential For Stated Goals: Good  Regarding Rip Socks Onelia's therapy, I certify that the treatment plan above will be carried out by a therapist or under their direction. Thank you for this referral,      Liliya Guy, PT, MSPT, OCS       Referring Physician Signature:              Aren Geronimo MD               The information in this section was collected on 6-7-17 (except where otherwise noted). HISTORY:   History of Present Injury/Illness (Reason for Referral): Long history of L knee pain beginning with a sports injury when in high school, then again in the [facility].  Worsening of knee pain and swelling limiting his activities over recent years. Was treated with injections and synovial fluid replacement with limited effects. DJD diagnosed and TKA recommended. This was done 5-17-17. He was hospitalized 2 days with [address] [place] [address] rehab, then discharged home with home health PT for 2 weeks. Past Medical History/Comorbidities: s/p L TKA 5-17-17. PMH from EMR includes:  Mr. Nhung Roach  has a past medical history of Benign prostatic hyperplasia; CAD (coronary artery disease); Depression; Diastolic dysfunction; Elevated serum creatinine (04/24/2017); Former smoker; H/O echocardiogram (07/24/2014); Hypercholesteremia; Hypertension; Osteoarthritis; and Overactive bladder. He also has no past medical history of Adverse effect of anesthesia; Difficult intubation; Malignant hyperthermia due to anesthesia; Nausea & vomiting; or Pseudocholinesterase deficiency. Mr. Nhung Roach  has a past surgical history that includes urological (2013) and colonoscopy. Social History/Living Environment: Lives with his wife. Home has a flight of stairs to his home office. Prior Level of Function/Work/Activity: Works in [employer] with frequent driving trips. Was active with walking 30 minutes daily and strength training with a  the past 4-5 years. 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 Current Medications:  Tylenol prn. Has pain meds but not taking at this time. Other meds from EMR include:     Current Outpatient Prescriptions:     oxyCODONE IR (ROXICODONE) 10 mg tab immediate release tablet, Take 1 Tab by mouth every three (3) hours as needed. Max Daily Amount: 80 mg. (Patient taking differently: Take 1 Tab by mouth every three (3) hours as needed. Indications: Pain), Disp: 90 Tab, Rfl: 0    acetaminophen (TYLENOL) 500 mg tablet, Take 2 Tabs by mouth every six (6) hours. (Patient taking differently: Take 2 Tabs by mouth every six (6) hours.  Indications: Fever, Pain), Disp: 120 Tab, Rfl: 0    aspirin 81 mg chewable tablet, Take 1 Tab by mouth two (2) times a day., Disp: 60 Tab, Rfl: 0    cyclobenzaprine (FLEXERIL) 10 mg tablet, Take 0.5 Tabs by mouth three (3) times daily. , Disp: 60 Tab, Rfl: 0    gabapentin (NEURONTIN) 100 mg capsule, Take 1 Cap by mouth two (2) times a day., Disp: 60 Cap, Rfl: 0    HYDROmorphone (DILAUDID) 2 mg tablet, Take 1 Tab by mouth every four (4) hours as needed. Max Daily Amount: 12 mg., Disp: 90 Tab, Rfl: 0    senna-docusate (PERICOLACE) 8.6-50 mg per tablet, Take 2 Tabs by mouth daily. , Disp: 120 Tab, Rfl: 0    zolpidem (AMBIEN) 5 mg tablet, Take 1 Tab by mouth nightly as needed for Sleep. Max Daily Amount: 5 mg., Disp: 30 Tab, Rfl: 0    metoprolol tartrate (LOPRESSOR) 25 mg tablet, Take 25 mg by mouth two (2) times a day. Take DOS per anesthesia protocol., Disp: , Rfl:     sulindac (CLINORIL) 150 mg tablet, Take 150 mg by mouth two (2) times a day. Non-formulary. Patient instructed to bring to the hospital on the DOS, in the original bottle, and give to nurse., Disp: , Rfl:     olopatadine (PATANASE) 0.6 % spry, 2 Squirts by Both Nostrils route two (2) times a day. Non-formulary. Patient instructed to bring to the hospital on the DOS, in the original bottle, and give to nurse., Disp: , Rfl:     mirabegron ER (MYRBETRIQ) 50 mg ER tablet, Take 50 mg by mouth nightly. Non-formulary. Patient instructed to bring to the hospital on the DOS, in the original bottle, and give to nurse., Disp: , Rfl:     atorvastatin (LIPITOR) 40 mg tablet, Take 40 mg by mouth nightly., Disp: , Rfl:     lisinopril (PRINIVIL, ZESTRIL) 10 mg tablet, Take 10 mg by mouth daily. , Disp: , Rfl:     isosorbide mononitrate ER (IMDUR) 30 mg tablet, Take 30 mg by mouth daily. Take DOS per anesthesia protocol., Disp: , Rfl:     ezetimibe (ZETIA) 10 mg tablet, Take 10 mg by mouth daily. , Disp: , Rfl:     fenofibrate (LOFIBRA) 160 mg tablet, Take 160 mg by mouth daily. , Disp: , Rfl:     FLUoxetine (PROZAC) 20 mg capsule, Take 20 mg by mouth daily. Take DOS per anesthesia protocol., Disp: , Rfl:    Date Last Reviewed:  6-7-17      Number of Personal Factors/Comorbidities that affect the Plan of Care: 1-2: MODERATE COMPLEXITY   EXAMINATION:   He comes in with his wife. Observation/Orthostatic Postural Assessment: Walks into clinic without assistive device. Limp on L LE. Surgical wound to anterior L knee appears to be closed and healing well. Sutures are removed. There is moderate swelling to the knee. Palpation: Tender to L distal quads, posterior knee; calf non-tender. ROM:   LLE AROM  L Knee Flexion: 95 (in sitting)  L Knee Extension: -8 (in sitting)  LLE PROM  L Knee Flexion: 95  L Knee Extension: -5 RLE AROM  R Knee Flexion: 125 (in sitting)  R Knee Extension: 0 (in sitting)  RLE PROM  R Knee Flexion: 135  R Knee Extension: 0      Strength:    L Knee Extension: fair isometric quad contraction, but able to straight leg raise with no more than mild extensor lag. L Hip and Knee: grossly 4+ to 5/5 with manual testing. R Hip and Knee grossly 5/5 throughout. Special Tests: None  Neurological Screen: [place] neuro screen is intact. Functional Mobility:Sit to/from Stand: compensatory independent with increased use of UE's to elevate. Bed Mobility: independent. Floor transfer: not assessed. Walking on level ground: walks without assistive device, stiff knee and limp on L. Stair walking: Compensatory independent up/down steps with step-to pattern, use of one rail. Car Transfer:  Reports compensatory independent. Balance: Static Balance in Narrow stance firm surface/eyes open>30\" good control; Single-leg stand: firm surface/eyes open L=brief, R=5\". Body Structures Involved:  1. Joints  2. Muscles Body Functions Affected:  1. Neuromusculoskeletal  2. Movement Related Activities and Participation Affected:  1. General Tasks and Demands  2.  Mobility   Number of elements (examined above) that affect the Plan of Care: 4+: HIGH COMPLEXITY   CLINICAL PRESENTATION:   Presentation: Stable and uncomplicated: LOW COMPLEXITY   CLINICAL DECISION MAKING:   Outcome Measure: Tool Used: Lower Extremity Functional Scale (LEFS)  Score:  Initial: 26/80 Most Recent: X/80 (Date: -- )   Interpretation of Score: 20 questions each scored on a 5 point scale with 0 representing \"extreme difficulty or unable to perform\" and 4 representing \"no difficulty\". The lower the score, the greater the functional disability. 80/80 represents no disability. Minimal detectable change is 9 points. Medical Necessity:   · Patient is expected to demonstrate progress in strength, range of motion and balance to return to independent basic mobility and progress to return to his recreational activity. .  Reason for Services/Other Comments:  · Patient continues to require skilled intervention due to the complexity of the surgical procedure, and need for safe, progressive rehab to return to his normal activity at home, work, and recreation actiivitie. Use of outcome tool(s) and clinical judgement create a POC that gives a: Clear prediction of patient's progress: LOW COMPLEXITY          TREATMENT:   (In addition to Assessment/Re-Assessment sessions the following treatments were rendered)  Pre-treatment Symptoms/Complaints: L knee is doing well since surgery. Pain is under control. Stopped taking pain meds, just tylenol prn. Icing daily. Not using assistive device at this time, and feels he is walking ok. Walking steps with step-to pattern. Working on the HEP worked on in rehab so far since surgery. Pain: Initial: Pain Intensity 1: 1 (/10 now, 0/10 best, 8/10 worst)  Post Session: Mild increase post treatment. Therapeutic Exercise (5 Minutes  ):   Instruction and performance in initial post-op knee exercises for muscle activation, including quad setting in extension; straight leg raises in supine and side-lying, and sitting extension; initial gait mechanics with cues for push off; and swelling control including elevation, ankle pumps use of ice. Therapeutic Modalities: Cold and compression with [employer] to L knee for pain and swelling. Left Knee Cold  Type: Cold/ice pack (with pneumaitc compression, low pressure, 42 deg F.)  Duration : 15 minutes  Patient Position: Supine (with LE's elevated.)                                                                  HEP: He was instructed in post-op precautions and rehab progression. He is to work on swelling control and use of the ice/Cry-Cuff to minimize swelling. He is to work on his existing HEP and to keep in mind activity modification in order to not over-do activity. He verbalizes understanding. Treatment/Session Assessment:    · Response to Treatment: Good start to knee ROM, muscle activity, and functional mobility. · Compliance with Program/Exercises: Will assess as treatment progresses. · Recommendations/Intent for next treatment session: We will continue with initial pain and swelling control, knee motion treatment, and initial muscle activation.    Total Treatment Duration: 60 Minutes  PT Patient Time In/Time Out  Time In: 1520  Time Out: [address], PT, MSPT, OCS

## 2017-06-07 NOTE — PROGRESS NOTES
Ambulatory/Rehab Services H2 Model Falls Risk Assessment    Risk Factor Pts. ·   Confusion/Disorientation/Impulsivity  []    4 ·   Symptomatic Depression  []   2 ·   Altered Elimination  []   1 ·   Dizziness/Vertigo  []   1 ·   Gender (Male)  [x]   1 ·   Any administered antiepileptics (anticonvulsants):  []   2 ·   Any administered benzodiazepines:  []   1 ·   Visual Impairment (specify):  []   1 ·   Portable Oxygen Use  []   1 ·   Orthostatic ? BP  []   1 ·   History of Recent Falls (within 3 mos.)  []   5     Ability to Rise from Chair (choose one) Pts. ·   Ability to rise in a single movement  []   0 ·   Pushes up, successful in one attempt  [x]   1 ·   Multiple attempts, but successful  []   3 ·   Unable to rise without assistance  []   4   Total: (5 or greater = High Risk) 2     Falls Prevention Plan:   []                Physical Limitations to Exercise (specify):   []                Mobility Assistance Device (type):   []                Exercise/Equipment Adaptation (specify):    ©2010 [facility] [address] Patent #[id number].  Federal Law prohibits the replication, distribution or use without written permission from [facility] [employer]

## 2017-06-14 ENCOUNTER — HOSPITAL ENCOUNTER (OUTPATIENT)
Dept: PHYSICAL THERAPY | Age: 67
Discharge: HOME | End: 2017-06-14
Payer: COMMERCIAL

## 2017-06-14 PROCEDURE — 97140 MANUAL THERAPY 1/> REGIONS: CPT

## 2017-06-14 PROCEDURE — 97110 THERAPEUTIC EXERCISES: CPT

## 2017-06-14 PROCEDURE — 97016 VASOPNEUMATIC DEVICE THERAPY: CPT

## 2017-06-14 NOTE — PROGRESS NOTES
Trinity Singh  : 1950 [facility] VAIBHAV GOMEZ  [address] [facility], [address] [place] [address] [address] [facility]  [phone]   [phone]       OUTPATIENT PHYSICAL THERAPY:Daily Note 2017      ICD-10: Treatment Diagnosis: Pain in left knee (M25.562); Difficulty in walking, not elsewhere classified (R26.2); Presence of left artificial knee joint (E32.891)  Precautions/Allergies: post-op TKA precautions. Neosporin [hydrocortisone] and Pcn [penicillins]   Fall Risk Score: 2 (? 5 = High Risk)  MD Orders: Eval and Treat TKA rehab (17) MEDICAL/REFERRING DIAGNOSIS: A33.803 History of knee replacement procedure L knee  left knee replacement   DATE OF ONSET: 17  REFERRING PHYSICIAN: Sindi Morgan MD  RETURN PHYSICIAN APPOINTMENT:17     INITIAL ASSESSMENT:  Mr. Franco Sargent presents 3 weeks s/p L TKA. He appears to be doing very well at this point. Pain is under control. The surgical wound appears to be healing well. The knee PROM is 5-95 deg flexion. He has good muscle activation, but some difficulty isolating the quads. Post-op healing, swelling, pain, decreased ROM and strength are limiting basic mobility and normal activity level. He will benefit from PT for guided post-op rehab to promote normalized return of motion, strength, and function in order for safe return to training as an [facility] . 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 PROBLEM LIST (Impacting functional limitations):  1. Post-op pain and swelling L knee  2. Decreased ROM L knee   3. Decreased functional strength L knee/LE   4. Decreased gait skills INTERVENTIONS PLANNED:  1. Thermal and electric modalities, manual therapies for pain. 2. Manual therapies and therapeutic exercises for ROM and strength. 3. Therapeutic exercises for gait and balance   TREATMENT PLAN:  Effective Dates: 2017 TO 2017.   Frequency/Duration: 3 times a week for 4 weeks, then assess need for additional 4-8 weeks to progress strengthening toward discharge goals. GOALS: (Goals have been discussed and agreed upon with patient.)   Short-Term Functional Goals: Time Frame: 4-6 weeks*  1. Report no more than minimal, intermittent 3/10 pain L knee with basic walking and ADL's, and score greater than 40/80 on the LEFS. 2. L knee PROM is greater than or equal to 0-115 degrees flexion. 3. L quad strength is 5/5 with good terminal extension strength for stability with walking and progressing into strengthening phase. 4. Walking without limp on level surfaces and up stairs with reciprocal pattern. Discharge Goals: Time Frame: 10-12 weeks   1. No more than 3/10 pain L knee with all normalized daily home, work, and  conditioning training activities, and score greater than 55/80 on the LEFS. 2. Demonstrate good L knee and LE functional quad strength with good control walking down stairs, slopes  3. Able to balance with good control in single-leg stand greater than 15 seconds with eyes open, greater than 10 seconds with eyes closed for improved dynamic stability. 4. Independent with [facility] for advanced knee strengthening. Rehabilitation Potential For Stated Goals: Good              The information in this section was collected on 6-7-17 (except where otherwise noted). HISTORY:   History of Present Injury/Illness (Reason for Referral): Long history of L knee pain beginning with a sports injury when in high school, then again in the [facility]. Worsening of knee pain and swelling limiting his activities over recent years. Was treated with injections and synovial fluid replacement with limited effects. DJD diagnosed and TKA recommended. This was done 5-17-17. He was hospitalized 2 days with [address] [place] [address] rehab, then discharged home with home health PT for 2 weeks. Past Medical History/Comorbidities: s/p L TKA 5-17-17.  PMH from EMR includes:  Mr. Ephraim Oh  has a past medical history of Benign prostatic hyperplasia; CAD (coronary artery disease); Depression; Diastolic dysfunction; Elevated serum creatinine (04/24/2017); Former smoker; H/O echocardiogram (07/24/2014); Hypercholesteremia; Hypertension; Osteoarthritis; and Overactive bladder. He also has no past medical history of Adverse effect of anesthesia; Difficult intubation; Malignant hyperthermia due to anesthesia; Nausea & vomiting; or Pseudocholinesterase deficiency. Mr. Bandar Chang  has a past surgical history that includes urological (2013) and colonoscopy. Social History/Living Environment: Lives with his wife. Home has a flight of stairs to his home office. Prior Level of Function/Work/Activity: Works in [employer] with frequent driving trips. Was active with walking 30 minutes daily and strength training with a  the past 4-5 years. 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 Current Medications:  Tylenol prn. New prescription to acid reflux--protonix. Other meds from EMR include:     Current Outpatient Prescriptions:     oxyCODONE IR (ROXICODONE) 10 mg tab immediate release tablet, Take 1 Tab by mouth every three (3) hours as needed. Max Daily Amount: 80 mg. (Patient taking differently: Take 1 Tab by mouth every three (3) hours as needed. Indications: Pain), Disp: 90 Tab, Rfl: 0    acetaminophen (TYLENOL) 500 mg tablet, Take 2 Tabs by mouth every six (6) hours. (Patient taking differently: Take 2 Tabs by mouth every six (6) hours. Indications: Fever, Pain), Disp: 120 Tab, Rfl: 0    aspirin 81 mg chewable tablet, Take 1 Tab by mouth two (2) times a day., Disp: 60 Tab, Rfl: 0    cyclobenzaprine (FLEXERIL) 10 mg tablet, Take 0.5 Tabs by mouth three (3) times daily. , Disp: 60 Tab, Rfl: 0    gabapentin (NEURONTIN) 100 mg capsule, Take 1 Cap by mouth two (2) times a day., Disp: 60 Cap, Rfl: 0    HYDROmorphone (DILAUDID) 2 mg tablet, Take 1 Tab by mouth every four (4) hours as needed. Max Daily Amount: 12 mg., Disp: 90 Tab, Rfl: 0    senna-docusate (PERICOLACE) 8.6-50 mg per tablet, Take 2 Tabs by mouth daily. , Disp: 120 Tab, Rfl: 0    zolpidem (AMBIEN) 5 mg tablet, Take 1 Tab by mouth nightly as needed for Sleep. Max Daily Amount: 5 mg., Disp: 30 Tab, Rfl: 0    metoprolol tartrate (LOPRESSOR) 25 mg tablet, Take 25 mg by mouth two (2) times a day. Take DOS per anesthesia protocol., Disp: , Rfl:     sulindac (CLINORIL) 150 mg tablet, Take 150 mg by mouth two (2) times a day. Non-formulary. Patient instructed to bring to the hospital on the DOS, in the original bottle, and give to nurse., Disp: , Rfl:     olopatadine (PATANASE) 0.6 % spry, 2 Squirts by Both Nostrils route two (2) times a day. Non-formulary. Patient instructed to bring to the hospital on the DOS, in the original bottle, and give to nurse., Disp: , Rfl:     mirabegron ER (MYRBETRIQ) 50 mg ER tablet, Take 50 mg by mouth nightly. Non-formulary. Patient instructed to bring to the hospital on the DOS, in the original bottle, and give to nurse., Disp: , Rfl:     atorvastatin (LIPITOR) 40 mg tablet, Take 40 mg by mouth nightly., Disp: , Rfl:     lisinopril (PRINIVIL, ZESTRIL) 10 mg tablet, Take 10 mg by mouth daily. , Disp: , Rfl:     isosorbide mononitrate ER (IMDUR) 30 mg tablet, Take 30 mg by mouth daily. Take DOS per anesthesia protocol., Disp: , Rfl:     ezetimibe (ZETIA) 10 mg tablet, Take 10 mg by mouth daily. , Disp: , Rfl:     fenofibrate (LOFIBRA) 160 mg tablet, Take 160 mg by mouth daily. , Disp: , Rfl:     FLUoxetine (PROZAC) 20 mg capsule, Take 20 mg by mouth daily.  Take DOS per anesthesia protocol., Disp: , Rfl:    Date Last Reviewed:  6-14-17      Number of Personal Factors/Comorbidities that affect the Plan of Care: 1-2: MODERATE COMPLEXITY   EXAMINATION:   6/14/2017  Observation/Orthostatic Postural Assessment: Walks into clinic without assistive device, very slight limp on L LE  Palpation: Tender to L distal quads, posterior knee. ROM:   LLE PROM  L Knee Flexion: 105  L Knee Extension: -5        Strength: Not measured. R Hip and Knee grossly 5/5 throughout. Functional Mobility: Walking on level ground: improved knee flexion at terminal stance, shortened L step length, mildly wide step width noted. .  Balance: Not assessed. Body Structures Involved:  1. Joints  2. Muscles Body Functions Affected:  1. Neuromusculoskeletal  2. Movement Related Activities and Participation Affected:  1. General Tasks and Demands  2. Mobility   Number of elements (examined above) that affect the Plan of Care: 4+: HIGH COMPLEXITY   CLINICAL PRESENTATION:   Presentation: Stable and uncomplicated: LOW COMPLEXITY   CLINICAL DECISION MAKING:   Outcome Measure: Tool Used: Lower Extremity Functional Scale (LEFS)  Score:  Initial: 26/80 Most Recent: X/80 (Date: -- )   Interpretation of Score: 20 questions each scored on a 5 point scale with 0 representing \"extreme difficulty or unable to perform\" and 4 representing \"no difficulty\". The lower the score, the greater the functional disability. 80/80 represents no disability. Minimal detectable change is 9 points. Medical Necessity:   · Patient is expected to demonstrate progress in strength, range of motion and balance to return to independent basic mobility and progress to return to his recreational activity. .  Reason for Services/Other Comments:  · Patient continues to require skilled intervention due to the complexity of the surgical procedure, and need for safe, progressive rehab to return to his normal activity at home, work, and recreation actiivitie.    Use of outcome tool(s) and clinical judgement create a POC that gives a: Clear prediction of patient's progress: LOW COMPLEXITY          TREATMENT:   (In addition to Assessment/Re-Assessment sessions the following treatments were rendered)  6/14/2017  Pre-treatment Symptoms/Complaints: Says he has been having a lot of trouble with his stomach--may be ulcer related. Saw his PCP about it and was prescribed medicine for it as well as tested for a stomach infection. So not feeling well in general and fatigued. Knee has been \"ok\". Has been working on the HEP but would like a review to know fi he is doing them correctly. Pain: Initial:   1-2/10 pain reported. Post Session: Increased stiffness and soreness after cold treatment. Therapeutic Exercise (30 Minutes): Initiated bike for motion warm up with 1/2-revolutions x8'. Reviewed and performed HEP crossed extension walk with green tubing with high knee \"spring assist\" and normal walking; added retrograde walking; reviewed and performed standing terminal knee extensions with green tubing resistance 10\"x5, and terminal knee extension with R LE step-to 4-in step x10; and long sitting terminal knee extension/short arc quad with improved quad activation. Verbal, visual, and tactile cues for corrected exercise performance on these. Good understanding post training. Knee/LE motion with assisted Active Isolated Stretch to gastroc, posterior hip, and hip external rotators, 3\"x10 each. Manual Therapies (10 Minutes): Low grade mobilizations for pain and spasm vs stiffness with patellar inferior and superior glides grade 2 to 4- -, 3x30\" each; physiologic knee extension grade 4- - 3x30\"; and physiologic knee flexion in supine, grade 2 to 4- - to 4- up to pain/spasm, 4x30\" each. Therapeutic Modalities: Cold and compression with [employer] to L knee for pain and swelling. Left Knee Cold  Type: Cold/ice pack (with pneumatic compression, low pressuer, 40-deg F.)  Duration : 15 minutes  Patient Position: Supine (with head elevated.)                                                                  HEP: He is to continue with his HEP as instructed today. He verbalizes understanding. He verbalizes understanding.     Treatment/Session Assessment:    · Response to Treatment: Improved understanding of his HEP and with active isolated quad activation after training. Very good performance of the exercises and with walking mechanics. Knee motion is a little better vs last session. Still with swelling and pain. · Compliance with Program/Exercises: Appears compliant with HEP. · Recommendations/Intent for next treatment session: We will continue with initial pain and swelling control, knee motion treatment, and initial muscle activation.    Total Treatment Duration: 55 Minutes  PT Patient Time In/Time Out  Time In: 1225  Time Out: [address], PT, MSPT, OCS

## 2017-06-16 ENCOUNTER — HOSPITAL ENCOUNTER (OUTPATIENT)
Dept: PHYSICAL THERAPY | Age: 67
Discharge: HOME | End: 2017-06-16
Payer: COMMERCIAL

## 2017-06-16 PROCEDURE — 97016 VASOPNEUMATIC DEVICE THERAPY: CPT

## 2017-06-16 PROCEDURE — 97140 MANUAL THERAPY 1/> REGIONS: CPT

## 2017-06-16 NOTE — PROGRESS NOTES
Mariama Masters  : 1950 [facility] VA[facility] GOMEZ  [facility], [address] [place] [address],[address], [place] [address].  [phone]   [phone]       OUTPATIENT PHYSICAL THERAPY:Daily Note 2017      ICD-10: Treatment Diagnosis: Pain in left knee (M25.562); Difficulty in walking, not elsewhere classified (R26.2); Presence of left artificial knee joint (Z30.162)  Precautions/Allergies: post-op TKA precautions. Neosporin [hydrocortisone] and Pcn [penicillins]   Fall Risk Score: 2 (? 5 = High Risk)  MD Orders: Eval and Treat TKA rehab (17) MEDICAL/REFERRING DIAGNOSIS: S39.105 History of knee replacement procedure L knee  History of knee replacement procedure of left knee [Z96.652]   DATE OF ONSET: 17  REFERRING PHYSICIAN: Beatriz Vazquez MD  RETURN PHYSICIAN APPOINTMENT:17     INITIAL ASSESSMENT:  Mr. Nhung Roach presents 3 weeks s/p L TKA. He appears to be doing very well at this point. Pain is under control. The surgical wound appears to be healing well. The knee PROM is 5-95 deg flexion. He has good muscle activation, but some difficulty isolating the quads. Post-op healing, swelling, pain, decreased ROM and strength are limiting basic mobility and normal activity level. He will benefit from PT for guided post-op rehab to promote normalized return of motion, strength, and function in order for safe return to training as an [employer] . 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 PROBLEM LIST (Impacting functional limitations):  1. Post-op pain and swelling L knee  2. Decreased ROM L knee   3. Decreased functional strength L knee/LE   4. Decreased gait skills INTERVENTIONS PLANNED:  1. Thermal and electric modalities, manual therapies for pain. 2. Manual therapies and therapeutic exercises for ROM and strength.    3. Therapeutic exercises for gait and balance   TREATMENT PLAN:  Effective Dates: 2017 TO 9/6/2017. Frequency/Duration: 3 times a week for 4 weeks, then assess need for additional 4-8 weeks to progress strengthening toward discharge goals. GOALS: (Goals have been discussed and agreed upon with patient.)   Short-Term Functional Goals: Time Frame: 4-6 weeks*  1. Report no more than minimal, intermittent 3/10 pain L knee with basic walking and ADL's, and score greater than 40/80 on the LEFS. 2. L knee PROM is greater than or equal to 0-115 degrees flexion. 3. L quad strength is 5/5 with good terminal extension strength for stability with walking and progressing into strengthening phase. 4. Walking without limp on level surfaces and up stairs with reciprocal pattern. Discharge Goals: Time Frame: 10-12 weeks   1. No more than 3/10 pain L knee with all normalized daily home, work, and  conditioning training activities, and score greater than 55/80 on the LEFS. 2. Demonstrate good L knee and LE functional quad strength with good control walking down stairs, slopes  3. Able to balance with good control in single-leg stand greater than 15 seconds with eyes open, greater than 10 seconds with eyes closed for improved dynamic stability. 4. Independent with [facility] for advanced knee strengthening. Rehabilitation Potential For Stated Goals: Good              The information in this section was collected on 6-7-17 (except where otherwise noted). HISTORY:   History of Present Injury/Illness (Reason for Referral): Long history of L knee pain beginning with a sports injury when in high school, then again in the [facility]. Worsening of knee pain and swelling limiting his activities over recent years. Was treated with injections and synovial fluid replacement with limited effects. DJD diagnosed and TKA recommended. This was done 5-17-17. He was hospitalized 2 days with [address] [place] [address] rehab, then discharged home with home health PT for 2 weeks. Past Medical History/Comorbidities: s/p L TKA 5-17-17.  PMH from EMR includes:  Mr. Mingo Sanders  has a past medical history of Benign prostatic hyperplasia; CAD (coronary artery disease); Depression; Diastolic dysfunction; Elevated serum creatinine (04/24/2017); Former smoker; H/O echocardiogram (07/24/2014); Hypercholesteremia; Hypertension; Osteoarthritis; and Overactive bladder. He also has no past medical history of Adverse effect of anesthesia; Difficult intubation; Malignant hyperthermia due to anesthesia; Nausea & vomiting; or Pseudocholinesterase deficiency. Mr. Mingo Sanders  has a past surgical history that includes urological (2013) and colonoscopy. Social History/Living Environment: Lives with his wife. Home has a flight of stairs to his home office. Prior Level of Function/Work/Activity: Works in sales with frequent driving trips. Was active with walking 30 minutes daily and strength training with a  the past 4-5 years. Active with hunting, and works as a guide in [employer]. 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 Current Medications:  Tylenol prn. New prescription to acid reflux--protonix. Other meds from EMR include:     Current Outpatient Prescriptions:     oxyCODONE IR (ROXICODONE) 10 mg tab immediate release tablet, Take 1 Tab by mouth every three (3) hours as needed. Max Daily Amount: 80 mg. (Patient taking differently: Take 1 Tab by mouth every three (3) hours as needed. Indications: Pain), Disp: 90 Tab, Rfl: 0    acetaminophen (TYLENOL) 500 mg tablet, Take 2 Tabs by mouth every six (6) hours. (Patient taking differently: Take 2 Tabs by mouth every six (6) hours. Indications: Fever, Pain), Disp: 120 Tab, Rfl: 0    aspirin 81 mg chewable tablet, Take 1 Tab by mouth two (2) times a day., Disp: 60 Tab, Rfl: 0    cyclobenzaprine (FLEXERIL) 10 mg tablet, Take 0.5 Tabs by mouth three (3) times daily. , Disp: 60 Tab, Rfl: 0    gabapentin (NEURONTIN) 100 mg capsule, Take 1 Cap by mouth two (2) times a day., Disp: 60 Cap, Rfl: 0    HYDROmorphone (DILAUDID) 2 mg tablet, Take 1 Tab by mouth every four (4) hours as needed. Max Daily Amount: 12 mg., Disp: 90 Tab, Rfl: 0    senna-docusate (PERICOLACE) 8.6-50 mg per tablet, Take 2 Tabs by mouth daily. , Disp: 120 Tab, Rfl: 0    zolpidem (AMBIEN) 5 mg tablet, Take 1 Tab by mouth nightly as needed for Sleep. Max Daily Amount: 5 mg., Disp: 30 Tab, Rfl: 0    metoprolol tartrate (LOPRESSOR) 25 mg tablet, Take 25 mg by mouth two (2) times a day. Take DOS per anesthesia protocol., Disp: , Rfl:     sulindac (CLINORIL) 150 mg tablet, Take 150 mg by mouth two (2) times a day. Non-formulary. Patient instructed to bring to the hospital on the DOS, in the original bottle, and give to nurse., Disp: , Rfl:     olopatadine (PATANASE) 0.6 % spry, 2 Squirts by Both Nostrils route two (2) times a day. Non-formulary. Patient instructed to bring to the hospital on the DOS, in the original bottle, and give to nurse., Disp: , Rfl:     mirabegron ER (MYRBETRIQ) 50 mg ER tablet, Take 50 mg by mouth nightly. Non-formulary. Patient instructed to bring to the hospital on the DOS, in the original bottle, and give to nurse., Disp: , Rfl:     atorvastatin (LIPITOR) 40 mg tablet, Take 40 mg by mouth nightly., Disp: , Rfl:     lisinopril (PRINIVIL, ZESTRIL) 10 mg tablet, Take 10 mg by mouth daily. , Disp: , Rfl:     isosorbide mononitrate ER (IMDUR) 30 mg tablet, Take 30 mg by mouth daily. Take DOS per anesthesia protocol., Disp: , Rfl:     ezetimibe (ZETIA) 10 mg tablet, Take 10 mg by mouth daily. , Disp: , Rfl:     fenofibrate (LOFIBRA) 160 mg tablet, Take 160 mg by mouth daily. , Disp: , Rfl:     FLUoxetine (PROZAC) 20 mg capsule, Take 20 mg by mouth daily.  Take DOS per anesthesia protocol., Disp: , Rfl:    Date Last Reviewed:  6-14-17      Number of Personal Factors/Comorbidities that affect the Plan of Care: 1-2: MODERATE COMPLEXITY   EXAMINATION:   6/16/2017  Observation/Orthostatic Postural Assessment: Walks into clinic without assistive device, very slight limp on L LE  Palpation: Tender to L distal quads, posterior knee. ROM:   LLE PROM  L Knee Flexion: 105  L Knee Extension: -5        Strength: Not measured. R Hip and Knee grossly 5/5 throughout. Functional Mobility: Walking on level ground: improved knee flexion at terminal stance, shortened L step length, mildly wide step width noted. .  Balance: Not assessed. Body Structures Involved:  1. Joints  2. Muscles Body Functions Affected:  1. Neuromusculoskeletal  2. Movement Related Activities and Participation Affected:  1. General Tasks and Demands  2. Mobility   Number of elements (examined above) that affect the Plan of Care: 4+: HIGH COMPLEXITY   CLINICAL PRESENTATION:   Presentation: Stable and uncomplicated: LOW COMPLEXITY   CLINICAL DECISION MAKING:   Outcome Measure: Tool Used: Lower Extremity Functional Scale (LEFS)  Score:  Initial: 26/80 Most Recent: X/80 (Date: -- )   Interpretation of Score: 20 questions each scored on a 5 point scale with 0 representing \"extreme difficulty or unable to perform\" and 4 representing \"no difficulty\". The lower the score, the greater the functional disability. 80/80 represents no disability. Minimal detectable change is 9 points. Medical Necessity:   · Patient is expected to demonstrate progress in strength, range of motion and balance to return to independent basic mobility and progress to return to his recreational activity. .  Reason for Services/Other Comments:  · Patient continues to require skilled intervention due to the complexity of the surgical procedure, and need for safe, progressive rehab to return to his normal activity at home, work, and recreation actiivitie.    Use of outcome tool(s) and clinical judgement create a POC that gives a: Clear prediction of patient's progress: LOW COMPLEXITY          TREATMENT:   (In addition to Assessment/Re-Assessment sessions the following treatments were rendered)  6/16/2017  Pre-treatment Symptoms/Complaints: Says he feeling better. The medicine he started for his stomach has helped. Feels his knee is not as swollen and is moving a little better. Has been working on his HEP. Has been icing 3 times a day, but feels stiff after icing. Took an epsom salt bath yesterday with good relief of symptoms after. Pain: Initial:   No VAS reported. Post Session: Increased stiffness and soreness after cold treatment. Bike for motion warm up with 1/2-revolutions x10'. Manual Therapies (30 Minutes): Brief lymphatic massage, gentle soft tissue and myofascial work to L knee region for restrictions here. Low grade mobilizations for pain and spasm vs stiffness with patellar inferior and superior glides grade 2 to 4- -, 3x30\" each; physiologic knee extension grade 4- -, 4x30\"; physiologic knee flexion in supine, grade 2 to 4- - to 4- up to pain/spasm, 4x30\" each; and physiologic mobilization to flexion in sitting with reciprocal inhibition, 20\"x5 rep. Therapeutic Exercise (5 Minutes): Exercises for knee and whole LE motion with supine assisted stretch to gastroc, posterior hip, hamstrings in 90/90 and SLR, and hip external rotators in straight knee position, 3\"x10 each. Reviewed HEP, and modified supine heel slides to heel slide+knee to chest. He has a good understanding of his existing HEP performance. Therapeutic Modalities: Cold and compression with [employer] to L knee for pain and swelling. Left Knee Cold  Type: Cold/ice pack (with pneumatic compression, low pressure, 44 deg F.)  Duration : 10 minutes  Patient Position: Supine                                                                  HEP: He is to continue with his HEP as instructed today. He is to decrease knee icing to 1x/day. He verbalizes understanding. Treatment/Session Assessment:    · Response to Treatment: He is 4 weeks post-op.  Improving walking skills and muscle activation. Good knee ROM at this point, but still with a little extension lag noted. Pain is under control. Tender to quads and peripatellar and scar areas, and decreased skin mobility noted to distal 1/2 of scar. · Compliance with Program/Exercises: Appears compliant with HEP. · Recommendations/Intent for next treatment session: We will continue with initial pain and swelling control, knee motion treatment, and initial muscle activation. He is to start in Aquatic Therapy next week for open and closed chain knee exercises in a reduced joint weight loading environment.    Total Treatment Duration: 45 Minutes  PT Patient Time In/Time Out  Time In: 2428  Time Out: [address], PT, [facility]SPT, OCS

## 2017-06-20 ENCOUNTER — HOSPITAL ENCOUNTER (OUTPATIENT)
Dept: PHYSICAL THERAPY | Age: 67
Discharge: HOME | End: 2017-06-20
Payer: COMMERCIAL

## 2017-06-20 PROCEDURE — 97113 AQUATIC THERAPY/EXERCISES: CPT

## 2017-06-20 NOTE — PROGRESS NOTES
Dillan Blayne  : 1950 [facility] VAIBHAV GOMEZ  [address] [facility], [address] [place] [address],[address], [place] [address].  [phone]   [phone]       OUTPATIENT PHYSICAL THERAPY:Daily Note and Aquatic 2017      ICD-10: Treatment Diagnosis: Pain in left knee (M25.562); Difficulty in walking, not elsewhere classified (R26.2); Presence of left artificial knee joint (C70.701)  Precautions/Allergies: post-op TKA precautions. Neosporin [hydrocortisone] and Pcn [penicillins]   Fall Risk Score: 2 (? 5 = High Risk)  MD Orders: Eval and Treat TKA rehab (17) MEDICAL/REFERRING DIAGNOSIS: E56.291 History of knee replacement procedure L knee  History of knee replacement procedure of left knee [Z96.652]   DATE OF ONSET: 17  REFERRING PHYSICIAN: Loly Chávez MD  RETURN PHYSICIAN APPOINTMENT:17     INITIAL ASSESSMENT:  Mr. Bandar Chang presents 3 weeks s/p L TKA. He appears to be doing very well at this point. Pain is under control. The surgical wound appears to be healing well. The knee PROM is 5-95 deg flexion. He has good muscle activation, but some difficulty isolating the quads. Post-op healing, swelling, pain, decreased ROM and strength are limiting basic mobility and normal activity level. 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 PROBLEM LIST (Impacting functional limitations):  1. Post-op pain and swelling L knee  2. Decreased ROM L knee   3. Decreased functional strength L knee/LE   4. Decreased gait skills INTERVENTIONS PLANNED:  1. Thermal and electric modalities, manual therapies for pain. 2. Manual therapies and therapeutic exercises for ROM and strength.    3. Therapeutic exercises for gait and balance   TREATMENT PLAN:  Effective Dates: 6/7/2017 TO 9/6/2017. Frequency/Duration: 3 times a week for 4 weeks, then assess need for additional 4-8 weeks to progress strengthening toward discharge goals. GOALS: (Goals have been discussed and agreed upon with patient.)   Short-Term Functional Goals: Time Frame: 4-6 weeks*  1. Report no more than minimal, intermittent 3/10 pain L knee with basic walking and ADL's, and score greater than 40/80 on the LEFS. 2. L knee PROM is greater than or equal to 0-115 degrees flexion. 3. L quad strength is 5/5 with good terminal extension strength for stability with walking and progressing into strengthening phase. 4. Walking without limp on level surfaces and up stairs with reciprocal pattern. Discharge Goals: Time Frame: 10-12 weeks   1. No more than 3/10 pain L knee with all normalized daily home, work, and  conditioning training activities, and score greater than 55/80 on the LEFS. 2. Demonstrate good L knee and LE functional quad strength with good control walking down stairs, slopes  3. Able to balance with good control in single-leg stand greater than 15 seconds with eyes open, greater than 10 seconds with eyes closed for improved dynamic stability. 4. Independent with [facility] for advanced knee strengthening. Rehabilitation Potential For Stated Goals: Good              The information in this section was collected on 6-7-17 (except where otherwise noted). HISTORY:   History of Present Injury/Illness (Reason for Referral): Long history of L knee pain beginning with a sports injury when in high school, then again in the [facility]. Worsening of knee pain and swelling limiting his activities over recent years. Was treated with injections and synovial fluid replacement with limited effects. DJD diagnosed and TKA recommended. This was done 5-17-17. He was hospitalized 2 days with [address] [place] [address] rehab, then discharged home with home health PT for 2 weeks.    Past Medical History/Comorbidities: s/p L TKA 5-17-17. PMH from EMR includes:  Mr. Nguyen Childs  has a past medical history of Benign prostatic hyperplasia; CAD (coronary artery disease); Depression; Diastolic dysfunction; Elevated serum creatinine (04/24/2017); Former smoker; H/O echocardiogram (07/24/2014); Hypercholesteremia; Hypertension; Osteoarthritis; and Overactive bladder. He also has no past medical history of Adverse effect of anesthesia; Difficult intubation; Malignant hyperthermia due to anesthesia; Nausea & vomiting; or Pseudocholinesterase deficiency. Mr. Nguyen Childs  has a past surgical history that includes urological (2013) and colonoscopy. Social History/Living Environment: Lives with his wife. Home has a flight of stairs to his home office. Prior Level of Function/Work/Activity: Works in sales with frequent driving trips. Was active with walking 30 minutes daily and strength training with a  the past 4-5 years. Active with hunting, and works as a guide in [employer]. 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 Current Medications:  Tylenol prn. New prescription to acid reflux--protonix. Other meds from EMR include:     Current Outpatient Prescriptions:     oxyCODONE IR (ROXICODONE) 10 mg tab immediate release tablet, Take 1 Tab by mouth every three (3) hours as needed. Max Daily Amount: 80 mg. (Patient taking differently: Take 1 Tab by mouth every three (3) hours as needed. Indications: Pain), Disp: 90 Tab, Rfl: 0    acetaminophen (TYLENOL) 500 mg tablet, Take 2 Tabs by mouth every six (6) hours. (Patient taking differently: Take 2 Tabs by mouth every six (6) hours. Indications: Fever, Pain), Disp: 120 Tab, Rfl: 0    aspirin 81 mg chewable tablet, Take 1 Tab by mouth two (2) times a day., Disp: 60 Tab, Rfl: 0    cyclobenzaprine (FLEXERIL) 10 mg tablet, Take 0.5 Tabs by mouth three (3) times daily. , Disp: 60 Tab, Rfl: 0    gabapentin (NEURONTIN) 100 mg capsule, Take 1 Cap by mouth two (2) times a day., Disp: 60 Cap, Rfl: 0    HYDROmorphone (DILAUDID) 2 mg tablet, Take 1 Tab by mouth every four (4) hours as needed. Max Daily Amount: 12 mg., Disp: 90 Tab, Rfl: 0    senna-docusate (PERICOLACE) 8.6-50 mg per tablet, Take 2 Tabs by mouth daily. , Disp: 120 Tab, Rfl: 0    zolpidem (AMBIEN) 5 mg tablet, Take 1 Tab by mouth nightly as needed for Sleep. Max Daily Amount: 5 mg., Disp: 30 Tab, Rfl: 0    metoprolol tartrate (LOPRESSOR) 25 mg tablet, Take 25 mg by mouth two (2) times a day. Take DOS per anesthesia protocol., Disp: , Rfl:     sulindac (CLINORIL) 150 mg tablet, Take 150 mg by mouth two (2) times a day. Non-formulary. Patient instructed to bring to the hospital on the DOS, in the original bottle, and give to nurse., Disp: , Rfl:     olopatadine (PATANASE) 0.6 % spry, 2 Squirts by Both Nostrils route two (2) times a day. Non-formulary. Patient instructed to bring to the hospital on the DOS, in the original bottle, and give to nurse., Disp: , Rfl:     mirabegron ER (MYRBETRIQ) 50 mg ER tablet, Take 50 mg by mouth nightly. Non-formulary. Patient instructed to bring to the hospital on the DOS, in the original bottle, and give to nurse., Disp: , Rfl:     atorvastatin (LIPITOR) 40 mg tablet, Take 40 mg by mouth nightly., Disp: , Rfl:     lisinopril (PRINIVIL, ZESTRIL) 10 mg tablet, Take 10 mg by mouth daily. , Disp: , Rfl:     isosorbide mononitrate ER (IMDUR) 30 mg tablet, Take 30 mg by mouth daily. Take DOS per anesthesia protocol., Disp: , Rfl:     ezetimibe (ZETIA) 10 mg tablet, Take 10 mg by mouth daily. , Disp: , Rfl:     fenofibrate (LOFIBRA) 160 mg tablet, Take 160 mg by mouth daily. , Disp: , Rfl:     FLUoxetine (PROZAC) 20 mg capsule, Take 20 mg by mouth daily.  Take DOS per anesthesia protocol., Disp: , Rfl:    Date Last Reviewed:  6-14-17      Number of Personal Factors/Comorbidities that affect the Plan of Care: 1-2: MODERATE COMPLEXITY   EXAMINATION:   6/20/2017  Observation/Orthostatic Postural Assessment: Walks into clinic without assistive device, very slight limp on L LE  Palpation: Tender to L distal quads, posterior knee. ROM:            Strength: Not measured. R Hip and Knee grossly 5/5 throughout. Functional Mobility: Walking on level ground: improved knee flexion at terminal stance, shortened L step length, mildly wide step width noted. .  Balance: Not assessed. Body Structures Involved:  1. Joints  2. Muscles Body Functions Affected:  1. Neuromusculoskeletal  2. Movement Related Activities and Participation Affected:  1. General Tasks and Demands  2. Mobility   Number of elements (examined above) that affect the Plan of Care: 4+: HIGH COMPLEXITY   CLINICAL PRESENTATION:   Presentation: Stable and uncomplicated: LOW COMPLEXITY   CLINICAL DECISION MAKING:   Outcome Measure: Tool Used: Lower Extremity Functional Scale (LEFS)  Score:  Initial: 26/80 Most Recent: X/80 (Date: -- )   Interpretation of Score: 20 questions each scored on a 5 point scale with 0 representing \"extreme difficulty or unable to perform\" and 4 representing \"no difficulty\". The lower the score, the greater the functional disability. 80/80 represents no disability. Minimal detectable change is 9 points. Medical Necessity:   · Patient is expected to demonstrate progress in strength, range of motion and balance to return to independent basic mobility and progress to return to his recreational activity. .  Reason for Services/Other Comments:  · Patient continues to require skilled intervention due to the complexity of the surgical procedure, and need for safe, progressive rehab to return to his normal activity at home, work, and recreation actiivitie.    Use of outcome tool(s) and clinical judgement create a POC that gives a: Clear prediction of patient's progress: LOW COMPLEXITY          TREATMENT:   (In addition to Assessment/Re-Assessment sessions the following treatments were rendered)  6/20/2017  Pre-treatment Symptoms/Complaints:  Pt states the swelling in his knee has decreased over the last week. Pain: Initial:    Minimal-not rated Post Session:  3/10 visually. Bike for motion warm up with 1/2-revolutions x10'. Manual Therapies (30 Minutes): Brief lymphatic massage, gentle soft tissue and myofascial work to L knee region for restrictions here. Low grade mobilizations for pain and spasm vs stiffness with patellar inferior and superior glides grade 2 to 4- -, 3x30\" each; physiologic knee extension grade 4- -, 4x30\"; physiologic knee flexion in supine, grade 2 to 4- - to 4- up to pain/spasm, 4x30\" each; and physiologic mobilization to flexion in sitting with reciprocal inhibition, 20\"x5 rep. Therapeutic Exercise ( ): Exercises for knee and whole LE motion with supine assisted stretch to gastroc, posterior hip, hamstrings in 90/90 and SLR, and hip external rotators in straight knee position, 3\"x10 each. Reviewed HEP, and modified supine heel slides to heel slide+knee to chest. He has a good understanding of his existing HEP performance. Therapeutic Modalities: Cold and compression with [employer] to L knee for pain and swelling. HEP: He is to continue with his HEP as instructed today. He is to decrease knee icing to 1x/day. He verbalizes understanding. Aquatic Therapy (45 minutes): Aquatic treatment performed per flow grid for Decreased muscle strength, Decreased static/dynamic balance and reactive control, Decreased range of motion, Decreased activity endurance, Decompression, Ease of movement and Low impact and reduced weight bearing activity. Cues provided for posture.     Aquatic Exercise Log       Date  6/20/17 Date   Date   Date   Date     Activity/ Exercise Parameters Parameters Parameters Parameters Parameters   Walking forward 4       Walking backward 4       Walking sideways 4         Marching 4         Goose Step 4 Tip toes 2         Heels 2         Lunges Walking 4       Side step squats        LE Exercises 4. 5' with small noodle under foot for resistance         Hip Flex/Ext Marching 10         Hip Abd/Add 10         Hip IR/ER          Calf raises          Knee Flex 10         Squats          Leg Circles 10/10         Step Ups 10       Deep H2O/ Noodles 7' with arm rings         Stabilization          Arms only          Legs only Jog 3 min       Cross   Country 2 min         Scissors 2 min         Crab walk        Lower abdominal   work  [employer] 2 min         Cardio          Jogging        Lap   Swimming          Stretches          Hamstrings          Heelcords          Piriformis          Neck            Treatment/Session Assessment:    · Response to Treatment:  Pt tolerated aquatic exercises well with increase in ROM. Did well with added resistance. · Compliance with Program/Exercises: Appears compliant with HEP. · Recommendations/Intent for next treatment session: We will continue with initial pain and swelling control, knee motion treatment, and initial muscle activation. He is to start in Aquatic Therapy next week for open and closed chain knee exercises in a reduced joint weight loading environment.    Total Treatment Duration: 45 Minutes     Time in:  11:15  Time out:  12:00  Pepper Coles, PTA

## 2017-06-22 ENCOUNTER — HOSPITAL ENCOUNTER (OUTPATIENT)
Dept: PHYSICAL THERAPY | Age: 67
Discharge: HOME | End: 2017-06-22
Payer: COMMERCIAL

## 2017-06-22 PROCEDURE — 97140 MANUAL THERAPY 1/> REGIONS: CPT

## 2017-06-22 PROCEDURE — 97110 THERAPEUTIC EXERCISES: CPT

## 2017-06-22 NOTE — PROGRESS NOTES
Charles Cabrera  : 1950 [facility] VAIBHAV GOMEZ  [address] [facility], [address] [place] [address] [address], [address] [facility]  [phone]   [phone]       OUTPATIENT PHYSICAL THERAPY:Daily Note 2017      ICD-10: Treatment Diagnosis: Pain in left knee (M25.562); Difficulty in walking, not elsewhere classified (R26.2); Presence of left artificial knee joint (M85.843)  Precautions/Allergies: post-op TKA precautions. Neosporin [hydrocortisone] and Pcn [penicillins]   Fall Risk Score: 2 (? 5 = High Risk)  MD Orders: Eval and Treat TKA rehab (17) MEDICAL/REFERRING DIAGNOSIS: H99.675 History of knee replacement procedure L knee  History of knee replacement procedure of left knee [Z96.652]   DATE OF ONSET: 17  REFERRING PHYSICIAN: Ashley Conway MD  RETURN PHYSICIAN APPOINTMENT:17     INITIAL ASSESSMENT:  Mr. Maritza Barboza presents 3 weeks s/p L TKA. He appears to be doing very well at this point. Pain is under control. The surgical wound appears to be healing well. The knee PROM is 5-95 deg flexion. He has good muscle activation, but some difficulty isolating the quads. Post-op healing, swelling, pain, decreased ROM and strength are limiting basic mobility and normal activity level. He will benefit from PT for guided post-op rehab to promote normalized return of motion, strength, and function in order for safe return to training as an [facility] [place] . 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 PROBLEM LIST (Impacting functional limitations):  1. Post-op pain and swelling L knee  2. Decreased ROM L knee   3. Decreased functional strength L knee/LE   4. Decreased gait skills INTERVENTIONS PLANNED:  1. Thermal and electric modalities, manual therapies for pain. 2. Manual therapies and therapeutic exercises for ROM and strength.    3. Therapeutic exercises for gait and balance   TREATMENT PLAN:  Effective Dates: 2017 TO 9/6/2017. Frequency/Duration: 3 times a week for 4 weeks, then assess need for additional 4-8 weeks to progress strengthening toward discharge goals. GOALS: (Goals have been discussed and agreed upon with patient.)   Short-Term Functional Goals: Time Frame: 4-6 weeks*  1. Report no more than minimal, intermittent 3/10 pain L knee with basic walking and ADL's, and score greater than 40/80 on the LEFS. 2. L knee PROM is greater than or equal to 0-115 degrees flexion. 3. L quad strength is 5/5 with good terminal extension strength for stability with walking and progressing into strengthening phase. 4. Walking without limp on level surfaces and up stairs with reciprocal pattern. Discharge Goals: Time Frame: 10-12 weeks   1. No more than 3/10 pain L knee with all normalized daily home, work, and  conditioning training activities, and score greater than 55/80 on the LEFS. 2. Demonstrate good L knee and LE functional quad strength with good control walking down stairs, slopes  3. Able to balance with good control in single-leg stand greater than 15 seconds with eyes open, greater than 10 seconds with eyes closed for improved dynamic stability. 4. Independent with [facility] for advanced knee strengthening. Rehabilitation Potential For Stated Goals: Good              The information in this section was collected on 6-7-17 (except where otherwise noted). HISTORY:   History of Present Injury/Illness (Reason for Referral): Long history of L knee pain beginning with a sports injury when in high school, then again in the [facility]. Worsening of knee pain and swelling limiting his activities over recent years. Was treated with injections and synovial fluid replacement with limited effects. DJD diagnosed and TKA recommended. This was done 5-17-17. He was hospitalized 2 days with [facility] rehab, then discharged home with home health PT for 2 weeks. Past Medical History/Comorbidities: s/p L TKA 5-17-17.  PMH from EMR includes:  Mr. Xena Oseguera  has a past medical history of Benign prostatic hyperplasia; CAD (coronary artery disease); Depression; Diastolic dysfunction; Elevated serum creatinine (04/24/2017); Former smoker; H/O echocardiogram (07/24/2014); Hypercholesteremia; Hypertension; Osteoarthritis; and Overactive bladder. He also has no past medical history of Adverse effect of anesthesia; Difficult intubation; Malignant hyperthermia due to anesthesia; Nausea & vomiting; or Pseudocholinesterase deficiency. Mr. Xena Oseguera  has a past surgical history that includes urological (2013) and colonoscopy. Social History/Living Environment: Lives with his wife. Home has a flight of stairs to his home office. Prior Level of Function/Work/Activity: Works in sales with frequent driving trips. Was active with walking 30 minutes daily and strength training with a  the past 4-5 years. Active with hunting, and works as a guide in [employer]. 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 Current Medications:  Tylenol prn. Protonix and 2 anti-biotics for 2 weeks to finish 6/30/17. Other meds from EMR include:     Current Outpatient Prescriptions:     oxyCODONE IR (ROXICODONE) 10 mg tab immediate release tablet, Take 1 Tab by mouth every three (3) hours as needed. Max Daily Amount: 80 mg. (Patient taking differently: Take 1 Tab by mouth every three (3) hours as needed. Indications: Pain), Disp: 90 Tab, Rfl: 0    acetaminophen (TYLENOL) 500 mg tablet, Take 2 Tabs by mouth every six (6) hours. (Patient taking differently: Take 2 Tabs by mouth every six (6) hours. Indications: Fever, Pain), Disp: 120 Tab, Rfl: 0    aspirin 81 mg chewable tablet, Take 1 Tab by mouth two (2) times a day., Disp: 60 Tab, Rfl: 0    cyclobenzaprine (FLEXERIL) 10 mg tablet, Take 0.5 Tabs by mouth three (3) times daily. , Disp: 60 Tab, Rfl: 0    gabapentin (NEURONTIN) 100 mg capsule, Take 1 Cap by mouth two (2) times a day., Disp: 60 Cap, Rfl: 0    HYDROmorphone (DILAUDID) 2 mg tablet, Take 1 Tab by mouth every four (4) hours as needed. Max Daily Amount: 12 mg., Disp: 90 Tab, Rfl: 0    senna-docusate (PERICOLACE) 8.6-50 mg per tablet, Take 2 Tabs by mouth daily. , Disp: 120 Tab, Rfl: 0    zolpidem (AMBIEN) 5 mg tablet, Take 1 Tab by mouth nightly as needed for Sleep. Max Daily Amount: 5 mg., Disp: 30 Tab, Rfl: 0    metoprolol tartrate (LOPRESSOR) 25 mg tablet, Take 25 mg by mouth two (2) times a day. Take DOS per anesthesia protocol., Disp: , Rfl:     sulindac (CLINORIL) 150 mg tablet, Take 150 mg by mouth two (2) times a day. Non-formulary. Patient instructed to bring to the hospital on the DOS, in the original bottle, and give to nurse., Disp: , Rfl:     olopatadine (PATANASE) 0.6 % spry, 2 Squirts by Both Nostrils route two (2) times a day. Non-formulary. Patient instructed to bring to the hospital on the DOS, in the original bottle, and give to nurse., Disp: , Rfl:     mirabegron ER (MYRBETRIQ) 50 mg ER tablet, Take 50 mg by mouth nightly. Non-formulary. Patient instructed to bring to the hospital on the DOS, in the original bottle, and give to nurse., Disp: , Rfl:     atorvastatin (LIPITOR) 40 mg tablet, Take 40 mg by mouth nightly., Disp: , Rfl:     lisinopril (PRINIVIL, ZESTRIL) 10 mg tablet, Take 10 mg by mouth daily. , Disp: , Rfl:     isosorbide mononitrate ER (IMDUR) 30 mg tablet, Take 30 mg by mouth daily. Take DOS per anesthesia protocol., Disp: , Rfl:     ezetimibe (ZETIA) 10 mg tablet, Take 10 mg by mouth daily. , Disp: , Rfl:     fenofibrate (LOFIBRA) 160 mg tablet, Take 160 mg by mouth daily. , Disp: , Rfl:     FLUoxetine (PROZAC) 20 mg capsule, Take 20 mg by mouth daily.  Take DOS per anesthesia protocol., Disp: , Rfl:    Date Last Reviewed:  6-22-17      Number of Personal Factors/Comorbidities that affect the Plan of Care: 1-2: MODERATE COMPLEXITY   EXAMINATION:   6/22/2017  Observation/Orthostatic Postural Assessment: Walks into clinic without assistive device, very slight limp on L LE  Palpation: Tender to L distal quads, posterior knee. ROM:   LLE AROM  L Knee Flexion: 103 (in sitting)  L Knee Extension: -10 (in sitting)  LLE PROM  L Knee Flexion: 113 (post motion treatment)  L Knee Extension: -3 (post motion treatment)        Strength:      R Hip and Knee grossly 5/5 throughout. Functional Mobility: Walking on level ground: Good knee mechanics on L, mildly wide step width and mild decreased trunk control noted, but improved with conscious correction. Stair walking: able to walk up/down stairs with reciprocal pattern, mild limp on L stance with descent more than ascent; uses bilat rails for UE support. Balance: Not assessed. Body Structures Involved:  1. Joints  2. Muscles Body Functions Affected:  1. Neuromusculoskeletal  2. Movement Related Activities and Participation Affected:  1. General Tasks and Demands  2. Mobility   Number of elements (examined above) that affect the Plan of Care: 4+: HIGH COMPLEXITY   CLINICAL PRESENTATION:   Presentation: Stable and uncomplicated: LOW COMPLEXITY   CLINICAL DECISION MAKING:   Outcome Measure: Tool Used: Lower Extremity Functional Scale (LEFS)  Score:  Initial: 26/80 Most Recent: X/80 (Date: -- )   Interpretation of Score: 20 questions each scored on a 5 point scale with 0 representing \"extreme difficulty or unable to perform\" and 4 representing \"no difficulty\". The lower the score, the greater the functional disability. 80/80 represents no disability. Minimal detectable change is 9 points. Medical Necessity:   · Patient is expected to demonstrate progress in strength, range of motion and balance to return to independent basic mobility and progress to return to his recreational activity. .  Reason for Services/Other Comments:  · Patient continues to require skilled intervention due to the complexity of the surgical procedure, and need for safe, progressive rehab to return to his normal activity at home, work, and recreation actiivitie. Use of outcome tool(s) and clinical judgement create a POC that gives a: Clear prediction of patient's progress: LOW COMPLEXITY          TREATMENT:   (In addition to Assessment/Re-Assessment sessions the following treatments were rendered)  6/22/2017  Pre-treatment Symptoms/Complaints:  Says his knee is doing better. Still with stiffness, but swelling and pain are improved. Not having to take any OTC meds for it lately. Has been doing well with the pool therapy. Feels he is getting stronger. Some feeling coming back around scar, but there is some sensitivity to he shorts/pants touching it. Stomach ulcer issue is improved. On medicine for this and 2 antibiotics that finish in a week. Pain: Initial:   Minimal soreness. Not rated  Post Session: No significant increase in pain reported. Bike for motion warm up with 1/2-revolutions progressing to full revolutions x12'. Manual Therapies (20 Minutes): Brief lymphatic massage, gentle soft tissue and myofascial work to L knee region, scar for restrictions here. Mobilizations for pain stiffness with patellar inferior and superior glides grade 2 to 4- - to 4-, 3x30\" each; physiologic knee extension grade 4- - to 4-, 3x30\"; physiologic knee flexion in supine, grade 2 to 4- - to 4- up to firm end feel, 4x30\"; and combo mobilization with patellar inferior glide/tibial posterior glide with active ankle dorsiflex at near end range knee flexion, grade 3 sustained, 3x10 reps. Therapeutic Exercise (35 Minutes): Exercises for knee and whole LE motion and strength with supine assisted stretch to posterior hip knee to chest for whole leg flexion, and gastroc stretch for extension, 3\"x10 each. Muscle activation with short arq quad x10; sitting knee extension x20, then with 3# 2x15 with red band resisted contralateral knee flexion.  Functional strength with forward step up on to 4-inch step with emphasis on essential components and mechanics, then progressed to stair walking up/down with reciprocal pattern and use of rails with emphasis on mechanics. HEP: Instruction in self scar mobilization, and added active ankle dorsiflexion in hook-lying at end range knee flexion. He is to continue with his HEP. He can start walking stairs with reciprocal pattern as long as pain and proper mechanics allow. He verbalizes understanding. Treatment/Session Assessment:    · Response to Treatment: He is 5 weeks s/p L TKA. He continues to make good progress in knee ROM, functional strength and return to function. Good increase in knee flexion ROM with treatment today. Still a little stiff to extension. Quad muscle action is better, and good start to functional strength with stair walking. · Compliance with Program/Exercises: Appears compliant with HEP. · Recommendations/Intent for next treatment session: We will continue with pain and swelling control, knee motion treatment, and progressive isolated and functional strengthening. We will continue with Aquatic Therapy for open and closed chain knee exercises in a reduced joint weight loading environment and clinic treatments for motion and functional strengthening.    Total Treatment Duration: 55 Minutes  PT Patient Time In/Time Out  Time In: 1115  Time Out: [address], PT, MSPT, OCS

## 2017-06-26 ENCOUNTER — HOSPITAL ENCOUNTER (OUTPATIENT)
Dept: PHYSICAL THERAPY | Age: 67
Discharge: HOME | End: 2017-06-26
Payer: COMMERCIAL

## 2017-06-26 PROCEDURE — 97113 AQUATIC THERAPY/EXERCISES: CPT

## 2017-06-26 NOTE — PROGRESS NOTES
Cecilia Angeles  : 1950 [facility] VAIBHAV GOMEZ  [address] [facility], [address] [place] [address],[address], [place] [address].  [phone]   [phone]       OUTPATIENT PHYSICAL THERAPY:Daily Note 2017      ICD-10: Treatment Diagnosis: Pain in left knee (M25.562); Difficulty in walking, not elsewhere classified (R26.2); Presence of left artificial knee joint (L94.288)  Precautions/Allergies: post-op TKA precautions. Neosporin [hydrocortisone] and Pcn [penicillins]   Fall Risk Score: 2 (? 5 = High Risk)  MD Orders: Eval and Treat TKA rehab (17) MEDICAL/REFERRING DIAGNOSIS: L00.916 History of knee replacement procedure L knee  History of knee replacement procedure of left knee [Z96.652]   DATE OF ONSET: 17  REFERRING PHYSICIAN: Miki Macdonald MD  RETURN PHYSICIAN APPOINTMENT:17     INITIAL ASSESSMENT:  Mr. Xena Oseguera presents 3 weeks s/p L TKA. He appears to be doing very well at this point. Pain is under control. The surgical wound appears to be healing well. The knee PROM is 5-95 deg flexion. He has good muscle activation, but some difficulty isolating the quads. Post-op healing, swelling, pain, decreased ROM and strength are limiting basic mobility and normal activity level. He will benefit from PT for guided post-op rehab to promote normalized return of motion, strength, and function in order for safe return to training as an [facility] . 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 PROBLEM LIST (Impacting functional limitations):  1. Post-op pain and swelling L knee  2. Decreased ROM L knee   3. Decreased functional strength L knee/LE   4. Decreased gait skills INTERVENTIONS PLANNED:  1. Thermal and electric modalities, manual therapies for pain. 2. Manual therapies and therapeutic exercises for ROM and strength.    3. Therapeutic exercises for gait and balance   TREATMENT PLAN:  Effective Dates: 2017 TO 9/6/2017. Frequency/Duration: 3 times a week for 4 weeks, then assess need for additional 4-8 weeks to progress strengthening toward discharge goals. GOALS: (Goals have been discussed and agreed upon with patient.)   Short-Term Functional Goals: Time Frame: 4-6 weeks*  1. Report no more than minimal, intermittent 3/10 pain L knee with basic walking and ADL's, and score greater than 40/80 on the LEFS. 2. L knee PROM is greater than or equal to 0-115 degrees flexion. 3. L quad strength is 5/5 with good terminal extension strength for stability with walking and progressing into strengthening phase. 4. Walking without limp on level surfaces and up stairs with reciprocal pattern. Discharge Goals: Time Frame: 10-12 weeks   1. No more than 3/10 pain L knee with all normalized daily home, work, and  conditioning training activities, and score greater than 55/80 on the LEFS. 2. Demonstrate good L knee and LE functional quad strength with good control walking down stairs, slopes  3. Able to balance with good control in single-leg stand greater than 15 seconds with eyes open, greater than 10 seconds with eyes closed for improved dynamic stability. 4. Independent with [facility] for advanced knee strengthening. Rehabilitation Potential For Stated Goals: Good              The information in this section was collected on 6-7-17 (except where otherwise noted). HISTORY:   History of Present Injury/Illness (Reason for Referral): Long history of L knee pain beginning with a sports injury when in high school, then again in the [facility]. Worsening of knee pain and swelling limiting his activities over recent years. Was treated with injections and synovial fluid replacement with limited effects. DJD diagnosed and TKA recommended. This was done 5-17-17. He was hospitalized 2 days with [address] [place] [address] rehab, then discharged home with home health PT for 2 weeks. Past Medical History/Comorbidities: s/p L TKA 5-17-17.  PMH from EMR includes:  Mr. Franco Sargent  has a past medical history of Benign prostatic hyperplasia; CAD (coronary artery disease); Depression; Diastolic dysfunction; Elevated serum creatinine (04/24/2017); Former smoker; H/O echocardiogram (07/24/2014); Hypercholesteremia; Hypertension; Osteoarthritis; and Overactive bladder. He also has no past medical history of Adverse effect of anesthesia; Difficult intubation; Malignant hyperthermia due to anesthesia; Nausea & vomiting; or Pseudocholinesterase deficiency. Mr. Franco Sargent  has a past surgical history that includes urological (2013) and colonoscopy. Social History/Living Environment: Lives with his wife. Home has a flight of stairs to his home office. Prior Level of Function/Work/Activity: Works in sales with frequent driving trips. Was active with walking 30 minutes daily and strength training with a  the past 4-5 years. 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 Current Medications:  Tylenol prn. Protonix and 2 anti-biotics for 2 weeks to finish 6/30/17. Other meds from EMR include:     Current Outpatient Prescriptions:     oxyCODONE IR (ROXICODONE) 10 mg tab immediate release tablet, Take 1 Tab by mouth every three (3) hours as needed. Max Daily Amount: 80 mg. (Patient taking differently: Take 1 Tab by mouth every three (3) hours as needed. Indications: Pain), Disp: 90 Tab, Rfl: 0    acetaminophen (TYLENOL) 500 mg tablet, Take 2 Tabs by mouth every six (6) hours. (Patient taking differently: Take 2 Tabs by mouth every six (6) hours. Indications: Fever, Pain), Disp: 120 Tab, Rfl: 0    aspirin 81 mg chewable tablet, Take 1 Tab by mouth two (2) times a day., Disp: 60 Tab, Rfl: 0    cyclobenzaprine (FLEXERIL) 10 mg tablet, Take 0.5 Tabs by mouth three (3) times daily. , Disp: 60 Tab, Rfl: 0    gabapentin (NEURONTIN) 100 mg capsule, Take 1 Cap by mouth two (2) times a day., Disp: 60 Cap, Rfl: 0    HYDROmorphone (DILAUDID) 2 mg tablet, Take 1 Tab by mouth every four (4) hours as needed. Max Daily Amount: 12 mg., Disp: 90 Tab, Rfl: 0    senna-docusate (PERICOLACE) 8.6-50 mg per tablet, Take 2 Tabs by mouth daily. , Disp: 120 Tab, Rfl: 0    zolpidem (AMBIEN) 5 mg tablet, Take 1 Tab by mouth nightly as needed for Sleep. Max Daily Amount: 5 mg., Disp: 30 Tab, Rfl: 0    metoprolol tartrate (LOPRESSOR) 25 mg tablet, Take 25 mg by mouth two (2) times a day. Take DOS per anesthesia protocol., Disp: , Rfl:     sulindac (CLINORIL) 150 mg tablet, Take 150 mg by mouth two (2) times a day. Non-formulary. Patient instructed to bring to the hospital on the DOS, in the original bottle, and give to nurse., Disp: , Rfl:     olopatadine (PATANASE) 0.6 % spry, 2 Squirts by Both Nostrils route two (2) times a day. Non-formulary. Patient instructed to bring to the hospital on the DOS, in the original bottle, and give to nurse., Disp: , Rfl:     mirabegron ER (MYRBETRIQ) 50 mg ER tablet, Take 50 mg by mouth nightly. Non-formulary. Patient instructed to bring to the hospital on the DOS, in the original bottle, and give to nurse., Disp: , Rfl:     atorvastatin (LIPITOR) 40 mg tablet, Take 40 mg by mouth nightly., Disp: , Rfl:     lisinopril (PRINIVIL, ZESTRIL) 10 mg tablet, Take 10 mg by mouth daily. , Disp: , Rfl:     isosorbide mononitrate ER (IMDUR) 30 mg tablet, Take 30 mg by mouth daily. Take DOS per anesthesia protocol., Disp: , Rfl:     ezetimibe (ZETIA) 10 mg tablet, Take 10 mg by mouth daily. , Disp: , Rfl:     fenofibrate (LOFIBRA) 160 mg tablet, Take 160 mg by mouth daily. , Disp: , Rfl:     FLUoxetine (PROZAC) 20 mg capsule, Take 20 mg by mouth daily.  Take DOS per anesthesia protocol., Disp: , Rfl:    Date Last Reviewed:  6-22-17      Number of Personal Factors/Comorbidities that affect the Plan of Care: 1-2: MODERATE COMPLEXITY   EXAMINATION:   6/26/2017  Observation/Orthostatic Postural Assessment: Walks into clinic without assistive device, very slight limp on L LE  Palpation: Tender to L distal quads, posterior knee. ROM:            Strength:      R Hip and Knee grossly 5/5 throughout. Functional Mobility: Walking on level ground: Good knee mechanics on L, mildly wide step width and mild decreased trunk control noted, but improved with conscious correction. Stair walking: able to walk up/down stairs with reciprocal pattern, mild limp on L stance with descent more than ascent; uses bilat rails for UE support. Balance: Not assessed. Body Structures Involved:  1. Joints  2. Muscles Body Functions Affected:  1. Neuromusculoskeletal  2. Movement Related Activities and Participation Affected:  1. General Tasks and Demands  2. Mobility   Number of elements (examined above) that affect the Plan of Care: 4+: HIGH COMPLEXITY   CLINICAL PRESENTATION:   Presentation: Stable and uncomplicated: LOW COMPLEXITY   CLINICAL DECISION MAKING:   Outcome Measure: Tool Used: Lower Extremity Functional Scale (LEFS)  Score:  Initial: 26/80 Most Recent: X/80 (Date: -- )   Interpretation of Score: 20 questions each scored on a 5 point scale with 0 representing \"extreme difficulty or unable to perform\" and 4 representing \"no difficulty\". The lower the score, the greater the functional disability. 80/80 represents no disability. Minimal detectable change is 9 points. Medical Necessity:   · Patient is expected to demonstrate progress in strength, range of motion and balance to return to independent basic mobility and progress to return to his recreational activity. .  Reason for Services/Other Comments:  · Patient continues to require skilled intervention due to the complexity of the surgical procedure, and need for safe, progressive rehab to return to his normal activity at home, work, and recreation actiivitie.    Use of outcome tool(s) and clinical judgement create a POC that gives a: Clear prediction of patient's progress: LOW COMPLEXITY          TREATMENT:   (In addition to Assessment/Re-Assessment sessions the following treatments were rendered)  6/26/2017  Pre-treatment Symptoms/Complaints:    Pain: Initial:   Minimal soreness. Not rated  Post Session: No significant increase in pain reported. Aquatic Therapy (45 minutes): Aquatic treatment performed per flow grid for Decreased muscle strength, Decreased endurance, Decreased static/dynamic balance and reactive control and Ease of movement. Cues provided for ex and gait. Aquatic Exercise Log       Date  6/26 Date   Date   Date   Date     Activity/ Exercise Parameters Parameters Parameters Parameters Parameters   Walking forward 6       Walking backward 6       Walking sideways 6         Marching 6         Goose Step 6         Tip toes 3         Heels 3         Lunges        Side step squats        LE Exercises 2#         Hip Flex/Ext 12 with noodle         Hip Abd/Add 12 with noodle         Hip IR/ER          Calf raises 12         Knee Flex 12         Squats          Leg Circles 10/10         Step Ups 12       UE Exercises          Squeeze In          Push Down          Pull Down          Bicep/Tricep        Rows/Press outs         Chi Positions          Trunk Rotation        Deep H2O/ Noodles 7' with noodle and 2#         Stabilization          Arms only          Legs only  jog 2 min       Cross   Country 2 min         Scissors 2 min         Crab walk Knee ext kicks  2 min       Lower abdominal   work           Cardio          Jogging        Lap   Swimming          Stretches          Hamstrings          Heelcords          Piriformis          Neck              Treatment/Session Assessment:    · Response to Treatment: did well with aquatic ex. Increased resistance. · Compliance with Program/Exercises: Appears compliant with HEP. · Recommendations/Intent for next treatment session:  We will continue with pain and swelling control, knee motion treatment, and progressive isolated and functional strengthening. We will continue with Aquatic Therapy for open and closed chain knee exercises in a reduced joint weight loading environment and clinic treatments for motion and functional strengthening.    Total Treatment Duration: 45 Minutes  PT Patient Time In/Time Out  Time In: 1030  Time Out: Sharon 141, PT

## 2017-06-28 ENCOUNTER — HOSPITAL ENCOUNTER (OUTPATIENT)
Dept: PHYSICAL THERAPY | Age: 67
Discharge: HOME | End: 2017-06-28
Payer: COMMERCIAL

## 2017-06-28 PROCEDURE — 97113 AQUATIC THERAPY/EXERCISES: CPT

## 2017-06-28 NOTE — PROGRESS NOTES
Sarah Sloan  : 1950 [facility] VAIBHAV GOMEZ  [address] [facility], [address] [place] [address],[address], [facility].  [phone]   [phone]       OUTPATIENT PHYSICAL THERAPY:Daily Note 2017      ICD-10: Treatment Diagnosis: Pain in left knee (M25.562); Difficulty in walking, not elsewhere classified (R26.2); Presence of left artificial knee joint (A61.711)  Precautions/Allergies: post-op TKA precautions. Neosporin [hydrocortisone] and Pcn [penicillins]   Fall Risk Score: 2 (? 5 = High Risk)  MD Orders: Eval and Treat TKA rehab (17) MEDICAL/REFERRING DIAGNOSIS: S30.583 History of knee replacement procedure L knee  History of knee replacement procedure of left knee [Z96.652]   DATE OF ONSET: 17  REFERRING PHYSICIAN: Yasmin Gray MD  RETURN PHYSICIAN APPOINTMENT:17     INITIAL ASSESSMENT:  Mr. Asha Gatica presents 3 weeks s/p L TKA. He appears to be doing very well at this point. Pain is under control. The surgical wound appears to be healing well. The knee PROM is 5-95 deg flexion. He has good muscle activation, but some difficulty isolating the quads. Post-op healing, swelling, pain, decreased ROM and strength are limiting basic mobility and normal activity level. He will benefit from PT for guided post-op rehab to promote normalized return of motion, strength, and function in order for safe return to training as an [facility] . 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 PROBLEM LIST (Impacting functional limitations):  1. Post-op pain and swelling L knee  2. Decreased ROM L knee   3. Decreased functional strength L knee/LE   4. Decreased gait skills INTERVENTIONS PLANNED:  1. Thermal and electric modalities, manual therapies for pain. 2. Manual therapies and therapeutic exercises for ROM and strength.    3. Therapeutic exercises for gait and balance   TREATMENT PLAN:  Effective Dates: 2017 TO 9/6/2017. Frequency/Duration: 3 times a week for 4 weeks, then assess need for additional 4-8 weeks to progress strengthening toward discharge goals. GOALS: (Goals have been discussed and agreed upon with patient.)   Short-Term Functional Goals: Time Frame: 4-6 weeks*  1. Report no more than minimal, intermittent 3/10 pain L knee with basic walking and ADL's, and score greater than 40/80 on the LEFS. 2. L knee PROM is greater than or equal to 0-115 degrees flexion. 3. L quad strength is 5/5 with good terminal extension strength for stability with walking and progressing into strengthening phase. 4. Walking without limp on level surfaces and up stairs with reciprocal pattern. Discharge Goals: Time Frame: 10-12 weeks   1. No more than 3/10 pain L knee with all normalized daily home, work, and  conditioning training activities, and score greater than 55/80 on the LEFS. 2. Demonstrate good L knee and LE functional quad strength with good control walking down stairs, slopes  3. Able to balance with good control in single-leg stand greater than 15 seconds with eyes open, greater than 10 seconds with eyes closed for improved dynamic stability. 4. Independent with [facility] for advanced knee strengthening. Rehabilitation Potential For Stated Goals: Good              The information in this section was collected on 6-7-17 (except where otherwise noted). HISTORY:   History of Present Injury/Illness (Reason for Referral): Long history of L knee pain beginning with a sports injury when in high school, then again in the [facility]. Worsening of knee pain and swelling limiting his activities over recent years. Was treated with injections and synovial fluid replacement with limited effects. DJD diagnosed and TKA recommended. This was done 5-17-17. He was hospitalized 2 days with [address] [place] [address] rehab, then discharged home with home health PT for 2 weeks. Past Medical History/Comorbidities: s/p L TKA 5-17-17.  PMH from EMR includes:  Mr. Sandeep Mendoza  has a past medical history of Benign prostatic hyperplasia; CAD (coronary artery disease); Depression; Diastolic dysfunction; Elevated serum creatinine (04/24/2017); Former smoker; H/O echocardiogram (07/24/2014); Hypercholesteremia; Hypertension; Osteoarthritis; and Overactive bladder. He also has no past medical history of Adverse effect of anesthesia; Difficult intubation; Malignant hyperthermia due to anesthesia; Nausea & vomiting; or Pseudocholinesterase deficiency. Mr. Sandeep Mendoza  has a past surgical history that includes urological (2013) and colonoscopy. Social History/Living Environment: Lives with his wife. Home has a flight of stairs to his home office. Prior Level of Function/Work/Activity: Works in sales with frequent driving trips. Was active with walking 30 minutes daily and strength training with a  the past 4-5 years. Active with hunting, and works as a guide in [email or web address]. 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 Current Medications:  Tylenol prn. Protonix and 2 anti-biotics for 2 weeks to finish 6/30/17. Other meds from EMR include:     Current Outpatient Prescriptions:     oxyCODONE IR (ROXICODONE) 10 mg tab immediate release tablet, Take 1 Tab by mouth every three (3) hours as needed. Max Daily Amount: 80 mg. (Patient taking differently: Take 1 Tab by mouth every three (3) hours as needed. Indications: Pain), Disp: 90 Tab, Rfl: 0    acetaminophen (TYLENOL) 500 mg tablet, Take 2 Tabs by mouth every six (6) hours. (Patient taking differently: Take 2 Tabs by mouth every six (6) hours. Indications: Fever, Pain), Disp: 120 Tab, Rfl: 0    aspirin 81 mg chewable tablet, Take 1 Tab by mouth two (2) times a day., Disp: 60 Tab, Rfl: 0    cyclobenzaprine (FLEXERIL) 10 mg tablet, Take 0.5 Tabs by mouth three (3) times daily. , Disp: 60 Tab, Rfl: 0    gabapentin (NEURONTIN) 100 mg capsule, Take 1 Cap by mouth two (2) times a day., Disp: 60 Cap, Rfl: 0    HYDROmorphone (DILAUDID) 2 mg tablet, Take 1 Tab by mouth every four (4) hours as needed. Max Daily Amount: 12 mg., Disp: 90 Tab, Rfl: 0    senna-docusate (PERICOLACE) 8.6-50 mg per tablet, Take 2 Tabs by mouth daily. , Disp: 120 Tab, Rfl: 0    zolpidem (AMBIEN) 5 mg tablet, Take 1 Tab by mouth nightly as needed for Sleep. Max Daily Amount: 5 mg., Disp: 30 Tab, Rfl: 0    metoprolol tartrate (LOPRESSOR) 25 mg tablet, Take 25 mg by mouth two (2) times a day. Take DOS per anesthesia protocol., Disp: , Rfl:     sulindac (CLINORIL) 150 mg tablet, Take 150 mg by mouth two (2) times a day. Non-formulary. Patient instructed to bring to the hospital on the DOS, in the original bottle, and give to nurse., Disp: , Rfl:     olopatadine (PATANASE) 0.6 % spry, 2 Squirts by Both Nostrils route two (2) times a day. Non-formulary. Patient instructed to bring to the hospital on the DOS, in the original bottle, and give to nurse., Disp: , Rfl:     mirabegron ER (MYRBETRIQ) 50 mg ER tablet, Take 50 mg by mouth nightly. Non-formulary. Patient instructed to bring to the hospital on the DOS, in the original bottle, and give to nurse., Disp: , Rfl:     atorvastatin (LIPITOR) 40 mg tablet, Take 40 mg by mouth nightly., Disp: , Rfl:     lisinopril (PRINIVIL, ZESTRIL) 10 mg tablet, Take 10 mg by mouth daily. , Disp: , Rfl:     isosorbide mononitrate ER (IMDUR) 30 mg tablet, Take 30 mg by mouth daily. Take DOS per anesthesia protocol., Disp: , Rfl:     ezetimibe (ZETIA) 10 mg tablet, Take 10 mg by mouth daily. , Disp: , Rfl:     fenofibrate (LOFIBRA) 160 mg tablet, Take 160 mg by mouth daily. , Disp: , Rfl:     FLUoxetine (PROZAC) 20 mg capsule, Take 20 mg by mouth daily.  Take DOS per anesthesia protocol., Disp: , Rfl:    Date Last Reviewed:  6-28-17      Number of Personal Factors/Comorbidities that affect the Plan of Care: 1-2: MODERATE COMPLEXITY   EXAMINATION:   6/28/2017  Observation/Orthostatic Postural Assessment: Walks into clinic without assistive device, very slight limp on L LE  Palpation: Tender to L distal quads, posterior knee. ROM:            Strength:      R Hip and Knee grossly 5/5 throughout. Functional Mobility: Walking on level ground: Good knee mechanics on L, mildly wide step width and mild decreased trunk control noted, but improved with conscious correction. Stair walking: able to walk up/down stairs with reciprocal pattern, mild limp on L stance with descent more than ascent; uses bilat rails for UE support. Balance: Not assessed. Body Structures Involved:  1. Joints  2. Muscles Body Functions Affected:  1. Neuromusculoskeletal  2. Movement Related Activities and Participation Affected:  1. General Tasks and Demands  2. Mobility   Number of elements (examined above) that affect the Plan of Care: 4+: HIGH COMPLEXITY   CLINICAL PRESENTATION:   Presentation: Stable and uncomplicated: LOW COMPLEXITY   CLINICAL DECISION MAKING:   Outcome Measure: Tool Used: Lower Extremity Functional Scale (LEFS)  Score:  Initial: 26/80 Most Recent: X/80 (Date: -- )   Interpretation of Score: 20 questions each scored on a 5 point scale with 0 representing \"extreme difficulty or unable to perform\" and 4 representing \"no difficulty\". The lower the score, the greater the functional disability. 80/80 represents no disability. Minimal detectable change is 9 points. Medical Necessity:   · Patient is expected to demonstrate progress in strength, range of motion and balance to return to independent basic mobility and progress to return to his recreational activity. .  Reason for Services/Other Comments:  · Patient continues to require skilled intervention due to the complexity of the surgical procedure, and need for safe, progressive rehab to return to his normal activity at home, work, and recreation actiivitie.    Use of outcome tool(s) and clinical judgement create a POC that gives a: Clear prediction of patient's progress: LOW COMPLEXITY          TREATMENT:   (In addition to Assessment/Re-Assessment sessions the following treatments were rendered)  6/28/2017  Pre-treatment Symptoms/Complaints:  No complaints. States he is more flexible with each pool visit. Pain: Initial:   1/10 Post Session: No significant increase in pain reported. Aquatic Therapy (45 minutes): Aquatic treatment performed per flow grid for Decreased muscle strength, Decreased endurance, Decreased static/dynamic balance and reactive control and Ease of movement. Cues provided for ex and gait. Aquatic Exercise Log       Date  6/26 Date  6/28 Date   Date   Date     Activity/ Exercise Parameters Parameters Parameters Parameters Parameters   Walking forward 6 6      Walking backward 6 6      Walking sideways 6 6        Marching 6 6        Goose Step 6 6        Tip toes 3 3        Heels 3 3        Lunges  6      Side step squats        LE Exercises 2# 3#        Hip Flex/Ext 12 with noodle 12 with noodle        Hip Abd/Add 12 with noodle 12        Hip IR/ER          Calf raises 12 12        Knee Flex 12 12        Squats  12        Leg Circles 10/10 10/10        Step Ups 12 12      UE Exercises          Squeeze In          Push Down          Pull Down          Bicep/Tricep        Rows/Press outs         Chi Positions          Trunk Rotation        Deep H2O/ Noodles 7' with noodle and 2# 7' with noodle 3#        Stabilization          Arms only          Legs only  jog 2 min Jog 2 min      Cross   Country 2 min 2 min        Scissors 2 min 2 min        Crab walk Knee ext kicks  2 min Knee ext 2 min      Lower abdominal   work           Cardio          Jogging        Lap   Swimming          Stretches          Hamstrings  On step 3 x 20        Heelcords          flexion  On step 3 x 20        Neck              Treatment/Session Assessment:    · Response to Treatment: did well with aquatic ex. Increased resistance.     · Compliance with Program/Exercises: Appears compliant with HEP. · Recommendations/Intent for next treatment session: We will continue with pain and swelling control, knee motion treatment, and progressive isolated and functional strengthening. We will continue with Aquatic Therapy for open and closed chain knee exercises in a reduced joint weight loading environment and clinic treatments for motion and functional strengthening.    Total Treatment Duration: 45 Minutes  PT Patient Time In/Time Out  Time In: 1045  Time Out: Renita 115, PT

## 2017-06-30 ENCOUNTER — HOSPITAL ENCOUNTER (OUTPATIENT)
Dept: PHYSICAL THERAPY | Age: 67
Discharge: HOME | End: 2017-06-30
Payer: COMMERCIAL

## 2017-06-30 PROCEDURE — 97110 THERAPEUTIC EXERCISES: CPT

## 2017-06-30 PROCEDURE — 97140 MANUAL THERAPY 1/> REGIONS: CPT

## 2017-06-30 NOTE — PROGRESS NOTES
Caryle Friar  : 1950 [facility] at [facility] VAIBHAV GOMEZ  [address] [facility], [address] [place] [address],[address], [place] [address].  [phone]   [phone]       OUTPATIENT PHYSICAL THERAPY:Daily Note 2017      ICD-10: Treatment Diagnosis: Pain in left knee (M25.562); Difficulty in walking, not elsewhere classified (R26.2); Presence of left artificial knee joint (B57.115)  Precautions/Allergies: post-op TKA precautions. Neosporin [hydrocortisone] and Pcn [penicillins]   Fall Risk Score: 2 (? 5 = High Risk)  MD Orders: Eval and Treat TKA rehab (17) MEDICAL/REFERRING DIAGNOSIS: L60.044 History of knee replacement procedure L knee  History of knee replacement procedure of left knee [Z96.652]   DATE OF ONSET: 17  REFERRING PHYSICIAN: Mikey Fleischer, MD  RETURN PHYSICIAN APPOINTMENT:17     INITIAL ASSESSMENT:  Mr. Sarah Carey presents 3 weeks s/p L TKA. He appears to be doing very well at this point. Pain is under control. The surgical wound appears to be healing well. The knee PROM is 5-95 deg flexion. He has good muscle activation, but some difficulty isolating the quads. Post-op healing, swelling, pain, decreased ROM and strength are limiting basic mobility and normal activity level. He will benefit from PT for guided post-op rehab to promote normalized return of motion, strength, and function in order for safe return to training as an [facility] . 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 PROBLEM LIST (Impacting functional limitations):  1. Post-op pain and swelling L knee  2. Decreased ROM L knee   3. Decreased functional strength L knee/LE   4. Decreased gait skills INTERVENTIONS PLANNED:  1. Thermal and electric modalities, manual therapies for pain. 2. Manual therapies and therapeutic exercises for ROM and strength.    3. Therapeutic exercises for gait and balance   TREATMENT PLAN:  Effective Dates: 2017 TO 9/6/2017. Frequency/Duration: 3 times a week for 4 weeks, then assess need for additional 4-8 weeks to progress strengthening toward discharge goals. GOALS: (Goals have been discussed and agreed upon with patient.)   Short-Term Functional Goals: Time Frame: 4-6 weeks*  1. Report no more than minimal, intermittent 3/10 pain L knee with basic walking and ADL's, and score greater than 40/80 on the LEFS. 2. L knee PROM is greater than or equal to 0-115 degrees flexion. 3. L quad strength is 5/5 with good terminal extension strength for stability with walking and progressing into strengthening phase. 4. Walking without limp on level surfaces and up stairs with reciprocal pattern. Discharge Goals: Time Frame: 10-12 weeks   1. No more than 3/10 pain L knee with all normalized daily home, work, and  conditioning training activities, and score greater than 55/80 on the LEFS. 2. Demonstrate good L knee and LE functional quad strength with good control walking down stairs, slopes  3. Able to balance with good control in single-leg stand greater than 15 seconds with eyes open, greater than 10 seconds with eyes closed for improved dynamic stability. 4. Independent with [facility] for advanced knee strengthening. Rehabilitation Potential For Stated Goals: Good              The information in this section was collected on 6-7-17 (except where otherwise noted). HISTORY:   History of Present Injury/Illness (Reason for Referral): Long history of L knee pain beginning with a sports injury when in high school, then again in the [facility]. Worsening of knee pain and swelling limiting his activities over recent years. Was treated with injections and synovial fluid replacement with limited effects. DJD diagnosed and TKA recommended. This was done 5-17-17. He was hospitalized 2 days with [address] [place] [address] rehab, then discharged home with home health PT for 2 weeks. Past Medical History/Comorbidities: s/p L TKA 5-17-17.  PMH from EMR includes:  Mr. Ranjit Villalba  has a past medical history of Benign prostatic hyperplasia; CAD (coronary artery disease); Depression; Diastolic dysfunction; Elevated serum creatinine (04/24/2017); Former smoker; H/O echocardiogram (07/24/2014); Hypercholesteremia; Hypertension; Osteoarthritis; and Overactive bladder. He also has no past medical history of Adverse effect of anesthesia; Difficult intubation; Malignant hyperthermia due to anesthesia; Nausea & vomiting; or Pseudocholinesterase deficiency. Mr. Ranjit Villalba  has a past surgical history that includes urological (2013) and colonoscopy. Social History/Living Environment: Lives with his wife. Home has a flight of stairs to his home office. Prior Level of Function/Work/Activity: Works in sales with frequent driving trips. Was active with walking 30 minutes daily and strength training with a  the past 4-5 years. Active with hunting, and works as a guide in [employer]. 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 Current Medications:  Tylenol prn. Protonix and 2 anti-biotics for 2 weeks to finish 6/30/17. Other meds from EMR include:     Current Outpatient Prescriptions:     oxyCODONE IR (ROXICODONE) 10 mg tab immediate release tablet, Take 1 Tab by mouth every three (3) hours as needed. Max Daily Amount: 80 mg. (Patient taking differently: Take 1 Tab by mouth every three (3) hours as needed. Indications: Pain), Disp: 90 Tab, Rfl: 0    acetaminophen (TYLENOL) 500 mg tablet, Take 2 Tabs by mouth every six (6) hours. (Patient taking differently: Take 2 Tabs by mouth every six (6) hours. Indications: Fever, Pain), Disp: 120 Tab, Rfl: 0    aspirin 81 mg chewable tablet, Take 1 Tab by mouth two (2) times a day., Disp: 60 Tab, Rfl: 0    cyclobenzaprine (FLEXERIL) 10 mg tablet, Take 0.5 Tabs by mouth three (3) times daily. , Disp: 60 Tab, Rfl: 0    gabapentin (NEURONTIN) 100 mg capsule, Take 1 Cap by mouth two (2) times a day., Disp: 60 Cap, Rfl: 0    HYDROmorphone (DILAUDID) 2 mg tablet, Take 1 Tab by mouth every four (4) hours as needed. Max Daily Amount: 12 mg., Disp: 90 Tab, Rfl: 0    senna-docusate (PERICOLACE) 8.6-50 mg per tablet, Take 2 Tabs by mouth daily. , Disp: 120 Tab, Rfl: 0    zolpidem (AMBIEN) 5 mg tablet, Take 1 Tab by mouth nightly as needed for Sleep. Max Daily Amount: 5 mg., Disp: 30 Tab, Rfl: 0    metoprolol tartrate (LOPRESSOR) 25 mg tablet, Take 25 mg by mouth two (2) times a day. Take DOS per anesthesia protocol., Disp: , Rfl:     sulindac (CLINORIL) 150 mg tablet, Take 150 mg by mouth two (2) times a day. Non-formulary. Patient instructed to bring to the hospital on the DOS, in the original bottle, and give to nurse., Disp: , Rfl:     olopatadine (PATANASE) 0.6 % spry, 2 Squirts by Both Nostrils route two (2) times a day. Non-formulary. Patient instructed to bring to the hospital on the DOS, in the original bottle, and give to nurse., Disp: , Rfl:     mirabegron ER (MYRBETRIQ) 50 mg ER tablet, Take 50 mg by mouth nightly. Non-formulary. Patient instructed to bring to the hospital on the DOS, in the original bottle, and give to nurse., Disp: , Rfl:     atorvastatin (LIPITOR) 40 mg tablet, Take 40 mg by mouth nightly., Disp: , Rfl:     lisinopril (PRINIVIL, ZESTRIL) 10 mg tablet, Take 10 mg by mouth daily. , Disp: , Rfl:     isosorbide mononitrate ER (IMDUR) 30 mg tablet, Take 30 mg by mouth daily. Take DOS per anesthesia protocol., Disp: , Rfl:     ezetimibe (ZETIA) 10 mg tablet, Take 10 mg by mouth daily. , Disp: , Rfl:     fenofibrate (LOFIBRA) 160 mg tablet, Take 160 mg by mouth daily. , Disp: , Rfl:     FLUoxetine (PROZAC) 20 mg capsule, Take 20 mg by mouth daily.  Take DOS per anesthesia protocol., Disp: , Rfl:    Date Last Reviewed:  6-28-17      Number of Personal Factors/Comorbidities that affect the Plan of Care: 1-2: MODERATE COMPLEXITY   EXAMINATION:   6/30/2017  Observation/Orthostatic Postural Assessment: Walks into clinic without assistive device, very slight limp on L LE  Palpation: Tender to L distal quads, posterior knee. ROM:            Strength:      R Hip and Knee grossly 5/5 throughout. Functional Mobility: Walking on level ground: Good knee mechanics on L, mildly wide step width and mild decreased trunk control noted, but improved with conscious correction. Stair walking: able to walk up/down stairs with reciprocal pattern, mild limp on L stance with descent more than ascent; uses bilat rails for UE support. Balance: Not assessed. Body Structures Involved:  1. Joints  2. Muscles Body Functions Affected:  1. Neuromusculoskeletal  2. Movement Related Activities and Participation Affected:  1. General Tasks and Demands  2. Mobility   Number of elements (examined above) that affect the Plan of Care: 4+: HIGH COMPLEXITY   CLINICAL PRESENTATION:   Presentation: Stable and uncomplicated: LOW COMPLEXITY   CLINICAL DECISION MAKING:   Outcome Measure: Tool Used: Lower Extremity Functional Scale (LEFS)  Score:  Initial: 26/80 Most Recent: X/80 (Date: -- )   Interpretation of Score: 20 questions each scored on a 5 point scale with 0 representing \"extreme difficulty or unable to perform\" and 4 representing \"no difficulty\". The lower the score, the greater the functional disability. 80/80 represents no disability. Minimal detectable change is 9 points. Medical Necessity:   · Patient is expected to demonstrate progress in strength, range of motion and balance to return to independent basic mobility and progress to return to his recreational activity. .  Reason for Services/Other Comments:  · Patient continues to require skilled intervention due to the complexity of the surgical procedure, and need for safe, progressive rehab to return to his normal activity at home, work, and recreation actiivitie.    Use of outcome tool(s) and clinical judgement create a POC that gives a: Clear prediction of patient's progress: LOW COMPLEXITY          TREATMENT:   (In addition to Assessment/Re-Assessment sessions the following treatments were rendered)  6/30/2017  Pre-treatment Symptoms/Complaints:  Pt stated that he is improving and wants to be able to bend his knee 120 degrees in a week. Pain: Initial:   0-1/10 Post Session: No significant increase in pain reported. Aquatic Therapy 0 minutes): Not performed this date. Aquatic Exercise Log       Date  6/26 Date  6/28 Date   Date   Date     Activity/ Exercise Parameters Parameters Parameters Parameters Parameters   Walking forward 6 6      Walking backward 6 6      Walking sideways 6 6        Marching 6 6        Goose Step 6 6        Tip toes 3 3        Heels 3 3        Lunges  6      Side step squats        LE Exercises 2# 3#        Hip Flex/Ext 12 with noodle 12 with noodle        Hip Abd/Add 12 with noodle 12        Hip IR/ER          Calf raises 12 12        Knee Flex 12 12        Squats  12        Leg Circles 10/10 10/10        Step Ups 12 12      UE Exercises          Squeeze In          Push Down          Pull Down          Bicep/Tricep        Rows/Press outs         Chi Positions          Trunk Rotation        Deep H2O/ Noodles 7' with noodle and 2# 7' with noodle 3#        Stabilization          Arms only          Legs only  jog 2 min Jog 2 min      Cross   Country 2 min 2 min        Scissors 2 min 2 min        Crab walk Knee ext kicks  2 min Knee ext 2 min      Lower abdominal   work           Cardio          Jogging        Lap   Swimming          Stretches          Hamstrings  On step 3 x 20        Heelcords          flexion  On step 3 x 20        Neck          Therapeutic Exercise: (27 Minutes):  Exercises per grid below to improve mobility, strength and L knee ROM. Required minimal verbal, manual and tactile cues to promote proper body mechanics. Progressed range and repetitions as indicated.      Date:  6/30/17 Date:   Date:     Activity/Exercise Parameters Parameters Parameters   Quad sets 2 x 15     Short arc quads 2 x 15     Seated hamstring curls Green; 2 x 15     Heel slides 1 x 20     Straight leg raise 2 x 10     Long arc quad  2x15     Slantboard calf stretch 3 x 30\"     Step ups 4\", 2 x 10     Manual therapy (20 minutes): Grade III-IV L knee extension mobilizations in supine and flexion mobilizations in sitting for improved L knee ROM; Grade III-IV patellar mobilizations inferior/superior for improved L knee ROM; scar massage to minimize soft tissue restrictions to ROM  Treatment/Session Assessment:    · Response to Treatment: Pt demonstrates improving L knee ROM and tolerated therapeutic exercises well. Patient making excellent progress with his rehab. · Compliance with Program/Exercises: Appears compliant. · Recommendations/Intent for next treatment session: We will continue with pain and swelling control, knee motion treatment, and progressive isolated and functional strengthening. We will continue with Aquatic Therapy for open and closed chain knee exercises in a reduced joint weight loading environment and clinic treatments for motion and functional strengthening.    Total Treatment Duration: 47 Minutes  PT Patient Time In/Time Out  Time In: 0932  Time Out: 2025 Joshua Moyer, PT

## 2017-06-30 NOTE — PROGRESS NOTES
Kasey Maria  : 1950 [facility] at [facility] VAIBHAV GOMEZ  [address] [facility], [address] [place] [address],[address], [place] [address].  [phone]   [phone]       OUTPATIENT PHYSICAL THERAPY:Daily Note 2017      ICD-10: Treatment Diagnosis: Pain in left knee (M25.562); Difficulty in walking, not elsewhere classified (R26.2); Presence of left artificial knee joint (F14.028)  Precautions/Allergies: post-op TKA precautions. Neosporin [hydrocortisone] and Pcn [penicillins]   Fall Risk Score: 2 (? 5 = High Risk)  MD Orders: Eval and Treat TKA rehab (17) MEDICAL/REFERRING DIAGNOSIS: J67.185 History of knee replacement procedure L knee  History of knee replacement procedure of left knee [Z96.652]   DATE OF ONSET: 17  REFERRING PHYSICIAN: Tay Riojas MD  RETURN PHYSICIAN APPOINTMENT:17      UPDATE ASSESSMENT:  Mr. Angelica Almanzar presents 6 weeks s/p L TKA. He has minimal complaints of pain and has improved his L knee AROM to 0-115. Patient demonstrates improving gait mechanics but has mild limitations with knee extension and flexion actively when ambulating. Patient has reduced stance time on L LE when ambulating and needs continued PT for continued improvements with normalized gait mechanics and improved L quad activation.      Michaela Lewis, PT  2017

## 2017-07-03 ENCOUNTER — HOSPITAL ENCOUNTER (OUTPATIENT)
Dept: PHYSICAL THERAPY | Age: 67
Discharge: HOME | End: 2017-07-03
Payer: COMMERCIAL

## 2017-07-03 PROCEDURE — 97113 AQUATIC THERAPY/EXERCISES: CPT

## 2017-07-05 NOTE — PROGRESS NOTES
Blaire Hernandez  : 1950 [facility] VAIBHAV GOMEZ  [address] [facility], [address] [place] [address],[address], [place] [address].  [phone]   [phone]       OUTPATIENT PHYSICAL THERAPY:Daily Note 2017      ICD-10: Treatment Diagnosis: Pain in left knee (M25.562); Difficulty in walking, not elsewhere classified (R26.2); Presence of left artificial knee joint (F72.176)  Precautions/Allergies: post-op TKA precautions. Neosporin [hydrocortisone] and Pcn [penicillins]   Fall Risk Score: 2 (? 5 = High Risk)  MD Orders: Eval and Treat TKA rehab (17) MEDICAL/REFERRING DIAGNOSIS: D98.775 History of knee replacement procedure L knee  History of knee replacement procedure of left knee [Z96.652]   DATE OF ONSET: 17  REFERRING PHYSICIAN: Hobart Castleman, MD  RETURN PHYSICIAN APPOINTMENT:17     INITIAL ASSESSMENT:  Mr. Odalis Strauss presents 3 weeks s/p L TKA. He appears to be doing very well at this point. Pain is under control. The surgical wound appears to be healing well. The knee PROM is 5-95 deg flexion. He has good muscle activation, but some difficulty isolating the quads. Post-op healing, swelling, pain, decreased ROM and strength are limiting basic mobility and normal activity level. He will benefit from PT for guided post-op rehab to promote normalized return of motion, strength, and function in order for safe return to training as an [employer] . 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 PROBLEM LIST (Impacting functional limitations):  1. Post-op pain and swelling L knee  2. Decreased ROM L knee   3. Decreased functional strength L knee/LE   4. Decreased gait skills INTERVENTIONS PLANNED:  1. Thermal and electric modalities, manual therapies for pain. 2. Manual therapies and therapeutic exercises for ROM and strength.    3. Therapeutic exercises for gait and balance   TREATMENT PLAN:  Effective Dates: 2017 TO 9/6/2017. Frequency/Duration: 3 times a week for 4 weeks, then assess need for additional 4-8 weeks to progress strengthening toward discharge goals. GOALS: (Goals have been discussed and agreed upon with patient.)   Short-Term Functional Goals: Time Frame: 4-6 weeks*  1. Report no more than minimal, intermittent 3/10 pain L knee with basic walking and ADL's, and score greater than 40/80 on the LEFS. 2. L knee PROM is greater than or equal to 0-115 degrees flexion. 3. L quad strength is 5/5 with good terminal extension strength for stability with walking and progressing into strengthening phase. 4. Walking without limp on level surfaces and up stairs with reciprocal pattern. Discharge Goals: Time Frame: 10-12 weeks   1. No more than 3/10 pain L knee with all normalized daily home, work, and  conditioning training activities, and score greater than 55/80 on the LEFS. 2. Demonstrate good L knee and LE functional quad strength with good control walking down stairs, slopes  3. Able to balance with good control in single-leg stand greater than 15 seconds with eyes open, greater than 10 seconds with eyes closed for improved dynamic stability. 4. Independent with [facility] for advanced knee strengthening. Rehabilitation Potential For Stated Goals: Good              The information in this section was collected on 6-7-17 (except where otherwise noted). HISTORY:   History of Present Injury/Illness (Reason for Referral): Long history of L knee pain beginning with a sports injury when in high school, then again in the [facility]. Worsening of knee pain and swelling limiting his activities over recent years. Was treated with injections and synovial fluid replacement with limited effects. DJD diagnosed and TKA recommended. This was done 5-17-17. He was hospitalized 2 days with [address] [place] [address] rehab, then discharged home with home health PT for 2 weeks. Past Medical History/Comorbidities: s/p L TKA 5-17-17.  PMH from EMR includes:  Mr. Subha Panda  has a past medical history of Benign prostatic hyperplasia; CAD (coronary artery disease); Depression; Diastolic dysfunction; Elevated serum creatinine (04/24/2017); Former smoker; H/O echocardiogram (07/24/2014); Hypercholesteremia; Hypertension; Osteoarthritis; and Overactive bladder. He also has no past medical history of Adverse effect of anesthesia; Difficult intubation; Malignant hyperthermia due to anesthesia; Nausea & vomiting; or Pseudocholinesterase deficiency. Mr. Subha Panda  has a past surgical history that includes urological (2013) and colonoscopy. Social History/Living Environment: Lives with his wife. Home has a flight of stairs to his home office. Prior Level of Function/Work/Activity: Works in sales with frequent driving trips. Was active with walking 30 minutes daily and strength training with a  the past 4-5 years. 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 Current Medications:  Tylenol prn. Protonix and 2 anti-biotics for 2 weeks to finish 6/30/17. Other meds from EMR include:     Current Outpatient Prescriptions:     oxyCODONE IR (ROXICODONE) 10 mg tab immediate release tablet, Take 1 Tab by mouth every three (3) hours as needed. Max Daily Amount: 80 mg. (Patient taking differently: Take 1 Tab by mouth every three (3) hours as needed. Indications: Pain), Disp: 90 Tab, Rfl: 0    acetaminophen (TYLENOL) 500 mg tablet, Take 2 Tabs by mouth every six (6) hours. (Patient taking differently: Take 2 Tabs by mouth every six (6) hours. Indications: Fever, Pain), Disp: 120 Tab, Rfl: 0    aspirin 81 mg chewable tablet, Take 1 Tab by mouth two (2) times a day., Disp: 60 Tab, Rfl: 0    cyclobenzaprine (FLEXERIL) 10 mg tablet, Take 0.5 Tabs by mouth three (3) times daily. , Disp: 60 Tab, Rfl: 0    gabapentin (NEURONTIN) 100 mg capsule, Take 1 Cap by mouth two (2) times a day., Disp: 60 Cap, Rfl: 0    HYDROmorphone (DILAUDID) 2 mg tablet, Take 1 Tab by mouth every four (4) hours as needed. Max Daily Amount: 12 mg., Disp: 90 Tab, Rfl: 0    senna-docusate (PERICOLACE) 8.6-50 mg per tablet, Take 2 Tabs by mouth daily. , Disp: 120 Tab, Rfl: 0    zolpidem (AMBIEN) 5 mg tablet, Take 1 Tab by mouth nightly as needed for Sleep. Max Daily Amount: 5 mg., Disp: 30 Tab, Rfl: 0    metoprolol tartrate (LOPRESSOR) 25 mg tablet, Take 25 mg by mouth two (2) times a day. Take DOS per anesthesia protocol., Disp: , Rfl:     sulindac (CLINORIL) 150 mg tablet, Take 150 mg by mouth two (2) times a day. Non-formulary. Patient instructed to bring to the hospital on the DOS, in the original bottle, and give to nurse., Disp: , Rfl:     olopatadine (PATANASE) 0.6 % spry, 2 Squirts by Both Nostrils route two (2) times a day. Non-formulary. Patient instructed to bring to the hospital on the DOS, in the original bottle, and give to nurse., Disp: , Rfl:     mirabegron ER (MYRBETRIQ) 50 mg ER tablet, Take 50 mg by mouth nightly. Non-formulary. Patient instructed to bring to the hospital on the DOS, in the original bottle, and give to nurse., Disp: , Rfl:     atorvastatin (LIPITOR) 40 mg tablet, Take 40 mg by mouth nightly., Disp: , Rfl:     lisinopril (PRINIVIL, ZESTRIL) 10 mg tablet, Take 10 mg by mouth daily. , Disp: , Rfl:     isosorbide mononitrate ER (IMDUR) 30 mg tablet, Take 30 mg by mouth daily. Take DOS per anesthesia protocol., Disp: , Rfl:     ezetimibe (ZETIA) 10 mg tablet, Take 10 mg by mouth daily. , Disp: , Rfl:     fenofibrate (LOFIBRA) 160 mg tablet, Take 160 mg by mouth daily. , Disp: , Rfl:     FLUoxetine (PROZAC) 20 mg capsule, Take 20 mg by mouth daily.  Take DOS per anesthesia protocol., Disp: , Rfl:    Date Last Reviewed:  7-3-17      Number of Personal Factors/Comorbidities that affect the Plan of Care: 1-2: MODERATE COMPLEXITY   EXAMINATION:   7/5/2017  Observation/Orthostatic Postural Assessment: Walks into clinic without assistive device, very slight limp on L LE  Palpation: Tender to L distal quads, posterior knee. ROM:            Strength:      R Hip and Knee grossly 5/5 throughout. Functional Mobility: Walking on level ground: Good knee mechanics on L, mildly wide step width and mild decreased trunk control noted, but improved with conscious correction. Stair walking: able to walk up/down stairs with reciprocal pattern, mild limp on L stance with descent more than ascent; uses bilat rails for UE support. Balance: Not assessed. Body Structures Involved:  1. Joints  2. Muscles Body Functions Affected:  1. Neuromusculoskeletal  2. Movement Related Activities and Participation Affected:  1. General Tasks and Demands  2. Mobility   Number of elements (examined above) that affect the Plan of Care: 4+: HIGH COMPLEXITY   CLINICAL PRESENTATION:   Presentation: Stable and uncomplicated: LOW COMPLEXITY   CLINICAL DECISION MAKING:   Outcome Measure: Tool Used: Lower Extremity Functional Scale (LEFS)  Score:  Initial: 26/80 Most Recent: X/80 (Date: -- )   Interpretation of Score: 20 questions each scored on a 5 point scale with 0 representing \"extreme difficulty or unable to perform\" and 4 representing \"no difficulty\". The lower the score, the greater the functional disability. 80/80 represents no disability. Minimal detectable change is 9 points. Medical Necessity:   · Patient is expected to demonstrate progress in strength, range of motion and balance to return to independent basic mobility and progress to return to his recreational activity. .  Reason for Services/Other Comments:  · Patient continues to require skilled intervention due to the complexity of the surgical procedure, and need for safe, progressive rehab to return to his normal activity at home, work, and recreation actiivitie.    Use of outcome tool(s) and clinical judgement create a POC that gives a: Clear prediction of patient's progress: LOW COMPLEXITY          TREATMENT:   (In addition to Assessment/Re-Assessment sessions the following treatments were rendered)  7/5/2017  Pre-treatment Symptoms/Complaints:  Pt stated that he is improving and wants to be able to bend his knee 120 degrees in a week. Pain: Initial:   0-1/10 Post Session: No significant increase in pain reported. Aquatic Therapy 0 minutes): Not performed this date. Aquatic Exercise Log       Date  6/26 Date  6/28 Date  7/3/17 Date   Date     Activity/ Exercise Parameters Parameters Parameters Parameters Parameters   Walking forward 6 6 6     Walking backward 6 6 6     Walking sideways 6 6 6       Marching 6 6 6       Goose Step 6 6 6       Tip toes 3 3 3       Heels 3 3 3       Lunges  6 6     Side step squats        LE Exercises 2# 3# 3#       Hip Flex/Ext 12 with noodle 12 with noodle 12        Hip Abd/Add 12 with noodle 12 12       Hip IR/ER          Calf raises 12 12 12       Knee Flex 12 12 12       Squats  12 12       Leg Circles 10/10 10/10 10/10       Step Ups 12 12 12     UE Exercises          Squeeze In          Push Down          Pull Down          Bicep/Tricep        Rows/Press outs         Chi Positions          Trunk Rotation        Deep H2O/ Noodles 7' with noodle and 2# 7' with noodle 3# 7/ with 3# using noodle       Stabilization          Arms only          Legs only  jog 2 min Jog 2 min 2 min     Cross   Country 2 min 2 min 2 min2       Scissors 2 min 2 min  min       Crab walk Knee ext kicks  2 min Knee ext 2 min Knee kicking 2 min     Lower abdominal   work           Cardio          Jogging        Lap   Swimming          Stretches          Hamstrings  On step 3 x 20 On step 3 x 20       Heelcords          flexion  On step 3 x 20 On step 3 x 20       Neck          Therapeutic Exercise: ( ):  Exercises per grid below to improve mobility, strength and L knee ROM. Required minimal verbal, manual and tactile cues to promote proper body mechanics.   Progressed range and repetitions as indicated. Date:  6/30/17 Date:   Date:     Activity/Exercise Parameters Parameters Parameters   Quad sets 2 x 15     Short arc quads 2 x 15     Seated hamstring curls Green; 2 x 15     Heel slides 1 x 20     Straight leg raise 2 x 10     Long arc quad  2x15     Slantboard calf stretch 3 x 30\"     Step ups 4\", 2 x 10     Manual therapy (20 minutes): Grade III-IV L knee extension mobilizations in supine and flexion mobilizations in sitting for improved L knee ROM; Grade III-IV patellar mobilizations inferior/superior for improved L knee ROM; scar massage to minimize soft tissue restrictions to ROM  Treatment/Session Assessment:    · Response to Treatment: Pt demonstrates improving L knee ROM and tolerated therapeutic exercises well. Patient making excellent progress with his rehab. · Compliance with Program/Exercises: Appears compliant. · Recommendations/Intent for next treatment session: We will continue with pain and swelling control, knee motion treatment, and progressive isolated and functional strengthening. We will continue with Aquatic Therapy for open and closed chain knee exercises in a reduced joint weight loading environment and clinic treatments for motion and functional strengthening.    Total Treatment Duration: 45 Minutes  PT Patient Time In/Time Out  Time In: 1230  Time Out: [id number]     Eldon Frederick, PT

## 2017-07-06 ENCOUNTER — HOSPITAL ENCOUNTER (OUTPATIENT)
Dept: PHYSICAL THERAPY | Age: 67
Discharge: HOME | End: 2017-07-06
Payer: COMMERCIAL

## 2017-07-06 PROCEDURE — 97140 MANUAL THERAPY 1/> REGIONS: CPT

## 2017-07-06 PROCEDURE — 97110 THERAPEUTIC EXERCISES: CPT

## 2017-07-06 NOTE — PROGRESS NOTES
Caryle Friar  : 1950 [facility]OME[facility]  [address] [facility], [address] [place] [address],[address], [place] [address].  [phone]   [phone]       OUTPATIENT PHYSICAL THERAPY:Daily Note and Progress Report 2017      ICD-10: Treatment Diagnosis: Pain in left knee (M25.562); Difficulty in walking, not elsewhere classified (R26.2); Presence of left artificial knee joint (F86.953)  Precautions/Allergies: post-op TKA precautions. Neosporin [hydrocortisone] and Pcn [penicillins]   Fall Risk Score: 2 (? 5 = High Risk)  MD Orders: Eval and Treat TKA rehab (17) MEDICAL/REFERRING DIAGNOSIS: M19.295 History of knee replacement procedure L knee  History of knee replacement procedure of left knee [Z96.652]   DATE OF ONSET: 17  REFERRING PHYSICIAN: Mikey Fleischer, MD  RETURN PHYSICIAN APPOINTMENT: 17     INITIAL ASSESSMENT:  Mr. Sarah Carey is 7 weeks s/p L TKA. He is making significant progress with his rehabilitation, and reports little to no pain. He has improved his ROM and strength since initial evaluation. Patient will benefit from continued PT to normalized his gait mechanics, improve his L knee ROM and facilitate continued improvement with participation in recreational and vocational activities. PROBLEM LIST (Impacting functional limitations):  1. Post-op pain and swelling L knee  2. Decreased ROM L knee   3. Decreased functional strength L knee/LE   4. Decreased gait skills INTERVENTIONS PLANNED:  1. Thermal and electric modalities, manual therapies for pain. 2. Manual therapies and therapeutic exercises for ROM and strength. 3. Therapeutic exercises for gait and balance   TREATMENT PLAN:  Effective Dates: 2017 TO 2017. Frequency/Duration: 3 times a week for 4 weeks, then assess need for additional 4-8 weeks to progress strengthening toward discharge goals.   GOALS: (Goals have been discussed and agreed upon with patient.)   Short-Term Functional Goals: Time Frame: 4-6 weeks*  1. Report no more than minimal, intermittent 3/10 pain L knee with basic walking and ADL's, and score greater than 40/80 on the LEFS. MET 7/6/17  2. L knee PROM is greater than or equal to 0-115 degrees flexion. MET 7-6-17  3. L quad strength is 5/5 with good terminal extension strength for stability with walking and progressing into strengthening phase. MET 7-6-17  4. Walking without limp on level surfaces and up stairs with reciprocal pattern. Ongoing 7-6-17  Discharge Goals: Time Frame: 10-12 weeks   1. No more than 3/10 pain L knee with all normalized daily home, work, and  conditioning training activities, and score greater than 55/80 on the LEFS. 2. Demonstrate good L knee and LE functional quad strength with good control walking down stairs, slopes  3. Able to balance with good control in single-leg stand greater than 15 seconds with eyes open, greater than 10 seconds with eyes closed for improved dynamic stability. 4. Independent with [facility] for advanced knee strengthening. Rehabilitation Potential For Stated Goals: Good  Regarding Sandy Rousseau's therapy, I certify that the treatment plan above will be carried out by a therapist or under their direction. Thank you for this referral,    John Olivares PT         Referring Physician Signature:       Tamra Ramirez MD            Date                      The information in this section was collected on 6-7-17 (except where otherwise noted). HISTORY:   History of Present Injury/Illness (Reason for Referral): Long history of L knee pain beginning with a sports injury when in high school, then again in the [facility]. Worsening of knee pain and swelling limiting his activities over recent years. Was treated with injections and synovial fluid replacement with limited effects. DJD diagnosed and TKA recommended. This was done 5-17-17. He was hospitalized 2 days with [address] [place] [address] rehab, then discharged home with home health PT for 2 weeks. Past Medical History/Comorbidities: s/p L TKA 5-17-17. PMH from EMR includes:  Mr. Collette Ponto  has a past medical history of Benign prostatic hyperplasia; CAD (coronary artery disease); Depression; Diastolic dysfunction; Elevated serum creatinine (04/24/2017); Former smoker; H/O echocardiogram (07/24/2014); Hypercholesteremia; Hypertension; Osteoarthritis; and Overactive bladder. He also has no past medical history of Adverse effect of anesthesia; Difficult intubation; Malignant hyperthermia due to anesthesia; Nausea & vomiting; or Pseudocholinesterase deficiency. Mr. Collette Ponto  has a past surgical history that includes urological (2013) and colonoscopy. Social History/Living Environment: Lives with his wife. Home has a flight of stairs to his home office. Prior Level of Function/Work/Activity: Works in sales with frequent driving trips. Was active with walking 30 minutes daily and strength training with a  the past 4-5 years. 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 Current Medications:  Tylenol prn. Protonix and 2 anti-biotics for 2 weeks to finish 6/30/17. Other meds from EMR include:     Current Outpatient Prescriptions:     acetaminophen (TYLENOL) 500 mg tablet, Take 2 Tabs by mouth every six (6) hours. (Patient taking differently: Take 2 Tabs by mouth every six (6) hours. Indications: Fever, Pain), Disp: 120 Tab, Rfl: 0    aspirin 81 mg chewable tablet, Take 1 Tab by mouth two (2) times a day., Disp: 60 Tab, Rfl: 0    cyclobenzaprine (FLEXERIL) 10 mg tablet, Take 0.5 Tabs by mouth three (3) times daily. , Disp: 60 Tab, Rfl: 0    senna-docusate (PERICOLACE) 8.6-50 mg per tablet, Take 2 Tabs by mouth daily. , Disp: 120 Tab, Rfl: 0    zolpidem (AMBIEN) 5 mg tablet, Take 1 Tab by mouth nightly as needed for Sleep. Max Daily Amount: 5 mg., Disp: 30 Tab, Rfl: 0    metoprolol tartrate (LOPRESSOR) 25 mg tablet, Take 25 mg by mouth two (2) times a day.  Take DOS per anesthesia protocol., Disp: , Rfl:     sulindac (CLINORIL) 150 mg tablet, Take 150 mg by mouth two (2) times a day. Non-formulary. Patient instructed to bring to the hospital on the DOS, in the original bottle, and give to nurse., Disp: , Rfl:     olopatadine (PATANASE) 0.6 % spry, 2 Squirts by Both Nostrils route two (2) times a day. Non-formulary. Patient instructed to bring to the hospital on the DOS, in the original bottle, and give to nurse., Disp: , Rfl:     mirabegron ER (MYRBETRIQ) 50 mg ER tablet, Take 50 mg by mouth nightly. Non-formulary. Patient instructed to bring to the hospital on the DOS, in the original bottle, and give to nurse., Disp: , Rfl:     atorvastatin (LIPITOR) 40 mg tablet, Take 40 mg by mouth nightly., Disp: , Rfl:     lisinopril (PRINIVIL, ZESTRIL) 10 mg tablet, Take 10 mg by mouth daily. , Disp: , Rfl:     isosorbide mononitrate ER (IMDUR) 30 mg tablet, Take 30 mg by mouth daily. Take DOS per anesthesia protocol., Disp: , Rfl:     ezetimibe (ZETIA) 10 mg tablet, Take 10 mg by mouth daily. , Disp: , Rfl:     fenofibrate (LOFIBRA) 160 mg tablet, Take 160 mg by mouth daily. , Disp: , Rfl:     FLUoxetine (PROZAC) 20 mg capsule, Take 20 mg by mouth daily. Take DOS per anesthesia protocol., Disp: , Rfl:    Date Last Reviewed:  7-6-17      Number of Personal Factors/Comorbidities that affect the Plan of Care: 1-2: MODERATE COMPLEXITY   EXAMINATION:   7/6/2017  Observation/Orthostatic Postural Assessment: Ambulating with midly antalgic gait pattern with reduced stance time on L LE, no assistive device. ROM:    L knee PROM 0-118, L knee AROM 0-110        Strength:    L quadriceps strength 5/5  R Hip and Knee grossly 5/5 throughout. Functional Mobility: Walking on level ground: Good knee mechanics on L, mildly wide step width and mild decreased trunk control noted, but improved with conscious correction.  Stair walking: able to walk up/down stairs with reciprocal pattern, mild limp on L stance with descent more than ascent; uses bilat rails for UE support. Balance: Not assessed. Body Structures Involved:  1. Joints  2. Muscles Body Functions Affected:  1. Neuromusculoskeletal  2. Movement Related Activities and Participation Affected:  1. General Tasks and Demands  2. Mobility   Number of elements (examined above) that affect the Plan of Care: 4+: HIGH COMPLEXITY   CLINICAL PRESENTATION:   Presentation: Stable and uncomplicated: LOW COMPLEXITY   CLINICAL DECISION MAKING:   Outcome Measure: Tool Used: Lower Extremity Functional Scale (LEFS)  Score:  Initial: 26/80 Most Recent: 57/80 (Date: 7-6-17 )   Interpretation of Score: 20 questions each scored on a 5 point scale with 0 representing \"extreme difficulty or unable to perform\" and 4 representing \"no difficulty\". The lower the score, the greater the functional disability. 80/80 represents no disability. Minimal detectable change is 9 points. Medical Necessity:   · Patient is expected to demonstrate progress in strength, range of motion and balance to return to independent basic mobility and progress to return to his recreational activity. .  Reason for Services/Other Comments:  · Patient continues to require skilled intervention due to the complexity of the surgical procedure, and need for safe, progressive rehab to return to his normal activity at home, work, and recreation actiivitie. Use of outcome tool(s) and clinical judgement create a POC that gives a: Clear prediction of patient's progress: LOW COMPLEXITY          TREATMENT:   (In addition to Assessment/Re-Assessment sessions the following treatments were rendered)  7/6/2017  Pre-treatment Symptoms/Complaints:  Pt stated that he has been walking often and has been doing well with it. Has no complaints and stated that he rarely has pain. Pain: Initial:   0 out of 10 Post Session: No significant increase in pain reported. Aquatic Therapy 0 minutes):  Not performed this date. Aquatic Exercise Log     Date  6/26 Date  6/28 Date  7/3/17 Date   Date     Activity/ Exercise Parameters Parameters Parameters Parameters Parameters   Walking forward 6 6 6     Walking backward 6 6 6     Walking sideways 6 6 6       Marching 6 6 6       Goose Step 6 6 6       Tip toes 3 3 3       Heels 3 3 3       Lunges  6 6     Side step squats        LE Exercises 2# 3# 3#       Hip Flex/Ext 12 with noodle 12 with noodle 12        Hip Abd/Add 12 with noodle 12 12       Hip IR/ER          Calf raises 12 12 12       Knee Flex 12 12 12       Squats  12 12       Leg Circles 10/10 10/10 10/10       Step Ups 12 12 12     UE Exercises          Squeeze In          Push Down          Pull Down          Bicep/Tricep        Rows/Press outs         Chi Positions          Trunk Rotation        Deep H2O/ Noodles 7' with noodle and 2# 7' with noodle 3# 7/ with 3# using noodle       Stabilization          Arms only          Legs only  jog 2 min Jog 2 min 2 min     Cross   Country 2 min 2 min 2 min2       Scissors 2 min 2 min  min       Crab walk Knee ext kicks  2 min Knee ext 2 min Knee kicking 2 min     Lower abdominal   work           Cardio          Jogging        Lap   Swimming          Stretches          Hamstrings  On step 3 x 20 On step 3 x 20       Heelcords          flexion  On step 3 x 20 On step 3 x 20       Neck          Therapeutic Exercise: ( 28 minutes):  Exercises per grid below to improve mobility, strength and L knee ROM. Required minimal verbal, manual and tactile cues to promote proper body mechanics. Progressed range and repetitions as indicated. Pt concluded PT with 10 minutes on stationary bicycle for improved L knee ROM.      Date:  6/30/17 Date:  7/6/17 Date:     Activity/Exercise Parameters Parameters Parameters   Quad sets 2 x 15 1 x 20    Short arc quads 2 x 15 2 x 15 2#    Seated hamstring curls Green; 2 x 15 Blue 2 x 10    Heel slides 1 x 20 1 x 20    Straight leg raise 2 x 10 2 x 10    Long arc quad  2x15     Slantboard calf stretch 3 x 30\"     Step ups 4\", 2 x 10     Side-lying hip ABD  2 x 10    Standing HS curls  2 x 10    Manual therapy (20 minutes): Grade III-IV L knee extension mobilizations in supine and flexion mobilizations in sitting for improved L knee ROM; Grade III-IV patellar mobilizations inferior/superior for improved L knee ROM; scar massage to minimize soft tissue restrictions which could be a hindrance to ROM. Treatment/Session Assessment:    · Response to Treatment: Pt improving with L knee ROM and L LE strength. Patient also improving with gait performance with reduced limp. · Compliance with Program/Exercises: Appears compliant. · Recommendations/Intent for next treatment session: We will continue with pain and swelling control, knee motion treatment, and progressive isolated and functional strengthening. We will continue with Aquatic Therapy for open and closed chain knee exercises in a reduced joint weight loading environment and clinic treatments for motion and functional strengthening.    Total Treatment Duration: 48 Minutes (10 minutes untimed on stationary bicycle)  PT Patient Time In/Time Out  Time In: 1340  Time Out: One Dorian Smith, PT

## 2017-07-10 ENCOUNTER — HOSPITAL ENCOUNTER (OUTPATIENT)
Dept: PHYSICAL THERAPY | Age: 67
Discharge: HOME | End: 2017-07-10
Payer: COMMERCIAL

## 2017-07-10 PROCEDURE — 97113 AQUATIC THERAPY/EXERCISES: CPT

## 2017-07-10 NOTE — PROGRESS NOTES
Sarah Sloan  : 1950 [facility] VAIBHAV GOMEZ  [address] [place] [address], [facility] [address]  [phone]   [phone]       OUTPATIENT PHYSICAL THERAPY:Daily Note 7/10/2017      ICD-10: Treatment Diagnosis: Pain in left knee (M25.562); Difficulty in walking, not elsewhere classified (R26.2); Presence of left artificial knee joint (V66.993)  Precautions/Allergies: post-op TKA precautions. Neosporin [hydrocortisone] and Pcn [penicillins]   Fall Risk Score: 2 (? 5 = High Risk)  MD Orders: Eval and Treat TKA rehab (17) MEDICAL/REFERRING DIAGNOSIS: F41.287 History of knee replacement procedure L knee  History of knee replacement procedure of left knee [Z96.652]   DATE OF ONSET: 17  REFERRING PHYSICIAN: Yasmin Gray MD  RETURN PHYSICIAN APPOINTMENT: 17     INITIAL ASSESSMENT:  Mr. Asha Gatica is 7 weeks s/p L TKA. He is making significant progress with his rehabilitation, and reports little to no pain. He has improved his ROM and strength since initial evaluation. Patient will benefit from continued PT to normalized his gait mechanics, improve his L knee ROM and facilitate continued improvement with participation in recreational and vocational activities. PROBLEM LIST (Impacting functional limitations):  1. Post-op pain and swelling L knee  2. Decreased ROM L knee   3. Decreased functional strength L knee/LE   4. Decreased gait skills INTERVENTIONS PLANNED:  1. Thermal and electric modalities, manual therapies for pain. 2. Manual therapies and therapeutic exercises for ROM and strength. 3. Therapeutic exercises for gait and balance   TREATMENT PLAN:  Effective Dates: 2017 TO 2017. Frequency/Duration: 3 times a week for 4 weeks, then assess need for additional 4-8 weeks to progress strengthening toward discharge goals. GOALS: (Goals have been discussed and agreed upon with patient.)   Short-Term Functional Goals: Time Frame: 4-6 weeks*  1.  Report no more than minimal, intermittent 3/10 pain L knee with basic walking and ADL's, and score greater than 40/80 on the LEFS. MET 7/6/17  2. L knee PROM is greater than or equal to 0-115 degrees flexion. MET 7-6-17  3. L quad strength is 5/5 with good terminal extension strength for stability with walking and progressing into strengthening phase. MET 7-6-17  4. Walking without limp on level surfaces and up stairs with reciprocal pattern. Ongoing 7-6-17  Discharge Goals: Time Frame: 10-12 weeks   1. No more than 3/10 pain L knee with all normalized daily home, work, and  conditioning training activities, and score greater than 55/80 on the LEFS. 2. Demonstrate good L knee and LE functional quad strength with good control walking down stairs, slopes  3. Able to balance with good control in single-leg stand greater than 15 seconds with eyes open, greater than 10 seconds with eyes closed for improved dynamic stability. 4. Independent with HEP for advanced knee strengthening. Rehabilitation Potential For Stated Goals: Good  Regarding Soco Rousseau's therapy, I certify that the treatment plan above will be carried out by a therapist or under their direction. Thank you for this referral,    Yahaira Walker, PT         Referring Physician Signature:       Richard Cunningham MD            Date                      The information in this section was collected on 6-7-17 (except where otherwise noted). HISTORY:   History of Present Injury/Illness (Reason for Referral): Long history of L knee pain beginning with a sports injury when in high school, then again in the [facility]. Worsening of knee pain and swelling limiting his activities over recent years. Was treated with injections and synovial fluid replacement with limited effects. DJD diagnosed and TKA recommended. This was done 5-17-17. He was hospitalized 2 days with [address] [place] [address] rehab, then discharged home with home health PT for 2 weeks.    Past Medical History/Comorbidities: s/p L TKA 5-17-17. PMH from EMR includes:  Mr. Shani Duong  has a past medical history of Benign prostatic hyperplasia; CAD (coronary artery disease); Depression; Diastolic dysfunction; Elevated serum creatinine (04/24/2017); Former smoker; H/O echocardiogram (07/24/2014); Hypercholesteremia; Hypertension; Osteoarthritis; and Overactive bladder. He also has no past medical history of Adverse effect of anesthesia; Difficult intubation; Malignant hyperthermia due to anesthesia; Nausea & vomiting; or Pseudocholinesterase deficiency. Mr. Shani Duong  has a past surgical history that includes urological (2013) and colonoscopy. Social History/Living Environment: Lives with his wife. Home has a flight of stairs to his home office. Prior Level of Function/Work/Activity: Works in sales with frequent driving trips. Was active with walking 30 minutes daily and strength training with a  the past 4-5 years. Active with hunting, and works as a guide in [place]. 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 Current Medications:  Tylenol prn. Protonix and 2 anti-biotics for 2 weeks to finish 6/30/17. Other meds from EMR include:     Current Outpatient Prescriptions:     acetaminophen (TYLENOL) 500 mg tablet, Take 2 Tabs by mouth every six (6) hours. (Patient taking differently: Take 2 Tabs by mouth every six (6) hours. Indications: Fever, Pain), Disp: 120 Tab, Rfl: 0    aspirin 81 mg chewable tablet, Take 1 Tab by mouth two (2) times a day., Disp: 60 Tab, Rfl: 0    cyclobenzaprine (FLEXERIL) 10 mg tablet, Take 0.5 Tabs by mouth three (3) times daily. , Disp: 60 Tab, Rfl: 0    senna-docusate (PERICOLACE) 8.6-50 mg per tablet, Take 2 Tabs by mouth daily. , Disp: 120 Tab, Rfl: 0    zolpidem (AMBIEN) 5 mg tablet, Take 1 Tab by mouth nightly as needed for Sleep. Max Daily Amount: 5 mg., Disp: 30 Tab, Rfl: 0    metoprolol tartrate (LOPRESSOR) 25 mg tablet, Take 25 mg by mouth two (2) times a day.  Take DOS per anesthesia protocol., Disp: , Rfl:     sulindac (CLINORIL) 150 mg tablet, Take 150 mg by mouth two (2) times a day. Non-formulary. Patient instructed to bring to the hospital on the DOS, in the original bottle, and give to nurse., Disp: , Rfl:     olopatadine (PATANASE) 0.6 % spry, 2 Squirts by Both Nostrils route two (2) times a day. Non-formulary. Patient instructed to bring to the hospital on the DOS, in the original bottle, and give to nurse., Disp: , Rfl:     mirabegron ER (MYRBETRIQ) 50 mg ER tablet, Take 50 mg by mouth nightly. Non-formulary. Patient instructed to bring to the hospital on the DOS, in the original bottle, and give to nurse., Disp: , Rfl:     atorvastatin (LIPITOR) 40 mg tablet, Take 40 mg by mouth nightly., Disp: , Rfl:     lisinopril (PRINIVIL, ZESTRIL) 10 mg tablet, Take 10 mg by mouth daily. , Disp: , Rfl:     isosorbide mononitrate ER (IMDUR) 30 mg tablet, Take 30 mg by mouth daily. Take DOS per anesthesia protocol., Disp: , Rfl:     ezetimibe (ZETIA) 10 mg tablet, Take 10 mg by mouth daily. , Disp: , Rfl:     fenofibrate (LOFIBRA) 160 mg tablet, Take 160 mg by mouth daily. , Disp: , Rfl:     FLUoxetine (PROZAC) 20 mg capsule, Take 20 mg by mouth daily. Take DOS per anesthesia protocol., Disp: , Rfl:    Date Last Reviewed:  7-6-17      Number of Personal Factors/Comorbidities that affect the Plan of Care: 1-2: MODERATE COMPLEXITY   EXAMINATION:   7/10/2017  Observation/Orthostatic Postural Assessment: Ambulating with midly antalgic gait pattern with reduced stance time on L LE, no assistive device. ROM:    L knee PROM 0-118, L knee AROM 0-110        Strength:    L quadriceps strength 5/5  R Hip and Knee grossly 5/5 throughout. Functional Mobility: Walking on level ground: Good knee mechanics on L, mildly wide step width and mild decreased trunk control noted, but improved with conscious correction.  Stair walking: able to walk up/down stairs with reciprocal pattern, mild limp on L stance with descent more than ascent; uses bilat rails for UE support. Balance: Not assessed. Body Structures Involved:  1. Joints  2. Muscles Body Functions Affected:  1. Neuromusculoskeletal  2. Movement Related Activities and Participation Affected:  1. General Tasks and Demands  2. Mobility   Number of elements (examined above) that affect the Plan of Care: 4+: HIGH COMPLEXITY   CLINICAL PRESENTATION:   Presentation: Stable and uncomplicated: LOW COMPLEXITY   CLINICAL DECISION MAKING:   Outcome Measure: Tool Used: Lower Extremity Functional Scale (LEFS)  Score:  Initial: 26/80 Most Recent: 57/80 (Date: 7-6-17 )   Interpretation of Score: 20 questions each scored on a 5 point scale with 0 representing \"extreme difficulty or unable to perform\" and 4 representing \"no difficulty\". The lower the score, the greater the functional disability. 80/80 represents no disability. Minimal detectable change is 9 points. Medical Necessity:   · Patient is expected to demonstrate progress in strength, range of motion and balance to return to independent basic mobility and progress to return to his recreational activity. .  Reason for Services/Other Comments:  · Patient continues to require skilled intervention due to the complexity of the surgical procedure, and need for safe, progressive rehab to return to his normal activity at home, work, and recreation actiivitie. Use of outcome tool(s) and clinical judgement create a POC that gives a: Clear prediction of patient's progress: LOW COMPLEXITY          TREATMENT:   (In addition to Assessment/Re-Assessment sessions the following treatments were rendered)  7/10/2017  Pre-treatment Symptoms/Complaints:  Pt continues to do well. He is gradually increasing his walking. Pain: Initial:   0 out of 10 Post Session: No significant increase in pain reported. Aquatic Therapy 0 minutes): Not performed this date.     Aquatic Exercise Log     Date  6/26 Date  6/28 Date  7/3/17 Date  7/11/17 Date     Activity/ Exercise Parameters Parameters Parameters Parameters Parameters   Walking forward 6 6 6 6    Walking backward 6 6 6 6    Walking sideways 6 6 6 6      Marching 6 6 6 6      Goose Step 6 6 6 6      Tip toes 3 3 3 3      Heels 3 3 3 3      Lunges  6 6 6    Side step squats        LE Exercises 2# 3# 3# 3#      Hip Flex/Ext 12 with noodle 12 with noodle 12  15      Hip Abd/Add 12 with noodle 12 12 15      Hip IR/ER          Calf raises 12 12 12 15      Knee Flex 12 12 12 15      Squats  12 12 15      Leg Circles 10/10 10/10 10/10 15/15      Step Ups 12 12 12 15    UE Exercises          Squeeze In          Push Down          Pull Down          Bicep/Tricep        Rows/Press outs         Chi Positions          Trunk Rotation        Deep H2O/ Noodles 7' with noodle and 2# 7' with noodle 3# 7/ with 3# using noodle 7' with 3# using noodle      Stabilization          Arms only          Legs only  jog 2 min Jog 2 min 2 min 3 min    Cross   Country 2 min 2 min 2 min2 3 min      Scissors 2 min 2 min  min 2 min      Crab walk Knee ext kicks  2 min Knee ext 2 min Knee kicking 2 min Kicking 2 min    Lower abdominal   work           Cardio          Jogging        Lap   Swimming          Stretches          Hamstrings  On step 3 x 20 On step 3 x 20       Heelcords          flexion  On step 3 x 20 On step 3 x 20       Neck          Therapeutic Exercise: ( 28 minutes):  Exercises per grid below to improve mobility, strength and L knee ROM. Required minimal verbal, manual and tactile cues to promote proper body mechanics. Progressed range and repetitions as indicated. Pt concluded PT with 10 minutes on stationary bicycle for improved L knee ROM.      Date:  6/30/17 Date:  7/6/17 Date:     Activity/Exercise Parameters Parameters Parameters   Quad sets 2 x 15 1 x 20    Short arc quads 2 x 15 2 x 15 2#    Seated hamstring curls Green; 2 x 15 Blue 2 x 10    Heel slides 1 x 20 1 x 20 Straight leg raise 2 x 10 2 x 10    Long arc quad  2x15     Slantboard calf stretch 3 x 30\"     Step ups 4\", 2 x 10     Side-lying hip ABD  2 x 10    Standing HS curls  2 x 10    Manual therapy (20 minutes): Grade III-IV L knee extension mobilizations in supine and flexion mobilizations in sitting for improved L knee ROM; Grade III-IV patellar mobilizations inferior/superior for improved L knee ROM; scar massage to minimize soft tissue restrictions which could be a hindrance to ROM. Treatment/Session Assessment:    · Response to Treatment: Pt improving with L knee ROM and L LE strength. · Compliance with Program/Exercises: Appears compliant. · Recommendations/Intent for next treatment session: We will continue with pain and swelling control, knee motion treatment, and progressive isolated and functional strengthening. We will continue with Aquatic Therapy for open and closed chain knee exercises in a reduced joint weight loading environment and clinic treatments for motion and functional strengthening.    Total Treatment Duration: 45 minutes  PT Patient Time In/Time Out  Time In: 1100  Time Out: [facility], PT

## 2017-07-12 ENCOUNTER — HOSPITAL ENCOUNTER (OUTPATIENT)
Dept: PHYSICAL THERAPY | Age: 67
Discharge: HOME | End: 2017-07-12
Payer: COMMERCIAL

## 2017-07-12 PROCEDURE — 97113 AQUATIC THERAPY/EXERCISES: CPT

## 2017-07-12 NOTE — PROGRESS NOTES
Mary Cohen  : 1950 [facility] VAIBHAV GOMEZ  [address] [facility], [address] [place] [address] [address], [address] [facility]  [phone]   [phone]       OUTPATIENT PHYSICAL THERAPY:Daily Note 2017      ICD-10: Treatment Diagnosis: Pain in left knee (M25.562); Difficulty in walking, not elsewhere classified (R26.2); Presence of left artificial knee joint (M15.633)  Precautions/Allergies: post-op TKA precautions. Neosporin [hydrocortisone] and Pcn [penicillins]   Fall Risk Score: 2 (? 5 = High Risk)  MD Orders: Eval and Treat TKA rehab (17) MEDICAL/REFERRING DIAGNOSIS: C96.946 History of knee replacement procedure L knee  History of knee replacement procedure of left knee [Z96.652]   DATE OF ONSET: 17  REFERRING PHYSICIAN: Millicent Cooks, MD  RETURN PHYSICIAN APPOINTMENT: 17     INITIAL ASSESSMENT:  Mr. Mayank Eaton is 7 weeks s/p L TKA. He is making significant progress with his rehabilitation, and reports little to no pain. He has improved his ROM and strength since initial evaluation. Patient will benefit from continued PT to normalized his gait mechanics, improve his L knee ROM and facilitate continued improvement with participation in recreational and vocational activities. PROBLEM LIST (Impacting functional limitations):  1. Post-op pain and swelling L knee  2. Decreased ROM L knee   3. Decreased functional strength L knee/LE   4. Decreased gait skills INTERVENTIONS PLANNED:  1. Thermal and electric modalities, manual therapies for pain. 2. Manual therapies and therapeutic exercises for ROM and strength. 3. Therapeutic exercises for gait and balance   TREATMENT PLAN:  Effective Dates: 2017 TO 2017. Frequency/Duration: 3 times a week for 4 weeks, then assess need for additional 4-8 weeks to progress strengthening toward discharge goals. GOALS: (Goals have been discussed and agreed upon with patient.)   Short-Term Functional Goals: Time Frame: 4-6 weeks*  1.  Report no more than minimal, intermittent 3/10 pain L knee with basic walking and ADL's, and score greater than 40/80 on the LEFS. MET 7/6/17  2. L knee PROM is greater than or equal to 0-115 degrees flexion. MET 7-6-17  3. L quad strength is 5/5 with good terminal extension strength for stability with walking and progressing into strengthening phase. MET 7-6-17  4. Walking without limp on level surfaces and up stairs with reciprocal pattern. Ongoing 7-6-17  Discharge Goals: Time Frame: 10-12 weeks   1. No more than 3/10 pain L knee with all normalized daily home, work, and  conditioning training activities, and score greater than 55/80 on the LEFS. 2. Demonstrate good L knee and LE functional quad strength with good control walking down stairs, slopes  3. Able to balance with good control in single-leg stand greater than 15 seconds with eyes open, greater than 10 seconds with eyes closed for improved dynamic stability. 4. Independent with HEP for advanced knee strengthening. Rehabilitation Potential For Stated Goals: Good  Regarding Arabella Rousseau's therapy, I certify that the treatment plan above will be carried out by a therapist or under their direction. Thank you for this referral,    Blank Lai, MILO         Referring Physician Signature:       Trudy Fairbanks MD            Date                      The information in this section was collected on 6-7-17 (except where otherwise noted). HISTORY:   History of Present Injury/Illness (Reason for Referral): Long history of L knee pain beginning with a sports injury when in high school, then again in the [facility]. Worsening of knee pain and swelling limiting his activities over recent years. Was treated with injections and synovial fluid replacement with limited effects. DJD diagnosed and TKA recommended. This was done 5-17-17. He was hospitalized 2 days with [address] [place] [address] rehab, then discharged home with home health PT for 2 weeks.    Past Medical History/Comorbidities: s/p L TKA 5-17-17. PMH from EMR includes:  Mr. Snow Milligan  has a past medical history of Benign prostatic hyperplasia; CAD (coronary artery disease); Depression; Diastolic dysfunction; Elevated serum creatinine (04/24/2017); Former smoker; H/O echocardiogram (07/24/2014); Hypercholesteremia; Hypertension; Osteoarthritis; and Overactive bladder. He also has no past medical history of Adverse effect of anesthesia; Difficult intubation; Malignant hyperthermia due to anesthesia; Nausea & vomiting; or Pseudocholinesterase deficiency. Mr. Snow Milligan  has a past surgical history that includes urological (2013) and colonoscopy. Social History/Living Environment: Lives with his wife. Home has a flight of stairs to his home office. Prior Level of Function/Work/Activity: Works in sales with frequent driving trips. Was active with walking 30 minutes daily and strength training with a  the past 4-5 years. 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 Current Medications:  Tylenol prn. Protonix and 2 anti-biotics for 2 weeks to finish 6/30/17. Other meds from EMR include:     Current Outpatient Prescriptions:     acetaminophen (TYLENOL) 500 mg tablet, Take 2 Tabs by mouth every six (6) hours. (Patient taking differently: Take 2 Tabs by mouth every six (6) hours. Indications: Fever, Pain), Disp: 120 Tab, Rfl: 0    aspirin 81 mg chewable tablet, Take 1 Tab by mouth two (2) times a day., Disp: 60 Tab, Rfl: 0    cyclobenzaprine (FLEXERIL) 10 mg tablet, Take 0.5 Tabs by mouth three (3) times daily. , Disp: 60 Tab, Rfl: 0    senna-docusate (PERICOLACE) 8.6-50 mg per tablet, Take 2 Tabs by mouth daily. , Disp: 120 Tab, Rfl: 0    zolpidem (AMBIEN) 5 mg tablet, Take 1 Tab by mouth nightly as needed for Sleep. Max Daily Amount: 5 mg., Disp: 30 Tab, Rfl: 0    metoprolol tartrate (LOPRESSOR) 25 mg tablet, Take 25 mg by mouth two (2) times a day.  Take DOS per anesthesia protocol., Disp: , Rfl:     sulindac (CLINORIL) 150 mg tablet, Take 150 mg by mouth two (2) times a day. Non-formulary. Patient instructed to bring to the hospital on the DOS, in the original bottle, and give to nurse., Disp: , Rfl:     olopatadine (PATANASE) 0.6 % spry, 2 Squirts by Both Nostrils route two (2) times a day. Non-formulary. Patient instructed to bring to the hospital on the DOS, in the original bottle, and give to nurse., Disp: , Rfl:     mirabegron ER (MYRBETRIQ) 50 mg ER tablet, Take 50 mg by mouth nightly. Non-formulary. Patient instructed to bring to the hospital on the DOS, in the original bottle, and give to nurse., Disp: , Rfl:     atorvastatin (LIPITOR) 40 mg tablet, Take 40 mg by mouth nightly., Disp: , Rfl:     lisinopril (PRINIVIL, ZESTRIL) 10 mg tablet, Take 10 mg by mouth daily. , Disp: , Rfl:     isosorbide mononitrate ER (IMDUR) 30 mg tablet, Take 30 mg by mouth daily. Take DOS per anesthesia protocol., Disp: , Rfl:     ezetimibe (ZETIA) 10 mg tablet, Take 10 mg by mouth daily. , Disp: , Rfl:     fenofibrate (LOFIBRA) 160 mg tablet, Take 160 mg by mouth daily. , Disp: , Rfl:     FLUoxetine (PROZAC) 20 mg capsule, Take 20 mg by mouth daily. Take DOS per anesthesia protocol., Disp: , Rfl:    Date Last Reviewed:  7-6-17      Number of Personal Factors/Comorbidities that affect the Plan of Care: 1-2: MODERATE COMPLEXITY   EXAMINATION:   7/12/2017  Observation/Orthostatic Postural Assessment: Ambulating with midly antalgic gait pattern with reduced stance time on L LE, no assistive device. ROM:    L knee PROM 0-118, L knee AROM 0-110        Strength:    L quadriceps strength 5/5  R Hip and Knee grossly 5/5 throughout. Functional Mobility: Walking on level ground: Good knee mechanics on L, mildly wide step width and mild decreased trunk control noted, but improved with conscious correction.  Stair walking: able to walk up/down stairs with reciprocal pattern, mild limp on L stance with descent more than ascent; uses bilat rails for UE support. Balance: Not assessed. Body Structures Involved:  1. Joints  2. Muscles Body Functions Affected:  1. Neuromusculoskeletal  2. Movement Related Activities and Participation Affected:  1. General Tasks and Demands  2. Mobility   Number of elements (examined above) that affect the Plan of Care: 4+: HIGH COMPLEXITY   CLINICAL PRESENTATION:   Presentation: Stable and uncomplicated: LOW COMPLEXITY   CLINICAL DECISION MAKING:   Outcome Measure: Tool Used: Lower Extremity Functional Scale (LEFS)  Score:  Initial: 26/80 Most Recent: 57/80 (Date: 7-6-17 )   Interpretation of Score: 20 questions each scored on a 5 point scale with 0 representing \"extreme difficulty or unable to perform\" and 4 representing \"no difficulty\". The lower the score, the greater the functional disability. 80/80 represents no disability. Minimal detectable change is 9 points. Medical Necessity:   · Patient is expected to demonstrate progress in strength, range of motion and balance to return to independent basic mobility and progress to return to his recreational activity. .  Reason for Services/Other Comments:  · Patient continues to require skilled intervention due to the complexity of the surgical procedure, and need for safe, progressive rehab to return to his normal activity at home, work, and recreation actiivitie. Use of outcome tool(s) and clinical judgement create a POC that gives a: Clear prediction of patient's progress: LOW COMPLEXITY          TREATMENT:   (In addition to Assessment/Re-Assessment sessions the following treatments were rendered)  7/12/2017  Pre-treatment Symptoms/Complaints:  Pt continues to progress his walking. 8000 steps yesterday. Pain: Initial:   0 out of 10 Post Session: 0/10    Aquatic Therapy 45 minutes):      Aquatic Exercise Log     Date  6/26 Date  6/28 Date  7/3/17 Date  7/11/17 Date  7/12/17   Activity/ Exercise Parameters Parameters Parameters Parameters Parameters   Walking forward 6 6 6 6 6   Walking backward 6 6 6 6 6   Walking sideways 6 6 6 6 6     Marching 6 6 6 6 6     Goose Step 6 6 6 6 6     Tip toes 3 3 3 3 3     Heels 3 3 3 3 3     Lunges  6 6 6 6   Side step squats        LE Exercises 2# 3# 3# 3# 5#     Hip Flex/Ext 12 with noodle 12 with noodle 12  15 15     Hip Abd/Add 12 with noodle 12 12 15 15     Hip IR/ER          Calf raises 12 12 12 15 15     Knee Flex 12 12 12 15 15     Squats  12 12 15 15     Leg Circles 10/10 10/10 10/10 15/15 15/15     Step Ups 12 12 12 15 15   UE Exercises          Squeeze In          Push Down          Pull Down          Bicep/Tricep        Rows/Press outs         Chi Positions          Trunk Rotation        Deep H2O/ Noodles 7' with noodle and 2# 7' with noodle 3# 7/ with 3# using noodle 7' with 3# using noodle 7' with 5#  Using noodle     Stabilization          Arms only          Legs only  jog 2 min Jog 2 min 2 min 3 min 3 min   Cross   Country 2 min 2 min 2 min2 3 min 3 min     Scissors 2 min 2 min  min 2 min 2 min     Crab walk Knee ext kicks  2 min Knee ext 2 min Knee kicking 2 min Kicking 2 min Kicking 2 min   Lower abdominal   work           Cardio          Jogging        Lap   Swimming          Stretches          Hamstrings  On step 3 x 20 On step 3 x 20  3 x 20     Heelcords          flexion  On step 3 x 20 On step 3 x 20  3 x 20  Quad stretching 10 x 5 sec     Neck          Therapeutic Exercise: (  minutes):  Exercises per grid below to improve mobility, strength and L knee ROM. Required minimal verbal, manual and tactile cues to promote proper body mechanics. Progressed range and repetitions as indicated. Pt concluded PT with 10 minutes on stationary bicycle for improved L knee ROM.      Date:  6/30/17 Date:  7/6/17 Date:     Activity/Exercise Parameters Parameters Parameters   Quad sets 2 x 15 1 x 20    Short arc quads 2 x 15 2 x 15 2#    Seated hamstring curls Green; 2 x 15 Blue 2 x 10    Heel slides 1 x 20 1 x 20    Straight leg raise 2 x 10 2 x 10    Long arc quad  2x15     Slantboard calf stretch 3 x 30\"     Step ups 4\", 2 x 10     Side-lying hip ABD  2 x 10    Standing HS curls  2 x 10    Manual therapy ( minutes): Francheska Galo Treatment/Session Assessment:    · Response to Treatment: continues to progress with strengthening and gait. · Compliance with Program/Exercises: Appears compliant. · Recommendations/Intent for next treatment session: We will continue with pain and swelling control, knee motion treatment, and progressive isolated and functional strengthening. We will continue with Aquatic Therapy for open and closed chain knee exercises in a reduced joint weight loading environment and clinic treatments for motion and functional strengthening.    Total Treatment Duration: 45 minutes  PT Patient Time In/Time Out  Time In: 1100  Time Out: [facility],

## 2017-07-13 ENCOUNTER — HOSPITAL ENCOUNTER (OUTPATIENT)
Dept: PHYSICAL THERAPY | Age: 67
Discharge: HOME | End: 2017-07-13
Payer: COMMERCIAL

## 2017-07-13 PROCEDURE — 97110 THERAPEUTIC EXERCISES: CPT

## 2017-07-13 PROCEDURE — 97140 MANUAL THERAPY 1/> REGIONS: CPT

## 2017-07-13 NOTE — PROGRESS NOTES
Shira Late  : 1950 [facility] at [facility] VAIBHAV GOMEZ  [address] [facility] ([place], [place]), [address] [place] [address],[address], [place] [address].  [phone]   [phone]       OUTPATIENT PHYSICAL THERAPY:Daily Note 2017      ICD-10: Treatment Diagnosis: Pain in left knee (M25.562); Difficulty in walking, not elsewhere classified (R26.2); Presence of left artificial knee joint (D31.935)  Precautions/Allergies: post-op TKA precautions. Neosporin [hydrocortisone] and Pcn [penicillins]   Fall Risk Score: 2 (? 5 = High Risk)  MD Orders: Eval and Treat TKA rehab (17) MEDICAL/REFERRING DIAGNOSIS: H42.922 History of knee replacement procedure L knee  History of knee replacement procedure of left knee [Z96.652]   DATE OF ONSET: 17  REFERRING PHYSICIAN: Hershall Lefort, MD  RETURN PHYSICIAN APPOINTMENT: 17      ASSESSMENT:  Mr. Jose J Hirsch is 7 weeks s/p L TKA. He is making significant progress with his rehabilitation, and reports little to no pain. He has improved his ROM and strength since initial evaluation. Patient will benefit from continued PT to normalized his gait mechanics, improve his L knee ROM and facilitate continued improvement with participation in recreational and vocational activities. PROBLEM LIST (Impacting functional limitations):  1. Post-op pain and swelling L knee  2. Decreased ROM L knee   3. Decreased functional strength L knee/LE   4. Decreased gait skills INTERVENTIONS PLANNED:  1. Thermal and electric modalities, manual therapies for pain. 2. Manual therapies and therapeutic exercises for ROM and strength. 3. Therapeutic exercises for gait and balance   TREATMENT PLAN:  Effective Dates: 2017 TO 2017. Frequency/Duration: 3 times a week for 4 weeks, then assess need for additional 4-8 weeks to progress strengthening toward discharge goals. GOALS: (Goals have been discussed and agreed upon with patient.)   Short-Term Functional Goals: Time Frame: 4-6 weeks*  1.  Report no more than minimal, intermittent 3/10 pain L knee with basic walking and ADL's, and score greater than 40/80 on the LEFS. MET 7/6/17  2. L knee PROM is greater than or equal to 0-115 degrees flexion. MET 7-6-17  3. L quad strength is 5/5 with good terminal extension strength for stability with walking and progressing into strengthening phase. MET 7-6-17  4. Walking without limp on level surfaces and up stairs with reciprocal pattern. Ongoing 7-6-17  Discharge Goals: Time Frame: 10-12 weeks   1. No more than 3/10 pain L knee with all normalized daily home, work, and  conditioning training activities, and score greater than 55/80 on the LEFS. 2. Demonstrate good L knee and LE functional quad strength with good control walking down stairs, slopes  3. Able to balance with good control in single-leg stand greater than 15 seconds with eyes open, greater than 10 seconds with eyes closed for improved dynamic stability. 4. Independent with [facility] for advanced knee strengthening. Rehabilitation Potential For Stated Goals: Good              The information in this section was collected on 6-7-17 (except where otherwise noted). HISTORY:   History of Present Injury/Illness (Reason for Referral): Long history of L knee pain beginning with a sports injury when in high school, then again in the [facility]. Worsening of knee pain and swelling limiting his activities over recent years. Was treated with injections and synovial fluid replacement with limited effects. DJD diagnosed and TKA recommended. This was done 5-17-17. He was hospitalized 2 days with [address] [place] [address] rehab, then discharged home with home health PT for 2 weeks. Past Medical History/Comorbidities: s/p L TKA 5-17-17. PMH from EMR includes:  Mr. Brandon Lake  has a past medical history of Benign prostatic hyperplasia; CAD (coronary artery disease); Depression; Diastolic dysfunction; Elevated serum creatinine (04/24/2017);  Former smoker; H/O echocardiogram (07/24/2014); Hypercholesteremia; Hypertension; Osteoarthritis; and Overactive bladder. He also has no past medical history of Adverse effect of anesthesia; Difficult intubation; Malignant hyperthermia due to anesthesia; Nausea & vomiting; or Pseudocholinesterase deficiency. Mr. Sil Adkins  has a past surgical history that includes urological (2013) and colonoscopy. Social History/Living Environment: Lives with his wife. Home has a flight of stairs to his home office. Prior Level of Function/Work/Activity: Works in [employer] with frequent driving trips. Was active with walking 30 minutes daily and strength training with a  the past 4-5 years. 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 Current Medications:  Tylenol prn. Protonix and 2 anti-biotics for 2 weeks to finish 6/30/17. Other meds from EMR include:     Current Outpatient Prescriptions:     acetaminophen (TYLENOL) 500 mg tablet, Take 2 Tabs by mouth every six (6) hours. (Patient taking differently: Take 2 Tabs by mouth every six (6) hours. Indications: Fever, Pain), Disp: 120 Tab, Rfl: 0    aspirin 81 mg chewable tablet, Take 1 Tab by mouth two (2) times a day., Disp: 60 Tab, Rfl: 0    cyclobenzaprine (FLEXERIL) 10 mg tablet, Take 0.5 Tabs by mouth three (3) times daily. , Disp: 60 Tab, Rfl: 0    senna-docusate (PERICOLACE) 8.6-50 mg per tablet, Take 2 Tabs by mouth daily. , Disp: 120 Tab, Rfl: 0    zolpidem (AMBIEN) 5 mg tablet, Take 1 Tab by mouth nightly as needed for Sleep. Max Daily Amount: 5 mg., Disp: 30 Tab, Rfl: 0    metoprolol tartrate (LOPRESSOR) 25 mg tablet, Take 25 mg by mouth two (2) times a day. Take DOS per anesthesia protocol., Disp: , Rfl:     sulindac (CLINORIL) 150 mg tablet, Take 150 mg by mouth two (2) times a day. Non-formulary.  Patient instructed to bring to the hospital on the DOS, in the original bottle, and give to nurse., Disp: , Rfl:     olopatadine (PATANASE) 0.6 % spry, 2 Squirts by Both Nostrils route two (2) times a day. Non-formulary. Patient instructed to bring to the hospital on the DOS, in the original bottle, and give to nurse., Disp: , Rfl:     mirabegron ER (MYRBETRIQ) 50 mg ER tablet, Take 50 mg by mouth nightly. Non-formulary. Patient instructed to bring to the hospital on the DOS, in the original bottle, and give to nurse., Disp: , Rfl:     atorvastatin (LIPITOR) 40 mg tablet, Take 40 mg by mouth nightly., Disp: , Rfl:     lisinopril (PRINIVIL, ZESTRIL) 10 mg tablet, Take 10 mg by mouth daily. , Disp: , Rfl:     isosorbide mononitrate ER (IMDUR) 30 mg tablet, Take 30 mg by mouth daily. Take DOS per anesthesia protocol., Disp: , Rfl:     ezetimibe (ZETIA) 10 mg tablet, Take 10 mg by mouth daily. , Disp: , Rfl:     fenofibrate (LOFIBRA) 160 mg tablet, Take 160 mg by mouth daily. , Disp: , Rfl:     FLUoxetine (PROZAC) 20 mg capsule, Take 20 mg by mouth daily. Take DOS per anesthesia protocol., Disp: , Rfl:    Date Last Reviewed:  7-13-17      Number of Personal Factors/Comorbidities that affect the Plan of Care: 1-2: MODERATE COMPLEXITY   EXAMINATION:   7/13/2017  Observation/Orthostatic Postural Assessment: Mild swelling noted to L knee. .    ROM:   LLE PROM  L Knee Flexion: 125  L Knee Extension: -2        Strength: Not measured. Functional Mobility: Walking on level ground: Good knee mechanics on L, no significant limp on L. Balance: Not assessed. Body Structures Involved:  1. Joints  2. Muscles Body Functions Affected:  1. Neuromusculoskeletal  2. Movement Related Activities and Participation Affected:  1. General Tasks and Demands  2. Mobility   Number of elements (examined above) that affect the Plan of Care: 4+: HIGH COMPLEXITY   CLINICAL PRESENTATION:   Presentation: Stable and uncomplicated: LOW COMPLEXITY   CLINICAL DECISION MAKING:   Outcome Measure:    Tool Used: Lower Extremity Functional Scale (LEFS)  Score:  Initial: 26/80 Most Recent: 57/80 (Date: 7-6-17 )   Interpretation of Score: 20 questions each scored on a 5 point scale with 0 representing \"extreme difficulty or unable to perform\" and 4 representing \"no difficulty\". The lower the score, the greater the functional disability. 80/80 represents no disability. Minimal detectable change is 9 points. Medical Necessity:   · Patient is expected to demonstrate progress in strength, range of motion and balance to return to independent basic mobility and progress to return to his recreational activity. .  Reason for Services/Other Comments:  · Patient continues to require skilled intervention due to the complexity of the surgical procedure, and need for safe, progressive rehab to return to his normal activity at home, work, and recreation actiivitie. Use of outcome tool(s) and clinical judgement create a POC that gives a: Clear prediction of patient's progress: LOW COMPLEXITY          TREATMENT:   (In addition to Assessment/Re-Assessment sessions the following treatments were rendered)  7/13/2017  Pre-treatment Symptoms/Complaints: Says he has been doing very well. Knee is feeling good. Has been doing pool exercises at home  Pain: Initial:  No pain reported. Post Session: 0/10      Manual Therapies (10 Minutes): Physiologic mobilizations to L knee extension and flexion for stiffness, grade 4- - to 4- to each. Therapeutic Exercise: (35 Minutes): Exercise for knee ROM and flexibility with Assisted Active Isolated Stretching to L gastroc., posterior hip, hip adductors, lateral hip, hamstrings in 90/90 and SLR, and quads in prone and side-lying, 3\"x10 each. Performed strength exercises as per grid below. Exercises and resistances modified as indicated. HEP: He is to continue with his walking endurance, pool exercises, and balance. He verbalizes understanding.    Date:  6/30/17 Date:  7/6/17 Date:  7-13-17   Activity/Exercise Parameters Parameters Parameters   Knee ROM/Flexibilty   As above   Quad sets 2 x 15 1 x 20 DC to HEP   Short arc quads 2 x 15 2 x 15 2# 3# 6-10\" x10   Seated hamstring curls Green; 2 x 15 Blue 2 x 10 Machine, bilat  30# 2x15   Heel slides 1 x 20 1 x 20 DC to HEP   Straight leg raise 2 x 10 2 x 10 -   Long arc quad  2x15  Machine, bilat  10# x15  20# 2x15   Slantboard calf stretch 3 x 30\"  -   Step ups 4\", 2 x 10  -   Side-lying hip ABD  2 x 10 -   Standing HS curls  2 x 10 -   Shuttle Press   bilat   50# 1x15  75# 2x15   Balance--Static   Single-leg   5x10\" ea   Balance--Dynamic   Arm pull, tandem  Green tubing  3x30 ea     Treatment/Session Assessment:    · Response to Treatment:  He is about 2 months post-op now. Doing very well. Little pain to report. Very good knee ROM. Mild stiffness to extension. Good performance with the exercises done. · Compliance with Program/Exercises: Appears compliant. · Recommendations/Intent for next treatment session: We will continue with knee motion treatment, progressive isolated and functional strengthening with land and pool exercises.    Total Treatment Duration: 45 minutes  PT Patient Time In/Time Out  Time In: 1305  Time Out: Mingo Flores PT

## 2017-07-19 ENCOUNTER — HOSPITAL ENCOUNTER (OUTPATIENT)
Dept: PHYSICAL THERAPY | Age: 67
Discharge: HOME | End: 2017-07-19
Payer: COMMERCIAL

## 2017-07-19 PROCEDURE — 97110 THERAPEUTIC EXERCISES: CPT

## 2017-07-19 NOTE — PROGRESS NOTES
Salvatore Shay  : 1950 [facility] VAIBHAV GOMEZ  [address] [facility], [address] [place] [address],[address], [place] [address].  [phone]   [phone]       OUTPATIENT PHYSICAL THERAPY:Daily Note 2017      ICD-10: Treatment Diagnosis: Pain in left knee (M25.562); Difficulty in walking, not elsewhere classified (R26.2); Presence of left artificial knee joint (A92.316)  Precautions/Allergies: post-op TKA precautions. Neosporin [hydrocortisone] and Pcn [penicillins]   Fall Risk Score: 2 (? 5 = High Risk)  MD Orders: Eval and Treat TKA rehab (17) MEDICAL/REFERRING DIAGNOSIS: U23.291 History of knee replacement procedure L knee  History of knee replacement procedure of left knee [Z96.652]   DATE OF ONSET: 17  REFERRING PHYSICIAN: Reny Torres MD  RETURN PHYSICIAN APPOINTMENT: 17      ASSESSMENT:  Mr. Cali Velasco is 7 weeks s/p L TKA. He is making significant progress with his rehabilitation, and reports little to no pain. He has improved his ROM and strength since initial evaluation. Patient will benefit from continued PT to normalized his gait mechanics, improve his L knee ROM and facilitate continued improvement with participation in recreational and vocational activities. PROBLEM LIST (Impacting functional limitations):  1. Post-op pain and swelling L knee  2. Decreased ROM L knee   3. Decreased functional strength L knee/LE   4. Decreased gait skills INTERVENTIONS PLANNED:  1. Thermal and electric modalities, manual therapies for pain. 2. Manual therapies and therapeutic exercises for ROM and strength. 3. Therapeutic exercises for gait and balance   TREATMENT PLAN:  Effective Dates: 2017 TO 2017. Frequency/Duration: 3 times a week for 4 weeks, then assess need for additional 4-8 weeks to progress strengthening toward discharge goals. GOALS: (Goals have been discussed and agreed upon with patient.)   Short-Term Functional Goals: Time Frame: 4-6 weeks*  1.  Report no more than minimal, intermittent 3/10 pain L knee with basic walking and ADL's, and score greater than 40/80 on the LEFS. MET 7/6/17  2. L knee PROM is greater than or equal to 0-115 degrees flexion. MET 7-6-17  3. L quad strength is 5/5 with good terminal extension strength for stability with walking and progressing into strengthening phase. MET 7-6-17  4. Walking without limp on level surfaces and up stairs with reciprocal pattern. Ongoing 7-6-17  Discharge Goals: Time Frame: 10-12 weeks   1. No more than 3/10 pain L knee with all normalized daily home, work, and  conditioning training activities, and score greater than 55/80 on the LEFS. 2. Demonstrate good L knee and LE functional quad strength with good control walking down stairs, slopes  3. Able to balance with good control in single-leg stand greater than 15 seconds with eyes open, greater than 10 seconds with eyes closed for improved dynamic stability. 4. Independent with [facility] for advanced knee strengthening. Rehabilitation Potential For Stated Goals: Good              The information in this section was collected on 6-7-17 (except where otherwise noted). HISTORY:   History of Present Injury/Illness (Reason for Referral): Long history of L knee pain beginning with a sports injury when in high school, then again in the [facility]. Worsening of knee pain and swelling limiting his activities over recent years. Was treated with injections and synovial fluid replacement with limited effects. DJD diagnosed and TKA recommended. This was done 5-17-17. He was hospitalized 2 days with [address] [place] [address] rehab, then discharged home with home health PT for 2 weeks. Past Medical History/Comorbidities: s/p L TKA 5-17-17. PMH from EMR includes:  Mr. Wayne Mobley  has a past medical history of Benign prostatic hyperplasia; CAD (coronary artery disease); Depression; Diastolic dysfunction; Elevated serum creatinine (04/24/2017);  Former smoker; H/O echocardiogram (07/24/2014); Hypercholesteremia; Hypertension; Osteoarthritis; and Overactive bladder. He also has no past medical history of Adverse effect of anesthesia; Difficult intubation; Malignant hyperthermia due to anesthesia; Nausea & vomiting; or Pseudocholinesterase deficiency. Mr. Odalis Strauss  has a past surgical history that includes urological (2013) and colonoscopy. Social History/Living Environment: Lives with his wife. Home has a flight of stairs to his home office. Prior Level of Function/Work/Activity: Works in sales with frequent driving trips. Was active with walking 30 minutes daily and strength training with a  the past 4-5 years. 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 Current Medications:  Tylenol prn. Protonix and 2 anti-biotics for 2 weeks to finish 6/30/17. Other meds from EMR include:     Current Outpatient Prescriptions:     acetaminophen (TYLENOL) 500 mg tablet, Take 2 Tabs by mouth every six (6) hours. (Patient taking differently: Take 2 Tabs by mouth every six (6) hours. Indications: Fever, Pain), Disp: 120 Tab, Rfl: 0    aspirin 81 mg chewable tablet, Take 1 Tab by mouth two (2) times a day., Disp: 60 Tab, Rfl: 0    cyclobenzaprine (FLEXERIL) 10 mg tablet, Take 0.5 Tabs by mouth three (3) times daily. , Disp: 60 Tab, Rfl: 0    senna-docusate (PERICOLACE) 8.6-50 mg per tablet, Take 2 Tabs by mouth daily. , Disp: 120 Tab, Rfl: 0    zolpidem (AMBIEN) 5 mg tablet, Take 1 Tab by mouth nightly as needed for Sleep. Max Daily Amount: 5 mg., Disp: 30 Tab, Rfl: 0    metoprolol tartrate (LOPRESSOR) 25 mg tablet, Take 25 mg by mouth two (2) times a day. Take DOS per anesthesia protocol., Disp: , Rfl:     sulindac (CLINORIL) 150 mg tablet, Take 150 mg by mouth two (2) times a day. Non-formulary.  Patient instructed to bring to the hospital on the DOS, in the original bottle, and give to nurse., Disp: , Rfl:     olopatadine (PATANASE) 0.6 % spry, 2 Squirts by Both Nostrils route two (2) times a day. Non-formulary. Patient instructed to bring to the hospital on the DOS, in the original bottle, and give to nurse., Disp: , Rfl:     mirabegron ER (MYRBETRIQ) 50 mg ER tablet, Take 50 mg by mouth nightly. Non-formulary. Patient instructed to bring to the hospital on the DOS, in the original bottle, and give to nurse., Disp: , Rfl:     atorvastatin (LIPITOR) 40 mg tablet, Take 40 mg by mouth nightly., Disp: , Rfl:     lisinopril (PRINIVIL, ZESTRIL) 10 mg tablet, Take 10 mg by mouth daily. , Disp: , Rfl:     isosorbide mononitrate ER (IMDUR) 30 mg tablet, Take 30 mg by mouth daily. Take DOS per anesthesia protocol., Disp: , Rfl:     ezetimibe (ZETIA) 10 mg tablet, Take 10 mg by mouth daily. , Disp: , Rfl:     fenofibrate (LOFIBRA) 160 mg tablet, Take 160 mg by mouth daily. , Disp: , Rfl:     FLUoxetine (PROZAC) 20 mg capsule, Take 20 mg by mouth daily. Take DOS per anesthesia protocol., Disp: , Rfl:    Date Last Reviewed:  7-13-17      Number of Personal Factors/Comorbidities that affect the Plan of Care: 1-2: MODERATE COMPLEXITY   EXAMINATION:   7/19/2017  Observation/Orthostatic Postural Assessment: Mild swelling noted to L knee. .    ROM:   LLE PROM  L Knee Flexion: 125  L Knee Extension: -2        Strength: Not measured. Functional Mobility: Walking on level ground: Good knee mechanics on L, no significant limp on L. Balance: Not assessed. Body Structures Involved:  1. Joints  2. Muscles Body Functions Affected:  1. Neuromusculoskeletal  2. Movement Related Activities and Participation Affected:  1. General Tasks and Demands  2. Mobility   Number of elements (examined above) that affect the Plan of Care: 4+: HIGH COMPLEXITY   CLINICAL PRESENTATION:   Presentation: Stable and uncomplicated: LOW COMPLEXITY   CLINICAL DECISION MAKING:   Outcome Measure:    Tool Used: Lower Extremity Functional Scale (LEFS)  Score:  Initial: 26/80 Most Recent: 57/80 (Date: 7-6-17 )   Interpretation of Score: 20 questions each scored on a 5 point scale with 0 representing \"extreme difficulty or unable to perform\" and 4 representing \"no difficulty\". The lower the score, the greater the functional disability. 80/80 represents no disability. Minimal detectable change is 9 points. Medical Necessity:   · Patient is expected to demonstrate progress in strength, range of motion and balance to return to independent basic mobility and progress to return to his recreational activity. .  Reason for Services/Other Comments:  · Patient continues to require skilled intervention due to the complexity of the surgical procedure, and need for safe, progressive rehab to return to his normal activity at home, work, and recreation actiivitie. Use of outcome tool(s) and clinical judgement create a POC that gives a: Clear prediction of patient's progress: LOW COMPLEXITY          TREATMENT:   (In addition to Assessment/Re-Assessment sessions the following treatments were rendered)  7/19/2017  Pre-treatment Symptoms/Complaints: Says his knee is feeling good. Has been doing pool exercises at home, and up to walking 2482-1093 steps daily. Goal is 10,000 steps daily. Doing some hill intervals of 50-60 yards a few days per week. Going to the gym for core and upper body work. No leg work at the gym right now. Pain: Initial:  No significant pain reported. Post Session: 0/10      Active warm up on bike x10', rpm's from 55 progressing to 80. Therapeutic Exercise: (50 Minutes): Exercise for knee ROM and flexibility with physiologic knee extension and flexion stretch, grade 4-, 3x20-30\" each; and assisted Active Isolated Stretching to L gastroc., posterior hip, hip adductors, lateral hip, hamstrings in 90/90 and SLR, and quads in side-lying, prone, and Gaston positions, 3\"x10 each. Added myokinematic drills with instruction and performance in 90/90 hip lift/hip shift and progressing to pascale-bridge. Control and strength exercises done as per grid below. Added wobble board in supported to unsupported double-leg with control shifts in frontal and sagittal planes and circles. Strength exercises and resistances modified as indicated. HEP: He is to continue with his existing HEP. He verbalizes understanding. Date:  6/30/17 Date:  7/6/17 Date:  7-13-17 Date  7-19-17   Activity/Exercise Parameters Parameters Parameters    Knee ROM/Flexibilty   As above As abvoe   Quad sets 2 x 15 1 x 20 DC to HEP    Short arc quads 2 x 15 2 x 15 2# 3# 6-10\" x10    Seated hamstring curls Green; 2 x 15 Blue 2 x 10 Machine, bilat  30# 2x15 Machine, bilat  40# 3x15   Heel slides 1 x 20 1 x 20 DC to HEP    Straight leg raise 2 x 10 2 x 10 - -   Long arc quad  2x15  Machine, bilat  10# x15  20# 2x15 Machine, bilat  30# 3x15   Slantboard calf stretch 3 x 30\"  - -   Step ups 4\", 2 x 10  - Lateral  4-in 3x5 ea   Side-lying hip ABD  2 x 10 - -   Standing HS curls  2 x 10 - -   Shuttle Press   bilat   50# 1x15  75# 2x15 bilat  75# 1x15  87.5# 2x15   Balance--Static   Single-leg   5x10\" ea -   Balance--Dynamic   Arm pull, tandem  Green tubing  3x30 ea Wobble board x30 ea as above     Treatment/Session Assessment:    · Response to Treatment:  Very good knee ROM. Mild stiffness to extension. Good performance with the exercises done. Functional weakness to whole L LE noted with the lateral step up move. Good start on wobble board for dynamic control. No significant pain increase with these exercises. Muscle fatigue noted. · Compliance with Program/Exercises: Appears compliant. · Recommendations/Intent for next treatment session: We will continue with knee motion treatment, progressive isolated and functional strengthening with land and pool exercises.    Total Treatment Duration: 50 minutes  PT Patient Time In/Time Out  Time In: 1315  Time Out: Beryl 64, PT, MSPT, OCS

## 2017-07-21 ENCOUNTER — HOSPITAL ENCOUNTER (OUTPATIENT)
Dept: PHYSICAL THERAPY | Age: 67
Discharge: HOME | End: 2017-07-21
Payer: COMMERCIAL

## 2017-07-21 PROCEDURE — 97113 AQUATIC THERAPY/EXERCISES: CPT

## 2017-07-21 NOTE — PROGRESS NOTES
Sarah Sloan  : 1950 [facility] VAIBHAV GOMEZ  [address] [facility], [address] [place] [address] [address], [address] [facility]  [phone]   [phone]       OUTPATIENT PHYSICAL THERAPY:Daily Note 2017      ICD-10: Treatment Diagnosis: Pain in left knee (M25.562); Difficulty in walking, not elsewhere classified (R26.2); Presence of left artificial knee joint (C63.325)  Precautions/Allergies: post-op TKA precautions. Neosporin [hydrocortisone] and Pcn [penicillins]   Fall Risk Score: 2 (? 5 = High Risk)  MD Orders: Eval and Treat TKA rehab (17) MEDICAL/REFERRING DIAGNOSIS: K22.801 History of knee replacement procedure L knee  History of knee replacement procedure of left knee [Z96.652]   DATE OF ONSET: 17  REFERRING PHYSICIAN: Yasmin Gray MD  RETURN PHYSICIAN APPOINTMENT: 17      ASSESSMENT:  Mr. Asha Gatica is 7 weeks s/p L TKA. He is making significant progress with his rehabilitation, and reports little to no pain. He has improved his ROM and strength since initial evaluation. Patient will benefit from continued PT to normalized his gait mechanics, improve his L knee ROM and facilitate continued improvement with participation in recreational and vocational activities. PROBLEM LIST (Impacting functional limitations):  1. Post-op pain and swelling L knee  2. Decreased ROM L knee   3. Decreased functional strength L knee/LE   4. Decreased gait skills INTERVENTIONS PLANNED:  1. Thermal and electric modalities, manual therapies for pain. 2. Manual therapies and therapeutic exercises for ROM and strength. 3. Therapeutic exercises for gait and balance   TREATMENT PLAN:  Effective Dates: 2017 TO 2017. Frequency/Duration: 3 times a week for 4 weeks, then assess need for additional 4-8 weeks to progress strengthening toward discharge goals. GOALS: (Goals have been discussed and agreed upon with patient.)   Short-Term Functional Goals: Time Frame: 4-6 weeks*  1.  Report no more than minimal, intermittent 3/10 pain L knee with basic walking and ADL's, and score greater than 40/80 on the LEFS. MET 7/6/17  2. L knee PROM is greater than or equal to 0-115 degrees flexion. MET 7-6-17  3. L quad strength is 5/5 with good terminal extension strength for stability with walking and progressing into strengthening phase. MET 7-6-17  4. Walking without limp on level surfaces and up stairs with reciprocal pattern. Ongoing 7-6-17  Discharge Goals: Time Frame: 10-12 weeks   1. No more than 3/10 pain L knee with all normalized daily home, work, and  conditioning training activities, and score greater than 55/80 on the LEFS. 2. Demonstrate good L knee and LE functional quad strength with good control walking down stairs, slopes  3. Able to balance with good control in single-leg stand greater than 15 seconds with eyes open, greater than 10 seconds with eyes closed for improved dynamic stability. 4. Independent with [facility] for advanced knee strengthening. Rehabilitation Potential For Stated Goals: Good              The information in this section was collected on 6-7-17 (except where otherwise noted). HISTORY:   History of Present Injury/Illness (Reason for Referral): Long history of L knee pain beginning with a sports injury when in high school, then again in the [facility]. Worsening of knee pain and swelling limiting his activities over recent years. Was treated with injections and synovial fluid replacement with limited effects. DJD diagnosed and TKA recommended. This was done 5-17-17. He was hospitalized 2 days with [address] [place] [address] rehab, then discharged home with home health PT for 2 weeks. Past Medical History/Comorbidities: s/p L TKA 5-17-17. PMH from EMR includes:  Mr. Jessica Riley  has a past medical history of Benign prostatic hyperplasia; CAD (coronary artery disease); Depression; Diastolic dysfunction; Elevated serum creatinine (04/24/2017);  Former smoker; H/O echocardiogram (07/24/2014); Hypercholesteremia; Hypertension; Osteoarthritis; and Overactive bladder. He also has no past medical history of Adverse effect of anesthesia; Difficult intubation; Malignant hyperthermia due to anesthesia; Nausea & vomiting; or Pseudocholinesterase deficiency. Mr. Ginnie Nissen  has a past surgical history that includes urological (2013) and colonoscopy. Social History/Living Environment: Lives with his wife. Home has a flight of stairs to his home office. Prior Level of Function/Work/Activity: Works in sales with frequent driving trips. Was active with walking 30 minutes daily and strength training with a  the past 4-5 years. 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 Current Medications:  Tylenol prn. Protonix and 2 anti-biotics for 2 weeks to finish 6/30/17. Other meds from EMR include:     Current Outpatient Prescriptions:     acetaminophen (TYLENOL) 500 mg tablet, Take 2 Tabs by mouth every six (6) hours. (Patient taking differently: Take 2 Tabs by mouth every six (6) hours. Indications: Fever, Pain), Disp: 120 Tab, Rfl: 0    aspirin 81 mg chewable tablet, Take 1 Tab by mouth two (2) times a day., Disp: 60 Tab, Rfl: 0    cyclobenzaprine (FLEXERIL) 10 mg tablet, Take 0.5 Tabs by mouth three (3) times daily. , Disp: 60 Tab, Rfl: 0    senna-docusate (PERICOLACE) 8.6-50 mg per tablet, Take 2 Tabs by mouth daily. , Disp: 120 Tab, Rfl: 0    zolpidem (AMBIEN) 5 mg tablet, Take 1 Tab by mouth nightly as needed for Sleep. Max Daily Amount: 5 mg., Disp: 30 Tab, Rfl: 0    metoprolol tartrate (LOPRESSOR) 25 mg tablet, Take 25 mg by mouth two (2) times a day. Take DOS per anesthesia protocol., Disp: , Rfl:     sulindac (CLINORIL) 150 mg tablet, Take 150 mg by mouth two (2) times a day. Non-formulary.  Patient instructed to bring to the hospital on the DOS, in the original bottle, and give to nurse., Disp: , Rfl:     olopatadine (PATANASE) 0.6 % spry, 2 Squirts by Both Nostrils route two (2) times a day. Non-formulary. Patient instructed to bring to the hospital on the DOS, in the original bottle, and give to nurse., Disp: , Rfl:     mirabegron ER (MYRBETRIQ) 50 mg ER tablet, Take 50 mg by mouth nightly. Non-formulary. Patient instructed to bring to the hospital on the DOS, in the original bottle, and give to nurse., Disp: , Rfl:     atorvastatin (LIPITOR) 40 mg tablet, Take 40 mg by mouth nightly., Disp: , Rfl:     lisinopril (PRINIVIL, ZESTRIL) 10 mg tablet, Take 10 mg by mouth daily. , Disp: , Rfl:     isosorbide mononitrate ER (IMDUR) 30 mg tablet, Take 30 mg by mouth daily. Take DOS per anesthesia protocol., Disp: , Rfl:     ezetimibe (ZETIA) 10 mg tablet, Take 10 mg by mouth daily. , Disp: , Rfl:     fenofibrate (LOFIBRA) 160 mg tablet, Take 160 mg by mouth daily. , Disp: , Rfl:     FLUoxetine (PROZAC) 20 mg capsule, Take 20 mg by mouth daily. Take DOS per anesthesia protocol., Disp: , Rfl:    Date Last Reviewed:  7-21-17      Number of Personal Factors/Comorbidities that affect the Plan of Care: 1-2: MODERATE COMPLEXITY   EXAMINATION:          TREATMENT:   (In addition to Assessment/Re-Assessment sessions the following treatments were rendered)  7/21/2017  Pre-treatment Symptoms/Complaints: Continues to improve and increasing his walking gradually. No complaints  Pain: Initial:  No significant pain reported.   Post Session: 0/10         Date  6/26 Date  6/28 Date  7/3/17 Date  7/11/17 Date  7/12/17 Date  7/24/17   Activity/ Exercise Parameters Parameters Parameters Parameters Parameters 5#   Walking forward 6 6 6 6 6 6   Walking backward 6 6 6 6 6 6   Walking sideways 6 6 6 6 6 6     Marching 6 6 6 6 6 6     Goose Step 6 6 6 6 6 6     Tip toes 3 3 3 3 3 3     Heels 3 3 3 3 3 3     Lunges  6 6 6 6 6   Side step squats         LE Exercises 2# 3# 3# 3# 5# 5#     Hip Flex/Ext 12 with noodle 12 with noodle 12  15 15 15     Hip Abd/Add 12 with noodle 12 12 15 15 15 Hip IR/ER           Calf raises 12 12 12 15 15 15     Knee Flex 12 12 12 15 15 15     Squats  12 12 15 15 15     Leg Circles 10/10 10/10 10/10 15/15 15/15 15/15     Step Ups 12 12 12 15 15 15   UE Exercises           Squeeze In           Push Down           Pull Down           Bicep/Tricep         Rows/Press outs          Chi Positions           Trunk Rotation         Deep H2O/ Noodles 7' with noodle and 2# 7' with noodle 3# 7/ with 3# using noodle 7' with 3# using noodle 7' with 5#  Using noodle 7' with 5# using noodle     Stabilization           Arms only           Legs only  jog 2 min Jog 2 min 2 min 3 min 3 min 3 min   Cross   Country 2 min 2 min 2 min2 3 min 3 min 3 min     Scissors 2 min 2 min  min 2 min 2 min 3 min     Crab walk Knee ext kicks  2 min Knee ext 2 min Knee kicking 2 min Kicking 2 min Kicking 2 min kicing 2 min   Lower abdominal   work            Cardio           Jogging         Lap   Swimming           Stretches           Treatment/Session Assessment:    · Response to Treatment:  Did well today with weights. No complaints. · Compliance with Program/Exercises: Appears compliant. · Recommendations/Intent for next treatment session: We will continue with knee motion treatment, progressive isolated and functional strengthening with land and pool exercises.    Total Treatment Duration: 45 minutes  PT Patient Time In/Time Out  Time In: 0115  Time Out: 0200     Joan Higgins, PT

## 2017-07-24 ENCOUNTER — APPOINTMENT (OUTPATIENT)
Dept: PHYSICAL THERAPY | Age: 67
End: 2017-07-24
Payer: COMMERCIAL

## 2017-07-24 ENCOUNTER — HOSPITAL ENCOUNTER (OUTPATIENT)
Dept: PHYSICAL THERAPY | Age: 67
Discharge: HOME | End: 2017-07-24
Payer: COMMERCIAL

## 2017-07-24 PROCEDURE — 97110 THERAPEUTIC EXERCISES: CPT

## 2017-07-24 NOTE — PROGRESS NOTES
Gómez Long  : 1950 [facility] at [facility] VAIBHAV GOMEZ  [address] [facility], [address] [place] [address],[address], [address] [facility]  [phone]   [phone]       OUTPATIENT PHYSICAL THERAPY:Daily Note 2017      ICD-10: Treatment Diagnosis: Pain in left knee (M25.562); Difficulty in walking, not elsewhere classified (R26.2); Presence of left artificial knee joint (I88.004)  Precautions/Allergies: post-op TKA precautions. Neosporin [hydrocortisone] and Pcn [penicillins]   Fall Risk Score: 2 (? 5 = High Risk)  MD Orders: Eval and Treat TKA rehab (17) MEDICAL/REFERRING DIAGNOSIS: A21.630 History of knee replacement procedure L knee  History of knee replacement procedure of left knee [Z96.652]   DATE OF ONSET: 17  REFERRING PHYSICIAN: Susan Oconnor MD  RETURN PHYSICIAN APPOINTMENT: 17      ASSESSMENT:  Mr. Ranjit Villalba is 7 weeks s/p L TKA. He is making significant progress with his rehabilitation, and reports little to no pain. He has improved his ROM and strength since initial evaluation. Patient will benefit from continued PT to normalized his gait mechanics, improve his L knee ROM and facilitate continued improvement with participation in recreational and vocational activities. PROBLEM LIST (Impacting functional limitations):  1. Post-op pain and swelling L knee  2. Decreased ROM L knee   3. Decreased functional strength L knee/LE   4. Decreased gait skills INTERVENTIONS PLANNED:  1. Thermal and electric modalities, manual therapies for pain. 2. Manual therapies and therapeutic exercises for ROM and strength. 3. Therapeutic exercises for gait and balance   TREATMENT PLAN:  Effective Dates: 2017 TO 2017. Frequency/Duration: 3 times a week for 4 weeks, then assess need for additional 4-8 weeks to progress strengthening toward discharge goals. GOALS: (Goals have been discussed and agreed upon with patient.)   Short-Term Functional Goals: Time Frame: 4-6 weeks*  1.  Report no more than minimal, intermittent 3/10 pain L knee with basic walking and ADL's, and score greater than 40/80 on the LEFS. MET 7/6/17  2. L knee PROM is greater than or equal to 0-115 degrees flexion. MET 7-6-17  3. L quad strength is 5/5 with good terminal extension strength for stability with walking and progressing into strengthening phase. MET 7-6-17  4. Walking without limp on level surfaces and up stairs with reciprocal pattern. Ongoing 7-6-17  Discharge Goals: Time Frame: 10-12 weeks   1. No more than 3/10 pain L knee with all normalized daily home, work, and  conditioning training activities, and score greater than 55/80 on the LEFS. 2. Demonstrate good L knee and LE functional quad strength with good control walking down stairs, slopes  3. Able to balance with good control in single-leg stand greater than 15 seconds with eyes open, greater than 10 seconds with eyes closed for improved dynamic stability. 4. Independent with [facility] for advanced knee strengthening. Rehabilitation Potential For Stated Goals: Good              The information in this section was collected on 6-7-17 (except where otherwise noted). HISTORY:   History of Present Injury/Illness (Reason for Referral): Long history of L knee pain beginning with a sports injury when in high school, then again in the [facility]. Worsening of knee pain and swelling limiting his activities over recent years. Was treated with injections and synovial fluid replacement with limited effects. DJD diagnosed and TKA recommended. This was done 5-17-17. He was hospitalized 2 days with [address] [place] [address] rehab, then discharged home with home health PT for 2 weeks. Past Medical History/Comorbidities: s/p L TKA 5-17-17. PMH from EMR includes:  Mr. Sarah Carey  has a past medical history of Benign prostatic hyperplasia; CAD (coronary artery disease); Depression; Diastolic dysfunction; Elevated serum creatinine (04/24/2017);  Former smoker; H/O echocardiogram (07/24/2014); Hypercholesteremia; Hypertension; Osteoarthritis; and Overactive bladder. He also has no past medical history of Adverse effect of anesthesia; Difficult intubation; Malignant hyperthermia due to anesthesia; Nausea & vomiting; or Pseudocholinesterase deficiency. Mr. Sam Encarnacion  has a past surgical history that includes urological (2013) and colonoscopy. Social History/Living Environment: Lives with his wife. Home has a flight of stairs to his home office. Prior Level of Function/Work/Activity: Works in sales with frequent driving trips. Was active with walking 30 minutes daily and strength training with a  the past 4-5 years. 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 Current Medications:  Tylenol prn. Protonix and 2 anti-biotics for 2 weeks to finish 6/30/17. Other meds from EMR include:     Current Outpatient Prescriptions:     acetaminophen (TYLENOL) 500 mg tablet, Take 2 Tabs by mouth every six (6) hours. (Patient taking differently: Take 2 Tabs by mouth every six (6) hours. Indications: Fever, Pain), Disp: 120 Tab, Rfl: 0    aspirin 81 mg chewable tablet, Take 1 Tab by mouth two (2) times a day., Disp: 60 Tab, Rfl: 0    cyclobenzaprine (FLEXERIL) 10 mg tablet, Take 0.5 Tabs by mouth three (3) times daily. , Disp: 60 Tab, Rfl: 0    senna-docusate (PERICOLACE) 8.6-50 mg per tablet, Take 2 Tabs by mouth daily. , Disp: 120 Tab, Rfl: 0    zolpidem (AMBIEN) 5 mg tablet, Take 1 Tab by mouth nightly as needed for Sleep. Max Daily Amount: 5 mg., Disp: 30 Tab, Rfl: 0    metoprolol tartrate (LOPRESSOR) 25 mg tablet, Take 25 mg by mouth two (2) times a day. Take DOS per anesthesia protocol., Disp: , Rfl:     sulindac (CLINORIL) 150 mg tablet, Take 150 mg by mouth two (2) times a day. Non-formulary.  Patient instructed to bring to the hospital on the DOS, in the original bottle, and give to nurse., Disp: , Rfl:     olopatadine (PATANASE) 0.6 % spry, 2 Squirts by Both Nostrils route two (2) times a day. Non-formulary. Patient instructed to bring to the hospital on the DOS, in the original bottle, and give to nurse., Disp: , Rfl:     mirabegron ER (MYRBETRIQ) 50 mg ER tablet, Take 50 mg by mouth nightly. Non-formulary. Patient instructed to bring to the hospital on the DOS, in the original bottle, and give to nurse., Disp: , Rfl:     atorvastatin (LIPITOR) 40 mg tablet, Take 40 mg by mouth nightly., Disp: , Rfl:     lisinopril (PRINIVIL, ZESTRIL) 10 mg tablet, Take 10 mg by mouth daily. , Disp: , Rfl:     isosorbide mononitrate ER (IMDUR) 30 mg tablet, Take 30 mg by mouth daily. Take DOS per anesthesia protocol., Disp: , Rfl:     ezetimibe (ZETIA) 10 mg tablet, Take 10 mg by mouth daily. , Disp: , Rfl:     fenofibrate (LOFIBRA) 160 mg tablet, Take 160 mg by mouth daily. , Disp: , Rfl:     FLUoxetine (PROZAC) 20 mg capsule, Take 20 mg by mouth daily. Take DOS per anesthesia protocol., Disp: , Rfl:    Date Last Reviewed:  7-24-17      Number of Personal Factors/Comorbidities that affect the Plan of Care: 1-2: MODERATE COMPLEXITY   EXAMINATION:   7/24/2017  Observation/Orthostatic Postural Assessment: Mild swelling noted to L knee. .    ROM:   LLE PROM  L Knee Flexion: 125  L Knee Extension: -2        Strength: Not measured. Functional Mobility: Walking on level ground: Good knee mechanics on L, no significant limp on L. Balance: Not assessed. Body Structures Involved:  1. Joints  2. Muscles Body Functions Affected:  1. Neuromusculoskeletal  2. Movement Related Activities and Participation Affected:  1. General Tasks and Demands  2. Mobility   Number of elements (examined above) that affect the Plan of Care: 4+: HIGH COMPLEXITY   CLINICAL PRESENTATION:   Presentation: Stable and uncomplicated: LOW COMPLEXITY   CLINICAL DECISION MAKING:   Outcome Measure:    Tool Used: Lower Extremity Functional Scale (LEFS)  Score:  Initial: 26/80 Most Recent: 57/80 (Date: 7-6-17 )   Interpretation of Score: 20 questions each scored on a 5 point scale with 0 representing \"extreme difficulty or unable to perform\" and 4 representing \"no difficulty\". The lower the score, the greater the functional disability. 80/80 represents no disability. Minimal detectable change is 9 points. Medical Necessity:   · Patient is expected to demonstrate progress in strength, range of motion and balance to return to independent basic mobility and progress to return to his recreational activity. .  Reason for Services/Other Comments:  · Patient continues to require skilled intervention due to the complexity of the surgical procedure, and need for safe, progressive rehab to return to his normal activity at home, work, and recreation actiivitie. Use of outcome tool(s) and clinical judgement create a POC that gives a: Clear prediction of patient's progress: LOW COMPLEXITY          TREATMENT:   (In addition to Assessment/Re-Assessment sessions the following treatments were rendered)  7/24/2017  Pre-treatment Symptoms/Complaints: Says his knee has been doing good. May has over-stressed it a little walking hills over the weekend. Sore to the outer hamstrings. Did a lot of walking over the weekend with some \"strenuous hill work\". Still walking between 8,000 and 9,000 steps per day. Swam some laps in the pool yesterday as well--free and breaststrokes about 10-12 laps total.  Pain: Initial:  No significant pain reported. Post Session: 0/10      Active warm up on bike x10', rpm's from 55 progressing to 80. Therapeutic Exercise: (50 Minutes): Exercise for knee ROM and flexibility with physiologic knee extension and flexion stretch, grade 4-, 3x-30\" each; and instruction and performance in Active Isolated Stretching to L posterior hip, lateral hip, hamstrings in 90/90 and SLR, and quads in side-lying, 3\"x10 each; and Gaston positions, 3\"x10 each.   Performed control and strength exercises as per grid below. Wobble board in supported to unsupported double-leg with control shifts in frontal and sagittal planes and circles; added 12-in larry step-over's, and cable resisted walk-outs in 4-directions; and added reactive control Dheeraj forward lunge step with short distance and dept. Strength exercises and resistances modified as indicated. HEP: He is to continue with his existing HEP. He verbalizes understanding. Date:  6/30/17 Date:  7/6/17 Date:  7-13-17 Date  7-19-17 Date  7-24-17   Activity/Exercise Parameters Parameters Parameters     Knee ROM/Flexibilty   As above As abvoe As above   Quad sets 2 x 15 1 x 20 DC to HEP     Short arc quads 2 x 15 2 x 15 2# 3# 6-10\" x10  -   Seated hamstring curls Green; 2 x 15 Blue 2 x 10 Machine, bilat  30# 2x15 Machine, bilat  40# 3x15 -   Heel slides 1 x 20 1 x 20 DC to HEP     Straight leg raise 2 x 10 2 x 10 - - -   Long arc quad  2x15  Machine, bilat  10# x15  20# 2x15 Machine, bilat  30# 3x15 -   Slantboard calf stretch 3 x 30\"  - - -   Step ups 4\", 2 x 10  - Lateral  4-in 3x5 ea -   Side-lying hip ABD  2 x 10 - - -   Standing HS curls  2 x 10 - - -   Shuttle Press   bilat   50# 1x15  75# 2x15 bilat  75# 1x15  87.5# 2x15 bilat  85# 2x15   Balance--Static   Single-leg   5x10\" ea - -   Balance--Dynamic   Arm pull, tandem  Green tubing  3x30 ea Wobble board x30 ea as above Wobble board x30 each; Walkouts with 10# cable at waist x10 each, 4-ways;  12-in larry step over's 3x5 ea;  Dheeraj forward lunge step 3x5 ea     Treatment/Session Assessment:    · Response to Treatment:  L knee is progressing very well, Good ROM. Strength is progressing. Has difficulty with neuromuscular control drills, but improves with practice. Good performance with the exercises done. · Compliance with Program/Exercises: Appears compliant. · Recommendations/Intent for next treatment session:  We will continue with knee motion treatment, progressive isolated and functional strengthening with land and pool exercises.    Total Treatment Duration: 50 minutes  PT Patient Time In/Time Out  Time In: 1430  Time Out: [address] [facility],[address], PT, MSPT, OCS

## 2017-07-26 ENCOUNTER — HOSPITAL ENCOUNTER (OUTPATIENT)
Dept: PHYSICAL THERAPY | Age: 67
Discharge: HOME | End: 2017-07-26
Payer: COMMERCIAL

## 2017-07-26 PROCEDURE — 97110 THERAPEUTIC EXERCISES: CPT

## 2017-07-26 NOTE — PROGRESS NOTES
Caryle Friar  : 1950 Therapy Center at [facility] VAIBHAV GOMEZ  [address] [facility], [address] [place] [address],[address], [place] [address].  [phone]   [phone]       OUTPATIENT PHYSICAL THERAPY:Daily Note 2017      ICD-10: Treatment Diagnosis: Pain in left knee (M25.562); Difficulty in walking, not elsewhere classified (R26.2); Presence of left artificial knee joint (Y50.514)  Precautions/Allergies: post-op TKA precautions. Neosporin [hydrocortisone] and Pcn [penicillins]   Fall Risk Score: 2 (? 5 = High Risk)  MD Orders: Eval and Treat TKA rehab (17) MEDICAL/REFERRING DIAGNOSIS: N49.748 History of knee replacement procedure L knee  History of knee replacement procedure of left knee [Z96.652]   DATE OF ONSET: 17  REFERRING PHYSICIAN: Mikey Fleischer, MD  RETURN PHYSICIAN APPOINTMENT: 17      ASSESSMENT:  Mr. Sarah Carey is 7 weeks s/p L TKA. He is making significant progress with his rehabilitation, and reports little to no pain. He has improved his ROM and strength since initial evaluation. Patient will benefit from continued PT to normalized his gait mechanics, improve his L knee ROM and facilitate continued improvement with participation in recreational and vocational activities. PROBLEM LIST (Impacting functional limitations):  1. Post-op pain and swelling L knee  2. Decreased ROM L knee   3. Decreased functional strength L knee/LE   4. Decreased gait skills INTERVENTIONS PLANNED:  1. Thermal and electric modalities, manual therapies for pain. 2. Manual therapies and therapeutic exercises for ROM and strength. 3. Therapeutic exercises for gait and balance   TREATMENT PLAN:  Effective Dates: 2017 TO 2017. Frequency/Duration: 3 times a week for 4 weeks, then assess need for additional 4-8 weeks to progress strengthening toward discharge goals. GOALS: (Goals have been discussed and agreed upon with patient.)   Short-Term Functional Goals: Time Frame: 4-6 weeks*  1.  Report no more than minimal, intermittent 3/10 pain L knee with basic walking and ADL's, and score greater than 40/80 on the LEFS. MET 7/6/17  2. L knee PROM is greater than or equal to 0-115 degrees flexion. MET 7-6-17  3. L quad strength is 5/5 with good terminal extension strength for stability with walking and progressing into strengthening phase. MET 7-6-17  4. Walking without limp on level surfaces and up stairs with reciprocal pattern. Ongoing 7-6-17  Discharge Goals: Time Frame: 10-12 weeks   1. No more than 3/10 pain L knee with all normalized daily home, work, and  conditioning training activities, and score greater than 55/80 on the LEFS. 2. Demonstrate good L knee and LE functional quad strength with good control walking down stairs, slopes  3. Able to balance with good control in single-leg stand greater than 15 seconds with eyes open, greater than 10 seconds with eyes closed for improved dynamic stability. 4. Independent with [facility] for advanced knee strengthening. Rehabilitation Potential For Stated Goals: Good              The information in this section was collected on 6-7-17 (except where otherwise noted). HISTORY:   History of Present Injury/Illness (Reason for Referral): Long history of L knee pain beginning with a sports injury when in high school, then again in the [facility]. Worsening of knee pain and swelling limiting his activities over recent years. Was treated with injections and synovial fluid replacement with limited effects. DJD diagnosed and TKA recommended. This was done 5-17-17. He was hospitalized 2 days with [address] [place] [address] rehab, then discharged home with home health PT for 2 weeks. Past Medical History/Comorbidities: s/p L TKA 5-17-17. PMH from EMR includes:  Mr. Lucero Wei  has a past medical history of Benign prostatic hyperplasia; CAD (coronary artery disease); Depression; Diastolic dysfunction; Elevated serum creatinine (04/24/2017);  Former smoker; H/O echocardiogram (07/24/2014); Hypercholesteremia; Hypertension; Osteoarthritis; and Overactive bladder. He also has no past medical history of Adverse effect of anesthesia; Difficult intubation; Malignant hyperthermia due to anesthesia; Nausea & vomiting; or Pseudocholinesterase deficiency. Mr. Mayank Eaton  has a past surgical history that includes urological (2013) and colonoscopy. Social History/Living Environment: Lives with his wife. Home has a flight of stairs to his home office. Prior Level of Function/Work/Activity: Works in sales with frequent driving trips. Was active with walking 30 minutes daily and strength training with a  the past 4-5 years. 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 Current Medications:  Tylenol prn. Protonix and 2 anti-biotics for 2 weeks to finish 6/30/17. Other meds from EMR include:     Current Outpatient Prescriptions:     acetaminophen (TYLENOL) 500 mg tablet, Take 2 Tabs by mouth every six (6) hours. (Patient taking differently: Take 2 Tabs by mouth every six (6) hours. Indications: Fever, Pain), Disp: 120 Tab, Rfl: 0    aspirin 81 mg chewable tablet, Take 1 Tab by mouth two (2) times a day., Disp: 60 Tab, Rfl: 0    cyclobenzaprine (FLEXERIL) 10 mg tablet, Take 0.5 Tabs by mouth three (3) times daily. , Disp: 60 Tab, Rfl: 0    senna-docusate (PERICOLACE) 8.6-50 mg per tablet, Take 2 Tabs by mouth daily. , Disp: 120 Tab, Rfl: 0    zolpidem (AMBIEN) 5 mg tablet, Take 1 Tab by mouth nightly as needed for Sleep. Max Daily Amount: 5 mg., Disp: 30 Tab, Rfl: 0    metoprolol tartrate (LOPRESSOR) 25 mg tablet, Take 25 mg by mouth two (2) times a day. Take DOS per anesthesia protocol., Disp: , Rfl:     sulindac (CLINORIL) 150 mg tablet, Take 150 mg by mouth two (2) times a day. Non-formulary.  Patient instructed to bring to the hospital on the DOS, in the original bottle, and give to nurse., Disp: , Rfl:     olopatadine (PATANASE) 0.6 % spry, 2 Squirts by Both Nostrils route two (2) times a day. Non-formulary. Patient instructed to bring to the hospital on the DOS, in the original bottle, and give to nurse., Disp: , Rfl:     mirabegron ER (MYRBETRIQ) 50 mg ER tablet, Take 50 mg by mouth nightly. Non-formulary. Patient instructed to bring to the hospital on the DOS, in the original bottle, and give to nurse., Disp: , Rfl:     atorvastatin (LIPITOR) 40 mg tablet, Take 40 mg by mouth nightly., Disp: , Rfl:     lisinopril (PRINIVIL, ZESTRIL) 10 mg tablet, Take 10 mg by mouth daily. , Disp: , Rfl:     isosorbide mononitrate ER (IMDUR) 30 mg tablet, Take 30 mg by mouth daily. Take DOS per anesthesia protocol., Disp: , Rfl:     ezetimibe (ZETIA) 10 mg tablet, Take 10 mg by mouth daily. , Disp: , Rfl:     fenofibrate (LOFIBRA) 160 mg tablet, Take 160 mg by mouth daily. , Disp: , Rfl:     FLUoxetine (PROZAC) 20 mg capsule, Take 20 mg by mouth daily. Take DOS per anesthesia protocol., Disp: , Rfl:    Date Last Reviewed:  7-24-17      Number of Personal Factors/Comorbidities that affect the Plan of Care: 1-2: MODERATE COMPLEXITY   EXAMINATION:   7/26/2017  Observation/Orthostatic Postural Assessment: Unchanged. ROM: Not measured. Strength: Not measured. Functional Mobility: Walking on level ground: Good knee mechanics on L, no significant limp on L. Balance: Not assessed. Body Structures Involved:  1. Joints  2. Muscles Body Functions Affected:  1. Neuromusculoskeletal  2. Movement Related Activities and Participation Affected:  1. General Tasks and Demands  2. Mobility   Number of elements (examined above) that affect the Plan of Care: 4+: HIGH COMPLEXITY   CLINICAL PRESENTATION:   Presentation: Stable and uncomplicated: LOW COMPLEXITY   CLINICAL DECISION MAKING:   Outcome Measure:    Tool Used: Lower Extremity Functional Scale (LEFS)  Score:  Initial: 26/80 Most Recent: 57/80 (Date: 7-6-17 )   Interpretation of Score: 20 questions each scored on a 5 point scale with 0 representing \"extreme difficulty or unable to perform\" and 4 representing \"no difficulty\". The lower the score, the greater the functional disability. 80/80 represents no disability. Minimal detectable change is 9 points. Medical Necessity:   · Patient is expected to demonstrate progress in strength, range of motion and balance to return to independent basic mobility and progress to return to his recreational activity. .  Reason for Services/Other Comments:  · Patient continues to require skilled intervention due to the complexity of the surgical procedure, and need for safe, progressive rehab to return to his normal activity at home, work, and recreation actiivitie. Use of outcome tool(s) and clinical judgement create a POC that gives a: Clear prediction of patient's progress: LOW COMPLEXITY          TREATMENT:   (In addition to Assessment/Re-Assessment sessions the following treatments were rendered)  7/26/2017  Pre-treatment Symptoms/Complaints: Knee is \"tight\" and a little sore from driving today. Just did a work trip to [employer]. Pain: Initial:   Post Session: 0/10      Active warm up on bike x10', level 3, rpm's from 55 progressing to 80. Therapeutic Exercise: (50 Minutes): Performed control and strength exercises as per grid below. Exercises modified as indicated with emphasis on progressing trunk and LQ stabilization. Verbal and visual cues for exercise technique and alignment control. HEP: He is to continue with his existing HEP. He verbalizes understanding.    Date:  6/30/17 Date:  7/6/17 Date:  7-13-17 Date  7-19-17 Date  7-24-17 Date  7-26-17   Activity/Exercise Parameters Parameters Parameters      Knee ROM/Flexibilty   As above As abvoe As above -   Quad sets 2 x 15 1 x 20 DC to HEP      Short arc quads 2 x 15 2 x 15 2# 3# 6-10\" x10  - -   Seated hamstring curls Green; 2 x 15 Blue 2 x 10 Machine, bilat  30# 2x15 Machine, bilat  40# 3x15 - Machine unilat  25# 2x15 ea   Heel slides 1 x 20 1 x 20 DC to HEP      Straight leg raise 2 x 10 2 x 10 - - -    Long arc quad  2x15  Machine, bilat  10# x15  20# 2x15 Machine, bilat  30# 3x15 - Machine unilat  10# 2x15 ea   Slantboard calf stretch 3 x 30\"  - - - -   Step ups 4\", 2 x 10  - Lateral  4-in 3x5 ea - -   Side-lying hip ABD  2 x 10 - - - -   Standing HS curls  2 x 10 - - - -   Shuttle Press   bilat   50# 1x15  75# 2x15 bilat  75# 1x15  87.5# 2x15 bilat  85# 2x15 -   Squats      bench squat  10# 1x15,  L bias x10  R bias x10   Balance--Static   Single-leg   5x10\" ea - - Single-leg   10# weight carry ipsilateral, contralateral 10\"x3 each L and R   Balance--Dynamic   Arm pull, tandem  Green tubing  3x30 ea Wobble board x30 ea as above Wobble board x30 each; Walkouts with 10# cable at waist x10 each, 4-ways;  12-in larry step over's 3x5 ea;  Dheeraj forward lunge step 3x5 ea Wobble board 4-ways x30 each;  12-in larry step over's 5x5 ea;  Step-to foam 3x5 ea, walk-to cecily 3x5 each, and step-to foam with 10# weight carry ipsilateral/contralateral 3x5 ea     Treatment/Session Assessment:    · Response to Treatment: Good performance with the exercises done. · Compliance with Program/Exercises: Appears compliant. · Recommendations/Intent for next treatment session: We will continue with knee motion treatment, progressive isolated and functional strengthening and control drills.     Total Treatment Duration: 50 minutes  PT Patient Time In/Time Out  Time In: 1430  Time Out: [address], PT, MSPT, OCS

## 2017-07-31 ENCOUNTER — HOSPITAL ENCOUNTER (OUTPATIENT)
Dept: PHYSICAL THERAPY | Age: 67
Discharge: HOME | End: 2017-07-31
Payer: COMMERCIAL

## 2017-07-31 PROCEDURE — 97140 MANUAL THERAPY 1/> REGIONS: CPT

## 2017-07-31 PROCEDURE — 97110 THERAPEUTIC EXERCISES: CPT

## 2017-07-31 NOTE — PROGRESS NOTES
Gómez Long  : 1950 [facility] VAIBHAV GOMEZ  [facility], [address] [place] [address],[address], [place] [address].  [phone]   [phone]       OUTPATIENT PHYSICAL THERAPY:Daily Note 2017      ICD-10: Treatment Diagnosis: Pain in left knee (M25.562); Difficulty in walking, not elsewhere classified (R26.2); Presence of left artificial knee joint (B92.641)  Precautions/Allergies: post-op TKA precautions. Neosporin [hydrocortisone] and Pcn [penicillins]   Fall Risk Score: 2 (? 5 = High Risk)  MD Orders: Eval and Treat TKA rehab (17) MEDICAL/REFERRING DIAGNOSIS: U87.924 History of knee replacement procedure L knee  History of knee replacement procedure of left knee [Z96.652]   DATE OF ONSET: 17  REFERRING PHYSICIAN: Susan Oconnor MD  RETURN PHYSICIAN APPOINTMENT: 17      ASSESSMENT:  Mr. Ranjit Villalba is 7 weeks s/p L TKA. He is making significant progress with his rehabilitation, and reports little to no pain. He has improved his ROM and strength since initial evaluation. Patient will benefit from continued PT to normalized his gait mechanics, improve his L knee ROM and facilitate continued improvement with participation in recreational and vocational activities. PROBLEM LIST (Impacting functional limitations):  1. Post-op pain and swelling L knee  2. Decreased ROM L knee   3. Decreased functional strength L knee/LE   4. Decreased gait skills INTERVENTIONS PLANNED:  1. Thermal and electric modalities, manual therapies for pain. 2. Manual therapies and therapeutic exercises for ROM and strength. 3. Therapeutic exercises for gait and balance   TREATMENT PLAN:  Effective Dates: 2017 TO 2017. Frequency/Duration: 3 times a week for 4 weeks, then assess need for additional 4-8 weeks to progress strengthening toward discharge goals. GOALS: (Goals have been discussed and agreed upon with patient.)   Short-Term Functional Goals: Time Frame: 4-6 weeks*  1.  Report no more than minimal, intermittent 3/10 pain L knee with basic walking and ADL's, and score greater than 40/80 on the LEFS. MET 7/6/17  2. L knee PROM is greater than or equal to 0-115 degrees flexion. MET 7-6-17  3. L quad strength is 5/5 with good terminal extension strength for stability with walking and progressing into strengthening phase. MET 7-6-17  4. Walking without limp on level surfaces and up stairs with reciprocal pattern. Ongoing 7-6-17  Discharge Goals: Time Frame: 10-12 weeks   1. No more than 3/10 pain L knee with all normalized daily home, work, and  conditioning training activities, and score greater than 55/80 on the LEFS. 2. Demonstrate good L knee and LE functional quad strength with good control walking down stairs, slopes  3. Able to balance with good control in single-leg stand greater than 15 seconds with eyes open, greater than 10 seconds with eyes closed for improved dynamic stability. 4. Independent with [facility] for advanced knee strengthening. Rehabilitation Potential For Stated Goals: Good              The information in this section was collected on 6-7-17 (except where otherwise noted). HISTORY:   History of Present Injury/Illness (Reason for Referral): Long history of L knee pain beginning with a sports injury when in high school, then again in the [facility]. Worsening of knee pain and swelling limiting his activities over recent years. Was treated with injections and synovial fluid replacement with limited effects. DJD diagnosed and TKA recommended. This was done 5-17-17. He was hospitalized 2 days with [address] [place] [address] rehab, then discharged home with home health PT for 2 weeks. Past Medical History/Comorbidities: s/p L TKA 5-17-17. PMH from EMR includes:  Mr. Nava Jarvis  has a past medical history of Benign prostatic hyperplasia; CAD (coronary artery disease); Depression; Diastolic dysfunction; Elevated serum creatinine (04/24/2017);  Former smoker; H/O echocardiogram (07/24/2014); Hypercholesteremia; Hypertension; Osteoarthritis; and Overactive bladder. He also has no past medical history of Adverse effect of anesthesia; Difficult intubation; Malignant hyperthermia due to anesthesia; Nausea & vomiting; or Pseudocholinesterase deficiency. Mr. Andrade Manjarrez  has a past surgical history that includes urological (2013) and colonoscopy. Social History/Living Environment: Lives with his wife. Home has a flight of stairs to his home office. Prior Level of Function/Work/Activity: Works in sales with frequent driving trips. Was active with walking 30 minutes daily and strength training with a  the past 4-5 years. Active with hunting, and works as a guide in [place]. 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 Current Medications:  Tylenol prn. Protonix and 2 anti-biotics for 2 weeks to finish 6/30/17. Other meds from EMR include:     Current Outpatient Prescriptions:     acetaminophen (TYLENOL) 500 mg tablet, Take 2 Tabs by mouth every six (6) hours. (Patient taking differently: Take 2 Tabs by mouth every six (6) hours. Indications: Fever, Pain), Disp: 120 Tab, Rfl: 0    aspirin 81 mg chewable tablet, Take 1 Tab by mouth two (2) times a day., Disp: 60 Tab, Rfl: 0    cyclobenzaprine (FLEXERIL) 10 mg tablet, Take 0.5 Tabs by mouth three (3) times daily. , Disp: 60 Tab, Rfl: 0    senna-docusate (PERICOLACE) 8.6-50 mg per tablet, Take 2 Tabs by mouth daily. , Disp: 120 Tab, Rfl: 0    zolpidem (AMBIEN) 5 mg tablet, Take 1 Tab by mouth nightly as needed for Sleep. Max Daily Amount: 5 mg., Disp: 30 Tab, Rfl: 0    metoprolol tartrate (LOPRESSOR) 25 mg tablet, Take 25 mg by mouth two (2) times a day. Take DOS per anesthesia protocol., Disp: , Rfl:     sulindac (CLINORIL) 150 mg tablet, Take 150 mg by mouth two (2) times a day. Non-formulary.  Patient instructed to bring to the hospital on the DOS, in the original bottle, and give to nurse., Disp: , Rfl:     olopatadine (PATANASE) 0.6 % spry, 2 Squirts by Both Nostrils route two (2) times a day. Non-formulary. Patient instructed to bring to the hospital on the DOS, in the original bottle, and give to nurse., Disp: , Rfl:     mirabegron ER (MYRBETRIQ) 50 mg ER tablet, Take 50 mg by mouth nightly. Non-formulary. Patient instructed to bring to the hospital on the DOS, in the original bottle, and give to nurse., Disp: , Rfl:     atorvastatin (LIPITOR) 40 mg tablet, Take 40 mg by mouth nightly., Disp: , Rfl:     lisinopril (PRINIVIL, ZESTRIL) 10 mg tablet, Take 10 mg by mouth daily. , Disp: , Rfl:     isosorbide mononitrate ER (IMDUR) 30 mg tablet, Take 30 mg by mouth daily. Take DOS per anesthesia protocol., Disp: , Rfl:     ezetimibe (ZETIA) 10 mg tablet, Take 10 mg by mouth daily. , Disp: , Rfl:     fenofibrate (LOFIBRA) 160 mg tablet, Take 160 mg by mouth daily. , Disp: , Rfl:     FLUoxetine (PROZAC) 20 mg capsule, Take 20 mg by mouth daily. Take DOS per anesthesia protocol., Disp: , Rfl:    Date Last Reviewed:  7-31-17      Number of Personal Factors/Comorbidities that affect the Plan of Care: 1-2: MODERATE COMPLEXITY   EXAMINATION:   7/31/2017  Observation/Orthostatic Postural Assessment: Unchanged. ROM: Not measured. Strength: Not measured. Functional Mobility: Walking on level ground: Good knee mechanics on L, no significant limp on L. Balance: Not assessed. Body Structures Involved:  1. Joints  2. Muscles Body Functions Affected:  1. Neuromusculoskeletal  2. Movement Related Activities and Participation Affected:  1. General Tasks and Demands  2. Mobility   Number of elements (examined above) that affect the Plan of Care: 4+: HIGH COMPLEXITY   CLINICAL PRESENTATION:   Presentation: Stable and uncomplicated: LOW COMPLEXITY   CLINICAL DECISION MAKING:   Outcome Measure:    Tool Used: Lower Extremity Functional Scale (LEFS)  Score:  Initial: 26/80 Most Recent: 57/80 (Date: 7-6-17 )   Interpretation of Score: 20 questions each scored on a 5 point scale with 0 representing \"extreme difficulty or unable to perform\" and 4 representing \"no difficulty\". The lower the score, the greater the functional disability. 80/80 represents no disability. Minimal detectable change is 9 points. Medical Necessity:   · Patient is expected to demonstrate progress in strength, range of motion and balance to return to independent basic mobility and progress to return to his recreational activity. .  Reason for Services/Other Comments:  · Patient continues to require skilled intervention due to the complexity of the surgical procedure, and need for safe, progressive rehab to return to his normal activity at home, work, and recreation actiivitie. Use of outcome tool(s) and clinical judgement create a POC that gives a: Clear prediction of patient's progress: LOW COMPLEXITY          TREATMENT:   (In addition to Assessment/Re-Assessment sessions the following treatments were rendered)  7/31/2017  Pre-treatment Symptoms/Complaints: Knee is doing good, but still gets a little pain to the posterolateral knee. Continues to do his HEP and walk for exercise. Says he has been doing active stretching to quads, hamstrings, and calf in standing before he does his walks. Pain: Initial:  No pain reported. Post Session: 0/10      Active warm up on bike x10', level 3. Manual Therapies: (10 Minutes): Brief fascial and soft tissue mobilization to skin around L knee and lateral hamstring tendon for soft tissue restriction. Positional Release technique to L lateral quads at mid point and distal, to lateral head of gastroc, and biceps femoris for muscle restriction to motion. Physiologic mobilization to L knee extension and flexion in supine and flexion in prone for stiffness, grade 4- to 4, 3x30\" each.    Therapeutic Exercise: (30 Minutes): Knee motion with assisted Active Isolated Stretching to L gastroc., posterior hip, lateral hip, hamstrings in 90/90 and SLR, and quads/hip flexors in prone, side-lying, and Gaston positions, 3\"x10 each. Performed core and LE strength exercises as per grid below. Exercises modified as indicated. Verbal and visual cues for exercise technique and alignment control. HEP: He is to continue with his existing HEP. He verbalizes understanding. Date:  6/30/17 Date:  7/6/17 Date:  7-13-17 Date  7-19-17 Date  7-24-17 Date  7-26-17 Date  7-31-17   Activity/Exercise Parameters Parameters Parameters       Knee ROM/Flexibilty   As above As abvoe As above - As above   Quad sets 2 x 15 1 x 20 DC to HEP       Short arc quads 2 x 15 2 x 15 2# 3# 6-10\" x10  - -    Seated hamstring curls Green; 2 x 15 Blue 2 x 10 Machine, bilat  30# 2x15 Machine, bilat  40# 3x15 - Machine unilat  25# 2x15 ea Machine unilat  30# 3x10 ea   Heel slides 1 x 20 1 x 20 DC to HEP       Straight leg raise 2 x 10 2 x 10 - - -  -   Long arc quad  2x15  Machine, bilat  10# x15  20# 2x15 Machine, bilat  30# 3x15 - Machine unilat  10# 2x15 ea Machine unilat  15# 3x10 ea   Slantboard calf stretch 3 x 30\"  - - - - -   Step ups 4\", 2 x 10  - Lateral  4-in 3x5 ea - - -   Side-lying hip ABD  2 x 10 - - - - -   Standing HS curls  2 x 10 - - - - -   Shuttle Press   bilat   50# 1x15  75# 2x15 bilat  75# 1x15  87.5# 2x15 bilat  85# 2x15 - unilat  50# 3x10 ea   Squats      bench squat  10# 1x15,  L bias x10  R bias x10 Bench squat  12# 1x15 even feet,   L bias 12# 1x15  R bias12# 1x15   Balance--Static   Single-leg   5x10\" ea - - Single-leg   10# weight carry ipsilateral, contralateral 10\"x3 each L and R -   Balance--Dynamic   Arm pull, tandem  Green tubing  3x30 ea Wobble board x30 ea as above Wobble board x30 each;   Walkouts with 10# cable at waist x10 each, 4-ways;  12-in larry step over's 3x5 ea;  Dheeraj forward lunge step 3x5 ea Wobble board 4-ways x30 each;  12-in larry step over's 5x5 ea;  Step-to foam 3x5 ea, walk-to cecily 3x5 each, and step-to foam with 10# weight carry ipsilateral/contralateral 3x5 ea -   Core Strength:        Upper ab curl up/sit up x20;  Prone ext L3  10\"x5;  Prone Plank 10\"x5; lower ab L. 1 x10;  Bridge 10\"x5     Treatment/Session Assessment:    · Response to Treatment: Good performance with the exercises done. Mild stiffness to L knee extension noted. This may be causing some of the lateral hamstring tendon straining as he is doing some aggressive walking for exercise--long strides, up/down hill intervals. · Compliance with Program/Exercises: Appears compliant. · Recommendations/Intent for next treatment session: We will continue with knee motion treatment, progressive isolated and functional strengthening and control drills.     Total Treatment Duration: 40 minutes  PT Patient Time In/Time Out  Time In: 1350  Time Out: [address], PT, MSPT, OCS

## 2017-08-04 ENCOUNTER — HOSPITAL ENCOUNTER (OUTPATIENT)
Dept: PHYSICAL THERAPY | Age: 67
Discharge: HOME | End: 2017-08-04
Payer: COMMERCIAL

## 2017-08-04 PROCEDURE — 97110 THERAPEUTIC EXERCISES: CPT

## 2017-08-04 NOTE — PROGRESS NOTES
Dafne Luz  : 1950 [facility] VA[facility]HAV GOMEZ  [address] [facility], [address] [place] [address],[address], [place] [address].  [phone]   [phone]       OUTPATIENT PHYSICAL THERAPY:Daily Note 2017      ICD-10: Treatment Diagnosis: Pain in left knee (M25.562); Difficulty in walking, not elsewhere classified (R26.2); Presence of left artificial knee joint (I95.637)  Precautions/Allergies: post-op TKA precautions. Neosporin [hydrocortisone] and Pcn [penicillins]   Fall Risk Score: 2 (? 5 = High Risk)  MD Orders: Eval and Treat TKA rehab (17) MEDICAL/REFERRING DIAGNOSIS: V08.933 History of knee replacement procedure L knee  History of knee replacement procedure of left knee [Z96.652]   DATE OF ONSET: 17  REFERRING PHYSICIAN: Luan Bustamante MD  RETURN PHYSICIAN APPOINTMENT: 17      ASSESSMENT:  Mr. Nava Jarvis is 7 weeks s/p L TKA. He is making significant progress with his rehabilitation, and reports little to no pain. He has improved his ROM and strength since initial evaluation. Patient will benefit from continued PT to normalized his gait mechanics, improve his L knee ROM and facilitate continued improvement with participation in recreational and vocational activities. PROBLEM LIST (Impacting functional limitations):  1. Post-op pain and swelling L knee  2. Decreased ROM L knee   3. Decreased functional strength L knee/LE   4. Decreased gait skills INTERVENTIONS PLANNED:  1. Thermal and electric modalities, manual therapies for pain. 2. Manual therapies and therapeutic exercises for ROM and strength. 3. Therapeutic exercises for gait and balance   TREATMENT PLAN:  Effective Dates: 2017 TO 2017. Frequency/Duration: 3 times a week for 4 weeks, then assess need for additional 4-8 weeks to progress strengthening toward discharge goals. GOALS: (Goals have been discussed and agreed upon with patient.)   Short-Term Functional Goals: Time Frame: 4-6 weeks*  1.  Report no more than minimal, intermittent 3/10 pain L knee with basic walking and ADL's, and score greater than 40/80 on the LEFS. MET 7/6/17  2. L knee PROM is greater than or equal to 0-115 degrees flexion. MET 7-6-17  3. L quad strength is 5/5 with good terminal extension strength for stability with walking and progressing into strengthening phase. MET 7-6-17  4. Walking without limp on level surfaces and up stairs with reciprocal pattern. Ongoing 7-6-17  Discharge Goals: Time Frame: 10-12 weeks   1. No more than 3/10 pain L knee with all normalized daily home, work, and  conditioning training activities, and score greater than 55/80 on the LEFS. 2. Demonstrate good L knee and LE functional quad strength with good control walking down stairs, slopes  3. Able to balance with good control in single-leg stand greater than 15 seconds with eyes open, greater than 10 seconds with eyes closed for improved dynamic stability. 4. Independent with [facility] for advanced knee strengthening. Rehabilitation Potential For Stated Goals: Good              The information in this section was collected on 6-7-17 (except where otherwise noted). HISTORY:   History of Present Injury/Illness (Reason for Referral): Long history of L knee pain beginning with a sports injury when in high school, then again in the [facility]. Worsening of knee pain and swelling limiting his activities over recent years. Was treated with injections and synovial fluid replacement with limited effects. DJD diagnosed and TKA recommended. This was done 5-17-17. He was hospitalized 2 days with [address] [place] [address] rehab, then discharged home with home health PT for 2 weeks. Past Medical History/Comorbidities: s/p L TKA 5-17-17. PMH from EMR includes:  Mr. Sathish Ro  has a past medical history of Benign prostatic hyperplasia; CAD (coronary artery disease); Depression; Diastolic dysfunction; Elevated serum creatinine (04/24/2017);  Former smoker; H/O echocardiogram (07/24/2014); Hypercholesteremia; Hypertension; Osteoarthritis; and Overactive bladder. He also has no past medical history of Adverse effect of anesthesia; Difficult intubation; Malignant hyperthermia due to anesthesia; Nausea & vomiting; or Pseudocholinesterase deficiency. Mr. Lexie Gillis  has a past surgical history that includes urological (2013) and colonoscopy. Social History/Living Environment: Lives with his wife. Home has a flight of stairs to his home office. Prior Level of Function/Work/Activity: Works in sales with frequent driving trips. Was active with walking 30 minutes daily and strength training with a  the past 4-5 years. 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 Current Medications:  Tylenol prn. Protonix and 2 anti-biotics for 2 weeks to finish 6/30/17. Other meds from EMR include:     Current Outpatient Prescriptions:     acetaminophen (TYLENOL) 500 mg tablet, Take 2 Tabs by mouth every six (6) hours. (Patient taking differently: Take 2 Tabs by mouth every six (6) hours. Indications: Fever, Pain), Disp: 120 Tab, Rfl: 0    aspirin 81 mg chewable tablet, Take 1 Tab by mouth two (2) times a day., Disp: 60 Tab, Rfl: 0    cyclobenzaprine (FLEXERIL) 10 mg tablet, Take 0.5 Tabs by mouth three (3) times daily. , Disp: 60 Tab, Rfl: 0    senna-docusate (PERICOLACE) 8.6-50 mg per tablet, Take 2 Tabs by mouth daily. , Disp: 120 Tab, Rfl: 0    zolpidem (AMBIEN) 5 mg tablet, Take 1 Tab by mouth nightly as needed for Sleep. Max Daily Amount: 5 mg., Disp: 30 Tab, Rfl: 0    metoprolol tartrate (LOPRESSOR) 25 mg tablet, Take 25 mg by mouth two (2) times a day. Take DOS per anesthesia protocol., Disp: , Rfl:     sulindac (CLINORIL) 150 mg tablet, Take 150 mg by mouth two (2) times a day. Non-formulary.  Patient instructed to bring to the hospital on the DOS, in the original bottle, and give to nurse., Disp: , Rfl:     olopatadine (PATANASE) 0.6 % spry, 2 Squirts by Both Nostrils route two (2) times a day. Non-formulary. Patient instructed to bring to the hospital on the DOS, in the original bottle, and give to nurse., Disp: , Rfl:     mirabegron ER (MYRBETRIQ) 50 mg ER tablet, Take 50 mg by mouth nightly. Non-formulary. Patient instructed to bring to the hospital on the DOS, in the original bottle, and give to nurse., Disp: , Rfl:     atorvastatin (LIPITOR) 40 mg tablet, Take 40 mg by mouth nightly., Disp: , Rfl:     lisinopril (PRINIVIL, ZESTRIL) 10 mg tablet, Take 10 mg by mouth daily. , Disp: , Rfl:     isosorbide mononitrate ER (IMDUR) 30 mg tablet, Take 30 mg by mouth daily. Take DOS per anesthesia protocol., Disp: , Rfl:     ezetimibe (ZETIA) 10 mg tablet, Take 10 mg by mouth daily. , Disp: , Rfl:     fenofibrate (LOFIBRA) 160 mg tablet, Take 160 mg by mouth daily. , Disp: , Rfl:     FLUoxetine (PROZAC) 20 mg capsule, Take 20 mg by mouth daily. Take DOS per anesthesia protocol., Disp: , Rfl:    Date Last Reviewed:  7-31-17      Number of Personal Factors/Comorbidities that affect the Plan of Care: 1-2: MODERATE COMPLEXITY   EXAMINATION:   8/4/2017  Observation/Orthostatic Postural Assessment: Unchanged. ROM: Not measured. LLE PROM  L Knee Flexion: 125  L Knee Extension: -2        Strength: Not measured. Functional Mobility: Walking on level ground: Good knee mechanics on L, no significant limp on L. Balance:Static single leg balance, firm/eyes open: L=10\", R>15\"         Body Structures Involved:  1. Joints  2. Muscles Body Functions Affected:  1. Neuromusculoskeletal  2. Movement Related Activities and Participation Affected:  1. General Tasks and Demands  2. Mobility   Number of elements (examined above) that affect the Plan of Care: 4+: HIGH COMPLEXITY   CLINICAL PRESENTATION:   Presentation: Stable and uncomplicated: LOW COMPLEXITY   CLINICAL DECISION MAKING:   Outcome Measure:    Tool Used: Lower Extremity Functional Scale (LEFS)  Score: Initial: 26/80 Most Recent: 57/80 (Date: 7-6-17 )   Interpretation of Score: 20 questions each scored on a 5 point scale with 0 representing \"extreme difficulty or unable to perform\" and 4 representing \"no difficulty\". The lower the score, the greater the functional disability. 80/80 represents no disability. Minimal detectable change is 9 points. Medical Necessity:   · Patient is expected to demonstrate progress in strength, range of motion and balance to return to independent basic mobility and progress to return to his recreational activity. .  Reason for Services/Other Comments:  · Patient continues to require skilled intervention due to the complexity of the surgical procedure, and need for safe, progressive rehab to return to his normal activity at home, work, and recreation actiivitie. Use of outcome tool(s) and clinical judgement create a POC that gives a: Clear prediction of patient's progress: LOW COMPLEXITY          TREATMENT:   (In addition to Assessment/Re-Assessment sessions the following treatments were rendered)  8/4/2017  Pre-treatment Symptoms/Complaints: Says his knee is doing very good. Took a break this week from the intensive hill walking intervals he had been doing, so the knee feeling much better. Pain: Initial:  No pain reported. Post Session: 0/10      Active warm up on bike x10', level 3. Manual Therapies: (5 Minutes): Brief fascial and soft tissue mobilization to skin around L knee and lateral hamstring tendon for soft tissue restriction. Physiologic mobilization to L knee extension and flexion in supine and flexion in prone for stiffness, grade 4- to 4, 3x30\" each.    Therapeutic Exercise: (40 Minutes): Knee motion with passive physilogic knee extension and flexion, grade 4- - to 4-, 3x30\" each; and assisted Active Isolated Stretching to L gastroc., posterior hip, hip adductors, lateral hip, hamstrings in 90/90 and SLR, and quads/hip flexors in side-lying and Gaston positions, 3\"x10 each. Instruction and performance in quadruped respiratory belly lift to change hip flexor and back extensor tone, with active spine flex/ext ROM, then 3x 5 breath holds. Performed core and LE stabilization exercises as per grid below. Exercises modified as indicated. Verbal and visual cues for exercise technique and alignment control. HEP: He is to continue with his existing HEP, and is to work on single-leg balance. He verbalizes understanding. Date:  6/30/17 Date:  7/6/17 Date:  7-13-17 Date  7-19-17 Date  7-24-17 Date  7-26-17 Date  7-31-17 Date  8-4-17   Activity/Exercise Parameters Parameters Parameters        Knee ROM/Flexibilty   As above As abvoe As above - As above As above   Quad sets 2 x 15 1 x 20 DC to HEP        Short arc quads 2 x 15 2 x 15 2# 3# 6-10\" x10  - -  -   Seated hamstring curls Green; 2 x 15 Blue 2 x 10 Machine, bilat  30# 2x15 Machine, bilat  40# 3x15 - Machine unilat  25# 2x15 ea Machine unilat  30# 3x10 ea -   Heel slides 1 x 20 1 x 20 DC to HEP     --   Straight leg raise 2 x 10 2 x 10 - - -  - -   Long arc quad  2x15  Machine, bilat  10# x15  20# 2x15 Machine, bilat  30# 3x15 - Machine unilat  10# 2x15 ea Machine unilat  15# 3x10 ea -   Slantboard calf stretch 3 x 30\"  - - - - - -   Step ups 4\", 2 x 10  - Lateral  4-in 3x5 ea - - - -   Side-lying hip ABD  2 x 10 - - - - - -   Standing HS curls  2 x 10 - - - - - -   Shuttle Press   bilat   50# 1x15  75# 2x15 bilat  75# 1x15  87.5# 2x15 bilat  85# 2x15 - unilat  50# 3x10 ea -   Squats      bench squat  10# 1x15,  L bias x10  R bias x10 Bench squat  12# 1x15 even feet,   L bias 12# 1x15  R bias12# 1x15 -   Balance--Static   Single-leg   5x10\" ea - - Single-leg   10# weight carry ipsilateral, contralateral 10\"x3 each L and R - Single-leg static 3x10-15\" ea;   With 10# weight carry ipsilateral, contralateral 10\"x3 each L and R   Balance--Dynamic   Arm pull, tandem  Green tubing  3x30 ea Wobble board x30 ea as above [employer] x30 each; Walkouts with 10# cable at waist x10 each, 4-ways;  12-in larry step over's 3x5 ea;  Dheeraj forward lunge step 3x5 ea Wobble board 4-ways x30 each;  12-in larry step over's 5x5 ea;  Step-to foam 3x5 ea, walk-to cecily 3x5 each, and step-to foam with 10# weight carry ipsilateral/contralateral 3x5 ea - Single stepping with 10# weight carry ipsilateral, contralateral 2x10 each L and R   Core Strength:        Upper ab curl up/sit up x20;  Prone ext L3  10\"x5;  Prone Plank 10\"x5; lower ab L. 1 x10;  Bridge 10\"x5 myokin 90/90 hip lift with pascale-bridge 3x10     Treatment/Session Assessment:    · Response to Treatment: Good performance with the exercises done. Mild stiffness to L knee extension noted. Improved soreness reports today. Some difficulty with balance and control. · Compliance with Program/Exercises: Appears compliant. · Recommendations/Intent for next treatment session: We will continue with knee motion treatment, progressive isolated and functional strengthening and control drills.     Total Treatment Duration: 45 minutes  PT Patient Time In/Time Out  Time In: 1330  Time Out: [address], PT, MSPT, OCS

## 2017-08-07 ENCOUNTER — HOSPITAL ENCOUNTER (OUTPATIENT)
Dept: PHYSICAL THERAPY | Age: 67
Discharge: HOME | End: 2017-08-07
Payer: COMMERCIAL

## 2017-08-07 PROCEDURE — 97110 THERAPEUTIC EXERCISES: CPT

## 2017-08-07 PROCEDURE — 97140 MANUAL THERAPY 1/> REGIONS: CPT

## 2017-08-07 NOTE — PROGRESS NOTES
Lu Anderson  : 1950 [facility] VAIBHAV GOMEZ  [facility], [address] [place] [address],[address], [place] [address].  [phone]   [phone]       OUTPATIENT PHYSICAL THERAPY:Daily Note and Progress Report 2017      ICD-10: Treatment Diagnosis: Pain in left knee (M25.562); Difficulty in walking, not elsewhere classified (R26.2); Presence of left artificial knee joint (P89.341)  Precautions/Allergies: post-op TKA precautions. Neosporin [hydrocortisone] and Pcn [penicillins]   Fall Risk Score: 2 (? 5 = High Risk)  MD Orders: Eval and Treat TKA rehab (17) MEDICAL/REFERRING DIAGNOSIS: C56.235 History of knee replacement procedure L knee  History of knee replacement procedure of left knee [Z96.652]   DATE OF ONSET: 17  REFERRING PHYSICIAN: Alisson Ozuna MD  RETURN PHYSICIAN APPOINTMENT: 17     PROGRESS ASSESSMENT (17):  Mr. Hipolito Raymundo has attended 21 visits to date. He is now nearly 3 months s/p L TKA. He continues to make very good progress. He has minimal pain reports to the L knee. He has good ROM at 0 to 125 deg flexion after motion treatment. He has good strength to the hip and knee, but is still weaker than the R. Functionally, he is doing well with basic mobility and walking skill on level ground. He still has mild limp on L noted on stairs and at times level ground, but this appears habitual vs antalgic. He demonstrates neuromuscular control deficits with static and dynamic balance, but these are improving. He is progressing toward his discharge goals. He will benefit from continued PT to further progress strength and balance before we discharge him to his . 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 He will needed guided re-strengthening for protection of the new joint. PROBLEM LIST (Impacting functional limitations):  1. Post-op pain and swelling L knee  2.  Decreased ROM L knee   3. Decreased functional strength L knee/LE   4. Decreased gait skills INTERVENTIONS PLANNED:  1. Thermal and electric modalities, manual therapies for pain. 2. Manual therapies and therapeutic exercises for ROM and strength. 3. Therapeutic exercises for gait and balance   TREATMENT PLAN:  Effective Dates: 6/7/2017 TO 9/6/2017. Frequency/Duration: 3 times a week for 4 weeks, then assess need for additional 4-8 weeks to progress strengthening toward discharge goals. GOALS: (Goals have been discussed and agreed upon with patient.)   Short-Term Functional Goals: Time Frame: 4-6 weeks*  1. Report no more than minimal, intermittent 3/10 pain L knee with basic walking and ADL's, and score greater than 40/80 on the LEFS. MET 7/6/17  2. L knee PROM is greater than or equal to 0-115 degrees flexion. MET 7-6-17  3. L quad strength is 5/5 with good terminal extension strength for stability with walking and progressing into strengthening phase. MET 7-6-17  4. Walking without limp on level surfaces and up stairs with reciprocal pattern. Met 8-7-17  Discharge Goals: Time Frame: 10-12 weeks   1. No more than 3/10 pain L knee with all normalized daily home, work, and  conditioning training activities, and score greater than 55/80 on the LEFS. Progressing and ngoing 8-7-17  2. Demonstrate good L knee and LE functional quad strength with good control walking down stairs, slopes Progressing and ngoing 8-7-17  3. Able to balance with good control in single-leg stand greater than 15 seconds with eyes open, greater than 10 seconds with eyes closed for improved dynamic stability. Progressing and ngoing 8-7-17  4. Independent with [facility] for advanced knee strengthening. Progressing and ngoing 8-7-17  Rehabilitation Potential For Stated Goals: Good              The information in this section was collected on 6-7-17 (except where otherwise noted).   HISTORY:   History of Present Injury/Illness (Reason for Referral): Long history of L knee pain beginning with a sports injury when in high school, then again in the [facility]. Worsening of knee pain and swelling limiting his activities over recent years. Was treated with injections and synovial fluid replacement with limited effects. DJD diagnosed and TKA recommended. This was done 5-17-17. He was hospitalized 2 days with [address] rehab, then discharged home with home health PT for 2 weeks. Past Medical History/Comorbidities: s/p L TKA 5-17-17. PMH from EMR includes:  Mr. Sathish Ro  has a past medical history of Benign prostatic hyperplasia; CAD (coronary artery disease); Depression; Diastolic dysfunction; Elevated serum creatinine (04/24/2017); Former smoker; H/O echocardiogram (07/24/2014); Hypercholesteremia; Hypertension; Osteoarthritis; and Overactive bladder. He also has no past medical history of Adverse effect of anesthesia; Difficult intubation; Malignant hyperthermia due to anesthesia; Nausea & vomiting; or Pseudocholinesterase deficiency. Mr. Sathish Ro  has a past surgical history that includes urological (2013) and colonoscopy. Social History/Living Environment: Lives with his wife. Home has a flight of stairs to his home office. Prior Level of Function/Work/Activity: Works in sales with frequent driving trips. Was active with walking 30 minutes daily and strength training with a  the past 4-5 years. 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 Current Medications:  Tylenol prn. Protonix and 2 anti-biotics for 2 weeks to finish 6/30/17. Other meds from EMR include:     Current Outpatient Prescriptions:     acetaminophen (TYLENOL) 500 mg tablet, Take 2 Tabs by mouth every six (6) hours. (Patient taking differently: Take 2 Tabs by mouth every six (6) hours.  Indications: Fever, Pain), Disp: 120 Tab, Rfl: 0    aspirin 81 mg chewable tablet, Take 1 Tab by mouth two (2) times a day., Disp: 60 Tab, Rfl: 0   cyclobenzaprine (FLEXERIL) 10 mg tablet, Take 0.5 Tabs by mouth three (3) times daily. , Disp: 60 Tab, Rfl: 0    senna-docusate (PERICOLACE) 8.6-50 mg per tablet, Take 2 Tabs by mouth daily. , Disp: 120 Tab, Rfl: 0    zolpidem (AMBIEN) 5 mg tablet, Take 1 Tab by mouth nightly as needed for Sleep. Max Daily Amount: 5 mg., Disp: 30 Tab, Rfl: 0    metoprolol tartrate (LOPRESSOR) 25 mg tablet, Take 25 mg by mouth two (2) times a day. Take DOS per anesthesia protocol., Disp: , Rfl:     sulindac (CLINORIL) 150 mg tablet, Take 150 mg by mouth two (2) times a day. Non-formulary. Patient instructed to bring to the hospital on the DOS, in the original bottle, and give to nurse., Disp: , Rfl:     olopatadine (PATANASE) 0.6 % spry, 2 Squirts by Both Nostrils route two (2) times a day. Non-formulary. Patient instructed to bring to the hospital on the DOS, in the original bottle, and give to nurse., Disp: , Rfl:     mirabegron ER (MYRBETRIQ) 50 mg ER tablet, Take 50 mg by mouth nightly. Non-formulary. Patient instructed to bring to the hospital on the DOS, in the original bottle, and give to nurse., Disp: , Rfl:     atorvastatin (LIPITOR) 40 mg tablet, Take 40 mg by mouth nightly., Disp: , Rfl:     lisinopril (PRINIVIL, ZESTRIL) 10 mg tablet, Take 10 mg by mouth daily. , Disp: , Rfl:     isosorbide mononitrate ER (IMDUR) 30 mg tablet, Take 30 mg by mouth daily. Take DOS per anesthesia protocol., Disp: , Rfl:     ezetimibe (ZETIA) 10 mg tablet, Take 10 mg by mouth daily. , Disp: , Rfl:     fenofibrate (LOFIBRA) 160 mg tablet, Take 160 mg by mouth daily. , Disp: , Rfl:     FLUoxetine (PROZAC) 20 mg capsule, Take 20 mg by mouth daily. Take DOS per anesthesia protocol., Disp: , Rfl:    Date Last Reviewed:  8-7-17      Number of Personal Factors/Comorbidities that affect the Plan of Care: 1-2: MODERATE COMPLEXITY   EXAMINATION:   8/7/2017  Observation/Orthostatic Postural Assessment: Walks into clinic without distress. Well-healed surgical scar noted anterior L knee. Mild swelling to L knee. Mild atrophy noted to quads and calf on L.      ROM:   LLE PROM  L Knee Flexion: 127 (post motion treatment)  L Knee Extension: 0 (post motion treatment)        Strength:    L Hip and Knee grossly 5/5 with manual testing. Functional Mobility: Walking on level ground: Good knee mechanics on L, no significant limp on L. Stair walking: up/down with reciprocal pattern with mild limp on L stance phase. Balance:Static single leg balance, firm/eyes open: L=10\", R>15\"         Body Structures Involved:  1. Joints  2. Muscles Body Functions Affected:  1. Neuromusculoskeletal  2. Movement Related Activities and Participation Affected:  1. General Tasks and Demands  2. Mobility   Number of elements (examined above) that affect the Plan of Care: 4+: HIGH COMPLEXITY   CLINICAL PRESENTATION:   Presentation: Stable and uncomplicated: LOW COMPLEXITY   CLINICAL DECISION MAKING:   Outcome Measure: Tool Used: Lower Extremity Functional Scale (LEFS)  Score:  Initial: 26/80 Most Recent: 57/80 (Date: 7-6-17 )   Interpretation of Score: 20 questions each scored on a 5 point scale with 0 representing \"extreme difficulty or unable to perform\" and 4 representing \"no difficulty\". The lower the score, the greater the functional disability. 80/80 represents no disability. Minimal detectable change is 9 points. Medical Necessity:   · Patient is expected to demonstrate progress in strength, range of motion and balance to return to independent basic mobility and progress to return to his recreational activity. .  Reason for Services/Other Comments:  · Patient continues to require skilled intervention due to the complexity of the surgical procedure, and need for safe, progressive rehab to return to his normal activity at home, work, and recreation actiivitie.    Use of outcome tool(s) and clinical judgement create a POC that gives a: Clear prediction of patient's progress: LOW COMPLEXITY          TREATMENT:   (In addition to Assessment/Re-Assessment sessions the following treatments were rendered)  8/7/2017  Pre-treatment Symptoms/Complaints: Says his knee is doing good. No significant soreness to report. Planned to get in the pool to exercise over the weekend, but didn't get a chance. Has been doing some walking and work on balance. Pain: Initial:  No pain reported. Post Session: 0/10      Manual Therapies: (15 Minutes): Brief fascial and soft tissue mobilization to skin around L knee and lateral hamstring tendon with myofascial deep release for soft tissue restriction. Postitional Release technique to L mid central and lateral quads and lateral hamstring and gastroc for muscle restriction to motion. Physiologic mobilization to L knee extension and flexion in supine for stiffness, grade 4- to 4, 3x30\" each. Therapeutic Exercise: (30 Minutes): Knee motion with assisted Active Isolated Stretching to L gastroc., posterior hip, hip adductors, lateral hip, hamstrings in 90/90 and SLR, and quads/hip flexors in side-lying and Gaston positions, 3\"x10 each. Performed exercises for knee strength and balance/control as per grid below. Exercises modified as indicated. Minimal verbal and visual cues for exercise technique and alignment. HEP: He is to continue with his existing HEP. He verbalizes understanding.    Date  7-26-17 Date  7-31-17 Date  8-4-17 Date  8-7-17   Activity/Exercise       Knee ROM/Flexibilty - As above As above As above   Quad sets    -   Short arc quads -  - -   Seated hamstring curls Machine unilat  25# 2x15 ea Machine unilat  30# 3x10 ea - unilat  30# 2x10-15 ea   Heel slides   -- -   Straight leg raise  - - -   Long arc quad  Machine unilat  10# 2x15 ea Machine unilat  15# 3x10 ea - unilat  15# 3x15 ea   Slantboard calf stretch - - - -   Step ups - - - -   Side-lying hip ABD - - - -   Standing HS curls - - - -   Shuttle Press - unilat  50# 3x10 ea - unilat  75# 3x10 ea   Squats bench squat  10# 1x15,  L bias x10  R bias x10 Bench squat  12# 1x15 even feet,   L bias 12# 1x15  R bias12# 1x15 - Bench squat  12# 1x15 even feet   Balance--Static Single-leg   10# weight carry ipsilateral, contralateral 10\"x3 each L and R - Single-leg static 3x10-15\" ea; With 10# weight carry ipsilateral, contralateral 10\"x3 each L and R -   Balance--Dynamic Wobble board 4-ways x30 each;  12-in larry step over's 5x5 ea;  Step-to foam 3x5 ea, walk-to cecily 3x5 each, and step-to foam with 10# weight carry ipsilateral/contralateral 3x5 ea - Single stepping with 10# weight carry ipsilateral, contralateral 2x10 each L and R step-to foam with 12# weight carry ipsilateral/contralateral 3x10 ea   Core Strength:   Upper ab curl up/sit up x20;  Prone ext L3  10\"x5;  Prone Plank 10\"x5; lower ab L. 1 x10;  Bridge 10\"x5 myokin 90/90 hip lift with pascale-bridge 3x10 -     Treatment/Session Assessment:    · Response to Treatment: Good performance with the exercises done. Had a few episodes of anterior R knee pain with the leg press and squat exercises today. Altered range and work load for this. Improved LQ control noted with the dynamic stability weight carry drill today. Good knee ROM. Mild stiff on extension, but appears to be able to get to 0 deg extension now. · Compliance with Program/Exercises: Appears compliant. · Recommendations/Intent for next treatment session: We will continue with knee motion treatment, progressive isolated and functional strengthening and control drills.     Total Treatment Duration: 45 minutes  PT Patient Time In/Time Out  Time In: 1355  Time Out: [address], PT, MSPT, OCS

## 2017-08-11 ENCOUNTER — HOSPITAL ENCOUNTER (OUTPATIENT)
Dept: PHYSICAL THERAPY | Age: 67
Discharge: HOME | End: 2017-08-11
Payer: COMMERCIAL

## 2017-08-11 PROCEDURE — 97110 THERAPEUTIC EXERCISES: CPT

## 2017-08-11 NOTE — PROGRESS NOTES
Rosario Eckert  : 1950 [facility] VAIBHAV GOMEZ  [address] [facility], [address] [place] [address] [address], [address] [facility]  [phone]   [phone]       OUTPATIENT PHYSICAL THERAPY:Daily Note 2017      ICD-10: Treatment Diagnosis: Pain in left knee (M25.562); Difficulty in walking, not elsewhere classified (R26.2); Presence of left artificial knee joint (K93.183)  Precautions/Allergies: post-op TKA precautions. Neosporin [hydrocortisone] and Pcn [penicillins]   Fall Risk Score: 2 (? 5 = High Risk)  MD Orders: Eval and Treat TKA rehab (17) MEDICAL/REFERRING DIAGNOSIS: F38.861 History of knee replacement procedure L knee  History of knee replacement procedure of left knee [Z96.652]   DATE OF ONSET: 17  REFERRING PHYSICIAN: Renée Villalta MD  RETURN PHYSICIAN APPOINTMENT: 17     PROGRESS ASSESSMENT (17):  Mr. Gearlean Schilder has attended 21 visits to date. He is now nearly 3 months s/p L TKA. He continues to make very good progress. He has minimal pain reports to the L knee. He has good ROM at 0 to 125 deg flexion after motion treatment. He has good strength to the hip and knee, but is still weaker than the R. Functionally, he is doing well with basic mobility and walking skill on level ground. He still has mild limp on L noted on stairs and at times level ground, but this appears habitual vs antalgic. He demonstrates neuromuscular control deficits with static and dynamic balance, but these are improving. He is progressing toward his discharge goals. He will benefit from continued PT to further progress strength and balance before we discharge him to his . 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 He will needed guided re-strengthening for protection of the new joint. PROBLEM LIST (Impacting functional limitations):  1. Post-op pain and swelling L knee  2. Decreased ROM L knee   3.  Decreased functional strength L knee/LE   4. Decreased gait skills INTERVENTIONS PLANNED:  1. Thermal and electric modalities, manual therapies for pain. 2. Manual therapies and therapeutic exercises for ROM and strength. 3. Therapeutic exercises for gait and balance   TREATMENT PLAN:  Effective Dates: 6/7/2017 TO 9/6/2017. Frequency/Duration: 3 times a week for 4 weeks, then assess need for additional 4-8 weeks to progress strengthening toward discharge goals. GOALS: (Goals have been discussed and agreed upon with patient.)   Short-Term Functional Goals: Time Frame: 4-6 weeks*  1. Report no more than minimal, intermittent 3/10 pain L knee with basic walking and ADL's, and score greater than 40/80 on the LEFS. MET 7/6/17  2. L knee PROM is greater than or equal to 0-115 degrees flexion. MET 7-6-17  3. L quad strength is 5/5 with good terminal extension strength for stability with walking and progressing into strengthening phase. MET 7-6-17  4. Walking without limp on level surfaces and up stairs with reciprocal pattern. Met 8-7-17  Discharge Goals: Time Frame: 10-12 weeks   1. No more than 3/10 pain L knee with all normalized daily home, work, and  conditioning training activities, and score greater than 55/80 on the LEFS. Progressing and ngoing 8-7-17  2. Demonstrate good L knee and LE functional quad strength with good control walking down stairs, slopes Progressing and ngoing 8-7-17  3. Able to balance with good control in single-leg stand greater than 15 seconds with eyes open, greater than 10 seconds with eyes closed for improved dynamic stability. Progressing and ngoing 8-7-17  4. Independent with [facility] for advanced knee strengthening. Progressing and ngoing 8-7-17  Rehabilitation Potential For Stated Goals: Good              The information in this section was collected on 6-7-17 (except where otherwise noted). HISTORY:   History of Present Injury/Illness (Reason for Referral):  Long history of L knee pain beginning with a sports injury when in high school, then again in the [facility]. Worsening of knee pain and swelling limiting his activities over recent years. Was treated with injections and synovial fluid replacement with limited effects. DJD diagnosed and TKA recommended. This was done 5-17-17. He was hospitalized 2 days with [facility] rehab, then discharged home with home health PT for 2 weeks. Past Medical History/Comorbidities: s/p L TKA 5-17-17. PMH from EMR includes:  Mr. Sam Encarnacion  has a past medical history of Benign prostatic hyperplasia; CAD (coronary artery disease); Depression; Diastolic dysfunction; Elevated serum creatinine (04/24/2017); Former smoker; H/O echocardiogram (07/24/2014); Hypercholesteremia; Hypertension; Osteoarthritis; and Overactive bladder. He also has no past medical history of Adverse effect of anesthesia; Difficult intubation; Malignant hyperthermia due to anesthesia; Nausea & vomiting; or Pseudocholinesterase deficiency. Mr. Sam Encarnacion  has a past surgical history that includes urological (2013) and colonoscopy. Social History/Living Environment: Lives with his wife. Home has a flight of stairs to his home office. Prior Level of Function/Work/Activity: Works in [employer] with frequent driving trips. Was active with walking 30 minutes daily and strength training with a  the past 4-5 years. 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 Current Medications:  Tylenol prn. Protonix and 2 anti-biotics for 2 weeks to finish 6/30/17. Other meds from EMR include:     Current Outpatient Prescriptions:     acetaminophen (TYLENOL) 500 mg tablet, Take 2 Tabs by mouth every six (6) hours. (Patient taking differently: Take 2 Tabs by mouth every six (6) hours.  Indications: Fever, Pain), Disp: 120 Tab, Rfl: 0    aspirin 81 mg chewable tablet, Take 1 Tab by mouth two (2) times a day., Disp: 60 Tab, Rfl: 0    cyclobenzaprine (FLEXERIL) 10 mg tablet, Take 0.5 Tabs by mouth three (3) times daily. , Disp: 60 Tab, Rfl: 0    senna-docusate (PERICOLACE) 8.6-50 mg per tablet, Take 2 Tabs by mouth daily. , Disp: 120 Tab, Rfl: 0    zolpidem (AMBIEN) 5 mg tablet, Take 1 Tab by mouth nightly as needed for Sleep. Max Daily Amount: 5 mg., Disp: 30 Tab, Rfl: 0    metoprolol tartrate (LOPRESSOR) 25 mg tablet, Take 25 mg by mouth two (2) times a day. Take DOS per anesthesia protocol., Disp: , Rfl:     sulindac (CLINORIL) 150 mg tablet, Take 150 mg by mouth two (2) times a day. Non-formulary. Patient instructed to bring to the hospital on the DOS, in the original bottle, and give to nurse., Disp: , Rfl:     olopatadine (PATANASE) 0.6 % spry, 2 Squirts by Both Nostrils route two (2) times a day. Non-formulary. Patient instructed to bring to the hospital on the DOS, in the original bottle, and give to nurse., Disp: , Rfl:     mirabegron ER (MYRBETRIQ) 50 mg ER tablet, Take 50 mg by mouth nightly. Non-formulary. Patient instructed to bring to the hospital on the DOS, in the original bottle, and give to nurse., Disp: , Rfl:     atorvastatin (LIPITOR) 40 mg tablet, Take 40 mg by mouth nightly., Disp: , Rfl:     lisinopril (PRINIVIL, ZESTRIL) 10 mg tablet, Take 10 mg by mouth daily. , Disp: , Rfl:     isosorbide mononitrate ER (IMDUR) 30 mg tablet, Take 30 mg by mouth daily. Take DOS per anesthesia protocol., Disp: , Rfl:     ezetimibe (ZETIA) 10 mg tablet, Take 10 mg by mouth daily. , Disp: , Rfl:     fenofibrate (LOFIBRA) 160 mg tablet, Take 160 mg by mouth daily. , Disp: , Rfl:     FLUoxetine (PROZAC) 20 mg capsule, Take 20 mg by mouth daily. Take DOS per anesthesia protocol., Disp: , Rfl:    Date Last Reviewed:  8-7-17      Number of Personal Factors/Comorbidities that affect the Plan of Care: 1-2: MODERATE COMPLEXITY   EXAMINATION:   8/11/2017  Observation/Orthostatic Postural Assessment: Walks into clinic without distress. Mild swelling to L knee.  Mild atrophy noted to quads and calf on L.      ROM: Not measured. Strength:    L Hip and Knee grossly 5/5 with manual testing. Functional Mobility: Walking on level ground: Good knee mechanics on L, no significant limp on L. Stair walking: up/down with reciprocal pattern with mild limp on L stance phase. Balance:Static single leg balance, firm/eyes open: L=10\", R>15\"         Body Structures Involved:  1. Joints  2. Muscles Body Functions Affected:  1. Neuromusculoskeletal  2. Movement Related Activities and Participation Affected:  1. General Tasks and Demands  2. Mobility   Number of elements (examined above) that affect the Plan of Care: 4+: HIGH COMPLEXITY   CLINICAL PRESENTATION:   Presentation: Stable and uncomplicated: LOW COMPLEXITY   CLINICAL DECISION MAKING:   Outcome Measure: Tool Used: Lower Extremity Functional Scale (LEFS)  Score:  Initial: 26/80 Most Recent: 57/80 (Date: 7-6-17 )   Interpretation of Score: 20 questions each scored on a 5 point scale with 0 representing \"extreme difficulty or unable to perform\" and 4 representing \"no difficulty\". The lower the score, the greater the functional disability. 80/80 represents no disability. Minimal detectable change is 9 points. Medical Necessity:   · Patient is expected to demonstrate progress in strength, range of motion and balance to return to independent basic mobility and progress to return to his recreational activity. .  Reason for Services/Other Comments:  · Patient continues to require skilled intervention due to the complexity of the surgical procedure, and need for safe, progressive rehab to return to his normal activity at home, work, and recreation actiivitie.    Use of outcome tool(s) and clinical judgement create a POC that gives a: Clear prediction of patient's progress: LOW COMPLEXITY          TREATMENT:   (In addition to Assessment/Re-Assessment sessions the following treatments were rendered)  8/11/2017  Pre-treatment Symptoms/Complaints: Says his knee is feeling good this week. Didn't do as much exercise on it. Walked for exercise but only on level ground. Went to the gym 2 days and did core and upper body exercises. No significant soreness to report today. Pain: Initial:  No pain reported. Post Session: 0/10      Manual Therapies: (5 Minutes): Brief fascial and soft tissue mobilization to skin around L knee, anterior and lateral quad for soft tissue restriction. Physiologic mobilization to L knee extension and flexion in supine for stiffness, grade 4- to 4, 3x30\" each. Therapeutic Exercise: (40 Minutes): Knee motion with assisted Active Isolated Stretching to L gastroc., posterior hip, hip adductors, lateral hip, hamstrings in 90/90 and SLR, and quads/hip flexors in side-lying and Gaston positions, 3\"x10 each. Performed exercises for knee balance/control as per grid below. Exercises modified as indicated. Verbal and visual cues for exercise technique and alignment, especially for eccentric landing control to knee/LE with the reactive control drill. HEP: He is to continue with his existing HEP. He verbalizes understanding.    Date  7-26-17 Date  7-31-17 Date  8-4-17 Date  8-7-17 Date  8-11-17   Activity/Exercise        Knee ROM/Flexibilty - As above As above As above Assisted as above   Seated hamstring curls Machine unilat  25# 2x15 ea Machine unilat  30# 3x10 ea - unilat  30# 2x10-15 ea -   Long arc quad  Machine unilat  10# 2x15 ea Machine unilat  15# 3x10 ea - unilat  15# 3x15 ea -   Slantboard calf stretch - - - - -   Step ups - - - - -   Side-lying hip ABD - - - - -   Standing HS curls - - - - -   Shuttle Press - unilat  50# 3x10 ea - unilat  75# 3x10 ea -   Squats bench squat  10# 1x15,  L bias x10  R bias x10 Bench squat  12# 1x15 even feet,   L bias 12# 1x15  R bias12# 1x15 - Bench squat  12# 1x15 even feet -   Balance--Static Single-leg   10# weight carry ipsilateral, contralateral 10\"x3 each L and R - Single-leg static 3x10-15\" ea; With 10# weight carry ipsilateral, contralateral 10\"x3 each L and R - AeroMats in normal stance x30\", stride L and R x30\" ea, tandem L and R 3x15\" each   Balance--Dynamic Wobble board 4-ways x30 each;  12-in larry step over's 5x5 ea;  Step-to foam 3x5 ea, walk-to cecily 3x5 each, and step-to foam with 10# weight carry ipsilateral/contralateral 3x5 ea - Single stepping with 10# weight carry ipsilateral, contralateral 2x10 each L and R step-to foam with 12# weight carry ipsilateral/contralateral 3x10 ea Reactive control forward lunge step, firm   3x5 ea   Core Strength:   Upper ab curl up/sit up x20;  Prone ext L3  10\"x5;  Prone Plank 10\"x5; lower ab L. 1 x10;  Bridge 10\"x5 myokin 90/90 hip lift with pascale-bridge 3x10 - -     Treatment/Session Assessment:    · Response to Treatment: Time spent on flexibility and on neuromuscular control for stabilization and eccentric loading to L knee and LE. Improving functional strength and control. Advised to take an active recovery week every 3rd week of training. · Compliance with Program/Exercises: Appears compliant. · Recommendations/Intent for next treatment session: We will continue with knee motion treatment, progressive isolated and functional strengthening and control drills.     Total Treatment Duration: 45 minutes  PT Patient Time In/Time Out  Time In: 1300  Time Out: [facility], PT, MSPT, OCS

## 2017-08-14 ENCOUNTER — HOSPITAL ENCOUNTER (OUTPATIENT)
Dept: PHYSICAL THERAPY | Age: 67
Discharge: HOME | End: 2017-08-14
Payer: COMMERCIAL

## 2017-08-14 PROCEDURE — 97110 THERAPEUTIC EXERCISES: CPT

## 2017-08-14 NOTE — PROGRESS NOTES
Marciano Richardson  : 1950 [facility] JASON  [address] [place] [address], [place] [address].  [phone]   [phone]       OUTPATIENT PHYSICAL THERAPY:Daily Note 2017      ICD-10: Treatment Diagnosis: Pain in left knee (M25.562); Difficulty in walking, not elsewhere classified (R26.2); Presence of left artificial knee joint (X02.260)  Precautions/Allergies: post-op TKA precautions. Neosporin [hydrocortisone] and Pcn [penicillins]   Fall Risk Score: 2 (? 5 = High Risk)  MD Orders: Eval and Treat TKA rehab (17) MEDICAL/REFERRING DIAGNOSIS: Z64.065 History of knee replacement procedure L knee  History of knee replacement procedure of left knee [Z96.652]   DATE OF ONSET: 17  REFERRING PHYSICIAN: Tamra Ramirez MD  RETURN PHYSICIAN APPOINTMENT: 17     PROGRESS ASSESSMENT (17):  Mr. Wayne Mobley has attended 21 visits to date. He is now nearly 3 months s/p L TKA. He continues to make very good progress. He has minimal pain reports to the L knee. He has good ROM at 0 to 125 deg flexion after motion treatment. He has good strength to the hip and knee, but is still weaker than the R. Functionally, he is doing well with basic mobility and walking skill on level ground. He still has mild limp on L noted on stairs and at times level ground, but this appears habitual vs antalgic. He demonstrates neuromuscular control deficits with static and dynamic balance, but these are improving. He is progressing toward his discharge goals. He will benefit from continued PT to further progress strength and balance before we discharge him to his . 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 He will needed guided re-strengthening for protection of the new joint. PROBLEM LIST (Impacting functional limitations):  1. Post-op pain and swelling L knee  2. Decreased ROM L knee   3.  Decreased functional strength L knee/LE   4. Decreased gait skills INTERVENTIONS PLANNED:  1. Thermal and electric modalities, manual therapies for pain. 2. Manual therapies and therapeutic exercises for ROM and strength. 3. Therapeutic exercises for gait and balance   TREATMENT PLAN:  Effective Dates: 6/7/2017 TO 9/6/2017. Frequency/Duration: 3 times a week for 4 weeks, then assess need for additional 4-8 weeks to progress strengthening toward discharge goals. GOALS: (Goals have been discussed and agreed upon with patient.)   Short-Term Functional Goals: Time Frame: 4-6 weeks*  1. Report no more than minimal, intermittent 3/10 pain L knee with basic walking and ADL's, and score greater than 40/80 on the LEFS. MET 7/6/17  2. L knee PROM is greater than or equal to 0-115 degrees flexion. MET 7-6-17  3. L quad strength is 5/5 with good terminal extension strength for stability with walking and progressing into strengthening phase. MET 7-6-17  4. Walking without limp on level surfaces and up stairs with reciprocal pattern. Met 8-7-17  Discharge Goals: Time Frame: 10-12 weeks   1. No more than 3/10 pain L knee with all normalized daily home, work, and  conditioning training activities, and score greater than 55/80 on the LEFS. Progressing and ngoing 8-7-17  2. Demonstrate good L knee and LE functional quad strength with good control walking down stairs, slopes Progressing and ngoing 8-7-17  3. Able to balance with good control in single-leg stand greater than 15 seconds with eyes open, greater than 10 seconds with eyes closed for improved dynamic stability. Progressing and ngoing 8-7-17  4. Independent with [facility] for advanced knee strengthening. Progressing and ngoing 8-7-17  Rehabilitation Potential For Stated Goals: Good              The information in this section was collected on 6-7-17 (except where otherwise noted). HISTORY:   History of Present Injury/Illness (Reason for Referral):  Long history of L knee pain beginning with a sports injury when in high school, then again in the [facility]. Worsening of knee pain and swelling limiting his activities over recent years. Was treated with injections and synovial fluid replacement with limited effects. DJD diagnosed and TKA recommended. This was done 5-17-17. He was hospitalized 2 days with [facility] rehab, then discharged home with home health PT for 2 weeks. Past Medical History/Comorbidities: s/p L TKA 5-17-17. PMH from EMR includes:  Mr. Andrade Manjarrez  has a past medical history of Benign prostatic hyperplasia; CAD (coronary artery disease); Depression; Diastolic dysfunction; Elevated serum creatinine (04/24/2017); Former smoker; H/O echocardiogram (07/24/2014); Hypercholesteremia; Hypertension; Osteoarthritis; and Overactive bladder. He also has no past medical history of Adverse effect of anesthesia; Difficult intubation; Malignant hyperthermia due to anesthesia; Nausea & vomiting; or Pseudocholinesterase deficiency. Mr. Andrade Manjarrez  has a past surgical history that includes urological (2013) and colonoscopy. Social History/Living Environment: Lives with his wife. Home has a flight of stairs to his home office. Prior Level of Function/Work/Activity: Works in [employer] with frequent driving trips. Was active with walking 30 minutes daily and strength training with a  the past 4-5 years. Active with hunting, and works as a guide in [employer]. 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 Current Medications:  Tylenol prn. Protonix and 2 anti-biotics for 2 weeks to finish 6/30/17. Other meds from EMR include:     Current Outpatient Prescriptions:     acetaminophen (TYLENOL) 500 mg tablet, Take 2 Tabs by mouth every six (6) hours. (Patient taking differently: Take 2 Tabs by mouth every six (6) hours.  Indications: Fever, Pain), Disp: 120 Tab, Rfl: 0    aspirin 81 mg chewable tablet, Take 1 Tab by mouth two (2) times a day., Disp: 60 Tab, Rfl: 0    cyclobenzaprine (FLEXERIL) 10 mg tablet, Take 0.5 Tabs by mouth three (3) times daily. , Disp: 60 Tab, Rfl: 0    senna-docusate (PERICOLACE) 8.6-50 mg per tablet, Take 2 Tabs by mouth daily. , Disp: 120 Tab, Rfl: 0    zolpidem (AMBIEN) 5 mg tablet, Take 1 Tab by mouth nightly as needed for Sleep. Max Daily Amount: 5 mg., Disp: 30 Tab, Rfl: 0    metoprolol tartrate (LOPRESSOR) 25 mg tablet, Take 25 mg by mouth two (2) times a day. Take DOS per anesthesia protocol., Disp: , Rfl:     sulindac (CLINORIL) 150 mg tablet, Take 150 mg by mouth two (2) times a day. Non-formulary. Patient instructed to bring to the hospital on the DOS, in the original bottle, and give to nurse., Disp: , Rfl:     olopatadine (PATANASE) 0.6 % spry, 2 Squirts by Both Nostrils route two (2) times a day. Non-formulary. Patient instructed to bring to the hospital on the DOS, in the original bottle, and give to nurse., Disp: , Rfl:     mirabegron ER (MYRBETRIQ) 50 mg ER tablet, Take 50 mg by mouth nightly. Non-formulary. Patient instructed to bring to the hospital on the DOS, in the original bottle, and give to nurse., Disp: , Rfl:     atorvastatin (LIPITOR) 40 mg tablet, Take 40 mg by mouth nightly., Disp: , Rfl:     lisinopril (PRINIVIL, ZESTRIL) 10 mg tablet, Take 10 mg by mouth daily. , Disp: , Rfl:     isosorbide mononitrate ER (IMDUR) 30 mg tablet, Take 30 mg by mouth daily. Take DOS per anesthesia protocol., Disp: , Rfl:     ezetimibe (ZETIA) 10 mg tablet, Take 10 mg by mouth daily. , Disp: , Rfl:     fenofibrate (LOFIBRA) 160 mg tablet, Take 160 mg by mouth daily. , Disp: , Rfl:     FLUoxetine (PROZAC) 20 mg capsule, Take 20 mg by mouth daily. Take DOS per anesthesia protocol., Disp: , Rfl:    Date Last Reviewed:  8-14-17      Number of Personal Factors/Comorbidities that affect the Plan of Care: 1-2: MODERATE COMPLEXITY   EXAMINATION:   8/14/2017  Observation/Orthostatic Postural Assessment: Walks into clinic without distress. Mild swelling to L knee/lower leg. ROM: Not measured. Strength: Not measured. Functional Mobility: Walking on level ground: Good knee mechanics on L, no significant limp on L. Stair walking: up/down with reciprocal pattern with mild limp on L stance phase. Balance:Static single leg balance, firm/eyes open: L=10\", R>15\"         Body Structures Involved:  1. Joints  2. Muscles Body Functions Affected:  1. Neuromusculoskeletal  2. Movement Related Activities and Participation Affected:  1. General Tasks and Demands  2. Mobility   Number of elements (examined above) that affect the Plan of Care: 4+: HIGH COMPLEXITY   CLINICAL PRESENTATION:   Presentation: Stable and uncomplicated: LOW COMPLEXITY   CLINICAL DECISION MAKING:   Outcome Measure: Tool Used: Lower Extremity Functional Scale (LEFS)  Score:  Initial: 26/80 Most Recent: 57/80 (Date: 7-6-17 )   Interpretation of Score: 20 questions each scored on a 5 point scale with 0 representing \"extreme difficulty or unable to perform\" and 4 representing \"no difficulty\". The lower the score, the greater the functional disability. 80/80 represents no disability. Minimal detectable change is 9 points. Medical Necessity:   · Patient is expected to demonstrate progress in strength, range of motion and balance to return to independent basic mobility and progress to return to his recreational activity. .  Reason for Services/Other Comments:  · Patient continues to require skilled intervention due to the complexity of the surgical procedure, and need for safe, progressive rehab to return to his normal activity at home, work, and recreation actiivitie. Use of outcome tool(s) and clinical judgement create a POC that gives a: Clear prediction of patient's progress: LOW COMPLEXITY          TREATMENT:   (In addition to Assessment/Re-Assessment sessions the following treatments were rendered)  8/14/2017  Pre-treatment Symptoms/Complaints: Says his knee is \"feeling good\".  Just did some level ground walking over the weekend. Posterolateral knee discomfort is better. Pain: Initial:  No pain reported. Post Session: 0/10      Active warm up on bike. Therapeutic Exercise: (50 Minutes): Active stretching to gastroc on slant board and soleus in standing, 3x30\" each. Performed exercises for knee balance/control and functional strength as per grid below. Exercises modified as indicated. Verbal and visual cues for exercise technique and alignment. HEP: He is to continue with his existing HEP. He verbalizes understanding. Date  7-26-17 Date  7-31-17 Date  8-4-17 Date  8-7-17 Date  8-11-17 Date  8-14-17   Activity/Exercise         Knee ROM/Flexibilty - As above As above As above Assisted as above Active as above   Seated hamstring curls Machine unilat  25# 2x15 ea Machine unilat  30# 3x10 ea - unilat  30# 2x10-15 ea - -   Long arc quad  Machine unilat  10# 2x15 ea Machine unilat  15# 3x10 ea - unilat  15# 3x15 ea - -   Slantboard calf stretch - - - - - 30\"x3 ea   Step ups - - - - - Step up+overhead reach 4x5 each   Side-lying hip ABD - - - - - -   Standing HS curls - - - - - -   Shuttle Press - unilat  50# 3x10 ea - unilat  75# 3x10 ea - -   Squats bench squat  10# 1x15,  L bias x10  R bias x10 Bench squat  12# 1x15 even feet,   L bias 12# 1x15  R bias12# 1x15 - Bench squat  12# 1x15 even feet - Dead lift  30# bar 2x15   Balance--Static Single-leg   10# weight carry ipsilateral, contralateral 10\"x3 each L and R - Single-leg static 3x10-15\" ea; With 10# weight carry ipsilateral, contralateral 10\"x3 each L and R - AeroMats in normal stance x30\", stride L and R x30\" ea, tandem L and R 3x15\" each Single-leg static 3x10-15\" ea;   With 12# weight carry ipsilateral, contralateral 10\"x3 each L and R   Balance--Dynamic Wobble board 4-ways x30 each;  12-in larry step over's 5x5 ea;  Step-to foam 3x5 ea, walk-to cecily 3x5 each, and step-to foam with 10# weight carry ipsilateral/contralateral 3x5 ea - Single stepping with 10# weight carry ipsilateral, contralateral 2x10 each L and R step-to foam with 12# weight carry ipsilateral/contralateral 3x10 ea Reactive control forward lunge step, firm   3x5 ea 12-in larry step-over's with 12# weight carry ipsilateral and contralateral 3x5 rep ea   Core Strength:   Upper ab curl up/sit up x20;  Prone ext L3  10\"x5;  Prone Plank 10\"x5; lower ab L. 1 x10;  Bridge 10\"x5 myokin 90/90 hip lift with pascale-bridge 3x10 - - -   Pushing      7# cable press in split stance  2x15 L and R   Pulling      13# cable pull in split stance 2x15 L and R   LIft and Carry      20# dumbbell, unilat carry over 30-ft ipsilat x2 L and R, contralat x2 L and R     Treatment/Session Assessment:    · Response to Treatment: Very good performance of the exercises done. No pain reported to L knee/LE with these. Difficulty with balance control on single-leg drills, R worse than L, to start with, but then improved. Good performance of the new functional strength moves done. Cues for foot position, stance width, and LE alignment through the motion. · Compliance with Program/Exercises: Appears compliant. · Recommendations/Intent for next treatment session: We will continue with knee motion treatment, progressive isolated and functional strengthening and control drills.     Total Treatment Duration: 50 minutes  PT Patient Time In/Time Out  Time In: 1350  Time Out: [address], PT, [facility], OCS

## 2017-08-18 ENCOUNTER — HOSPITAL ENCOUNTER (OUTPATIENT)
Dept: PHYSICAL THERAPY | Age: 67
Discharge: HOME | End: 2017-08-18
Payer: COMMERCIAL

## 2017-08-18 PROCEDURE — 97110 THERAPEUTIC EXERCISES: CPT

## 2017-08-18 NOTE — PROGRESS NOTES
Blaire Hernandez  : 1950 [facility] VAIBHAV GOMEZ  [address] [facility], [address] [place] [address],[address], [place] [address].  [phone]   [phone]       OUTPATIENT PHYSICAL THERAPY:Daily Note 2017      ICD-10: Treatment Diagnosis: Pain in left knee (M25.562); Difficulty in walking, not elsewhere classified (R26.2); Presence of left artificial knee joint (J57.058)  Precautions/Allergies: post-op TKA precautions. Neosporin [hydrocortisone] and Pcn [penicillins]   Fall Risk Score: 2 (? 5 = High Risk)  MD Orders: Eval and Treat TKA rehab (17) MEDICAL/REFERRING DIAGNOSIS: W31.419 History of knee replacement procedure L knee  History of knee replacement procedure of left knee [Z96.652]   DATE OF ONSET: 17  REFERRING PHYSICIAN: Hobart Castleman, MD  RETURN PHYSICIAN APPOINTMENT: 17     PROGRESS ASSESSMENT (17):  Mr. Odalis Strauss has attended 21 visits to date. He is now nearly 3 months s/p L TKA. He continues to make very good progress. He has minimal pain reports to the L knee. He has good ROM at 0 to 125 deg flexion after motion treatment. He has good strength to the hip and knee, but is still weaker than the R. Functionally, he is doing well with basic mobility and walking skill on level ground. He still has mild limp on L noted on stairs and at times level ground, but this appears habitual vs antalgic. He demonstrates neuromuscular control deficits with static and dynamic balance, but these are improving. He is progressing toward his discharge goals. He will benefit from continued PT to further progress strength and balance before we discharge him to his . 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 He will needed guided re-strengthening for protection of the new joint. PROBLEM LIST (Impacting functional limitations):  1. Post-op pain and swelling L knee  2. Decreased ROM L knee   3.  Decreased functional strength L knee/LE   4. Decreased gait skills INTERVENTIONS PLANNED:  1. Thermal and electric modalities, manual therapies for pain. 2. Manual therapies and therapeutic exercises for ROM and strength. 3. Therapeutic exercises for gait and balance   TREATMENT PLAN:  Effective Dates: 6/7/2017 TO 9/6/2017. Frequency/Duration: 3 times a week for 4 weeks, then assess need for additional 4-8 weeks to progress strengthening toward discharge goals. GOALS: (Goals have been discussed and agreed upon with patient.)   Short-Term Functional Goals: Time Frame: 4-6 weeks*  1. Report no more than minimal, intermittent 3/10 pain L knee with basic walking and ADL's, and score greater than 40/80 on the LEFS. MET 7/6/17  2. L knee PROM is greater than or equal to 0-115 degrees flexion. MET 7-6-17  3. L quad strength is 5/5 with good terminal extension strength for stability with walking and progressing into strengthening phase. MET 7-6-17  4. Walking without limp on level surfaces and up stairs with reciprocal pattern. Met 8-7-17  Discharge Goals: Time Frame: 10-12 weeks   1. No more than 3/10 pain L knee with all normalized daily home, work, and  conditioning training activities, and score greater than 55/80 on the LEFS. Progressing and ngoing 8-7-17  2. Demonstrate good L knee and LE functional quad strength with good control walking down stairs, slopes Progressing and ngoing 8-7-17  3. Able to balance with good control in single-leg stand greater than 15 seconds with eyes open, greater than 10 seconds with eyes closed for improved dynamic stability. Progressing and ngoing 8-7-17  4. Independent with [facility] for advanced knee strengthening. Progressing and ngoing 8-7-17  Rehabilitation Potential For Stated Goals: Good              The information in this section was collected on 6-7-17 (except where otherwise noted). HISTORY:   History of Present Injury/Illness (Reason for Referral):  Long history of L knee pain beginning with a sports injury when in high school, then again in the [facility]. Worsening of knee pain and swelling limiting his activities over recent years. Was treated with injections and synovial fluid replacement with limited effects. DJD diagnosed and TKA recommended. This was done 5-17-17. He was hospitalized 2 days with [address] rehab, then discharged home with home health PT for 2 weeks. Past Medical History/Comorbidities: s/p L TKA 5-17-17. PMH from EMR includes:  Mr. Luis Antonio Lock  has a past medical history of Benign prostatic hyperplasia; CAD (coronary artery disease); Depression; Diastolic dysfunction; Elevated serum creatinine (04/24/2017); Former smoker; H/O echocardiogram (07/24/2014); Hypercholesteremia; Hypertension; Osteoarthritis; and Overactive bladder. He also has no past medical history of Adverse effect of anesthesia; Difficult intubation; Malignant hyperthermia due to anesthesia; Nausea & vomiting; or Pseudocholinesterase deficiency. Mr. Luis Antonio Lock  has a past surgical history that includes urological (2013) and colonoscopy. Social History/Living Environment: Lives with his wife. Home has a flight of stairs to his home office. Prior Level of Function/Work/Activity: Works in sales with frequent driving trips. Was active with walking 30 minutes daily and strength training with a  the past 4-5 years. 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 Current Medications:  Tylenol prn. Protonix and 2 anti-biotics for 2 weeks to finish 6/30/17. Other meds from EMR include:     Current Outpatient Prescriptions:     acetaminophen (TYLENOL) 500 mg tablet, Take 2 Tabs by mouth every six (6) hours. (Patient taking differently: Take 2 Tabs by mouth every six (6) hours.  Indications: Fever, Pain), Disp: 120 Tab, Rfl: 0    aspirin 81 mg chewable tablet, Take 1 Tab by mouth two (2) times a day., Disp: 60 Tab, Rfl: 0    cyclobenzaprine (FLEXERIL) 10 mg tablet, Take 0.5 Tabs by mouth three (3) times daily. , Disp: 60 Tab, Rfl: 0    senna-docusate (PERICOLACE) 8.6-50 mg per tablet, Take 2 Tabs by mouth daily. , Disp: 120 Tab, Rfl: 0    zolpidem (AMBIEN) 5 mg tablet, Take 1 Tab by mouth nightly as needed for Sleep. Max Daily Amount: 5 mg., Disp: 30 Tab, Rfl: 0    metoprolol tartrate (LOPRESSOR) 25 mg tablet, Take 25 mg by mouth two (2) times a day. Take DOS per anesthesia protocol., Disp: , Rfl:     sulindac (CLINORIL) 150 mg tablet, Take 150 mg by mouth two (2) times a day. Non-formulary. Patient instructed to bring to the hospital on the DOS, in the original bottle, and give to nurse., Disp: , Rfl:     olopatadine (PATANASE) 0.6 % spry, 2 Squirts by Both Nostrils route two (2) times a day. Non-formulary. Patient instructed to bring to the hospital on the DOS, in the original bottle, and give to nurse., Disp: , Rfl:     mirabegron ER (MYRBETRIQ) 50 mg ER tablet, Take 50 mg by mouth nightly. Non-formulary. Patient instructed to bring to the hospital on the DOS, in the original bottle, and give to nurse., Disp: , Rfl:     atorvastatin (LIPITOR) 40 mg tablet, Take 40 mg by mouth nightly., Disp: , Rfl:     lisinopril (PRINIVIL, ZESTRIL) 10 mg tablet, Take 10 mg by mouth daily. , Disp: , Rfl:     isosorbide mononitrate ER (IMDUR) 30 mg tablet, Take 30 mg by mouth daily. Take DOS per anesthesia protocol., Disp: , Rfl:     ezetimibe (ZETIA) 10 mg tablet, Take 10 mg by mouth daily. , Disp: , Rfl:     fenofibrate (LOFIBRA) 160 mg tablet, Take 160 mg by mouth daily. , Disp: , Rfl:     FLUoxetine (PROZAC) 20 mg capsule, Take 20 mg by mouth daily. Take DOS per anesthesia protocol., Disp: , Rfl:    Date Last Reviewed:  8-14-17      Number of Personal Factors/Comorbidities that affect the Plan of Care: 1-2: MODERATE COMPLEXITY   EXAMINATION:   8/18/2017  Observation/Orthostatic Postural Assessment: Walks into clinic without distress. Mild swelling to L knee/lower leg. ROM: Not measured. Strength: Not measured. Functional Mobility: Walking on level ground: Good knee mechanics on L, no significant limp on L. Stair walking: up/down with reciprocal pattern with mild limp on L stance phase. Balance:Static single leg balance, firm/eyes open: L=10\", R>15\"         Body Structures Involved:  1. Joints  2. Muscles Body Functions Affected:  1. Neuromusculoskeletal  2. Movement Related Activities and Participation Affected:  1. General Tasks and Demands  2. Mobility   Number of elements (examined above) that affect the Plan of Care: 4+: HIGH COMPLEXITY   CLINICAL PRESENTATION:   Presentation: Stable and uncomplicated: LOW COMPLEXITY   CLINICAL DECISION MAKING:   Outcome Measure: Tool Used: Lower Extremity Functional Scale (LEFS)  Score:  Initial: 26/80 Most Recent: 57/80 (Date: 7-6-17 )   Interpretation of Score: 20 questions each scored on a 5 point scale with 0 representing \"extreme difficulty or unable to perform\" and 4 representing \"no difficulty\". The lower the score, the greater the functional disability. 80/80 represents no disability. Minimal detectable change is 9 points. Medical Necessity:   · Patient is expected to demonstrate progress in strength, range of motion and balance to return to independent basic mobility and progress to return to his recreational activity. .  Reason for Services/Other Comments:  · Patient continues to require skilled intervention due to the complexity of the surgical procedure, and need for safe, progressive rehab to return to his normal activity at home, work, and recreation actiivitie.    Use of outcome tool(s) and clinical judgement create a POC that gives a: Clear prediction of patient's progress: LOW COMPLEXITY          TREATMENT:   (In addition to Assessment/Re-Assessment sessions the following treatments were rendered)  8/18/2017  Pre-treatment Symptoms/Complaints: Says his knee was sore after last time and into the next day. But this eased off. Did a lot of walking getting his 10,000 steps per day, and went to the gym Tuesday and Thursday. Knee feel good this morning. No pain or soreness to report. Pain: Initial: Pain Intensity 1: 0 (/10 L knee)No pain reported. Post Session: 0/10      Active warm up on bike. Therapeutic Exercise: (50 Minutes): Active stretching to gastroc on slant board and soleus in standing, hamstrings in long sitting, and quads/hip flexors in Laurey Florien positions, 3x30\" each. Performed exercises for knee balance/control and functional strength as per grid below. Exercises modified as indicated. Verbal and visual cues for exercise technique and alignment. HEP: He is rest his legs tomorrow. He plans to do a trail walk on Sunday. He verbalizes understanding. Date  7-26-17 Date  7-31-17 Date  8-4-17 Date  8-7-17 Date  8-11-17 Date  8-14-17 Date  8-18-17   Activity/Exercise          Knee ROM/Flexibilty - As above As above As above Assisted as above Active as above Active as above   Seated hamstring curls Machine unilat  25# 2x15 ea Machine unilat  30# 3x10 ea - unilat  30# 2x10-15 ea - - -   Long arc quad  Machine unilat  10# 2x15 ea Machine unilat  15# 3x10 ea - unilat  15# 3x15 ea - - -   Slantboard calf stretch - - - - - 30\"x3 ea 30\"x3 ea   Step ups - - - - - Step up+overhead reach 4x5 each 12-in Step up+overhead reach 4x5 ea   Side-lying hip ABD - - - - - - -   Standing HS curls - - - - - - -   Shuttle Press - unilat  50# 3x10 ea - unilat  75# 3x10 ea - - -   Squats bench squat  10# 1x15,  L bias x10  R bias x10 Bench squat  12# 1x15 even feet,   L bias 12# 1x15  R bias12# 1x15 - Bench squat  12# 1x15 even feet - Dead lift  30# bar 2x15 Dead lift  30# bar 2x15   Balance--Static Single-leg   10# weight carry ipsilateral, contralateral 10\"x3 each L and R - Single-leg static 3x10-15\" ea;   With 10# weight carry ipsilateral, contralateral 10\"x3 each L and R - AeroMats in normal stance x30\", stride L and R x30\" ea, tandem L and R 3x15\" each Single-leg static 3x10-15\" ea; With 12# weight carry ipsilateral, contralateral 10\"x3 each L and R Single-leg static 3x10-15\" ea; With 12# weight carry ipsilateral, contralateral 10\"x3 each L and R   Balance--Dynamic Wobble board 4-ways x30 each;  12-in larry step over's 5x5 ea;  Step-to foam 3x5 ea, walk-to cecily 3x5 each, and step-to foam with 10# weight carry ipsilateral/contralateral 3x5 ea - Single stepping with 10# weight carry ipsilateral, contralateral 2x10 each L and R step-to foam with 12# weight carry ipsilateral/contralateral 3x10 ea Reactive control forward lunge step, firm   3x5 ea 12-in larry step-over's with 12# weight carry ipsilateral and contralateral 3x5 rep ea 12-in larry step-over's with 12# weight carry ipsilateral and contralateral 3x5 rep ea   Core Strength:   Upper ab curl up/sit up x20;  Prone ext L3  10\"x5;  Prone Plank 10\"x5; lower ab L. 1 x10;  Bridge 10\"x5 myokin 90/90 hip lift with pascale-bridge 3x10 - - - 90/90 hip lift with pascale-bridge 3x 5 breaths   Pushing      7# cable press in split stance  2x15 L and R 10# cable press in split stance  3x15 L and R   Pulling      13# cable pull in split stance 2x15 L and R 13# cable pull in split stance 2x15 L and R   LIft and Carry      20# dumbbell, unilat carry over 30-ft ipsilat x2 L and R, contralat x2 L and R 20# dumbbell, unilat carry over 30-ft ipsilat x2 L and R, contralat x2 L and R     Treatment/Session Assessment:    · Response to Treatment: Very good performance of the exercises done. No pain reported to L knee/LE with these. · Compliance with Program/Exercises: Appears compliant. · Recommendations/Intent for next treatment session: We will continue with knee motion treatment, progressive isolated and functional strengthening and control drills.     Total Treatment Duration: 50 minutes  PT Patient Time In/Time Out  Time In: 0855  Time Out: 1900 EAbelino Rosario, PT, MSPT, OCS

## 2017-08-21 ENCOUNTER — APPOINTMENT (OUTPATIENT)
Dept: PHYSICAL THERAPY | Age: 67
End: 2017-08-21
Payer: COMMERCIAL

## 2017-08-23 ENCOUNTER — HOSPITAL ENCOUNTER (OUTPATIENT)
Dept: PHYSICAL THERAPY | Age: 67
Discharge: HOME | End: 2017-08-23
Payer: COMMERCIAL

## 2017-08-23 PROCEDURE — 97110 THERAPEUTIC EXERCISES: CPT

## 2017-08-23 NOTE — PROGRESS NOTES
Mary Cohen  : 1950 [facility] VAIBHAV GOMEZ  [address] [facility], [address] [place] [address] [address], [facility]  [phone]   [phone]       OUTPATIENT PHYSICAL THERAPY:Daily Note 2017      ICD-10: Treatment Diagnosis: Pain in left knee (M25.562); Difficulty in walking, not elsewhere classified (R26.2); Presence of left artificial knee joint (Q84.547)  Precautions/Allergies: post-op TKA precautions. Neosporin [hydrocortisone] and Pcn [penicillins]   Fall Risk Score: 2 (? 5 = High Risk)  MD Orders: Eval and Treat TKA rehab (17) MEDICAL/REFERRING DIAGNOSIS: U48.212 History of knee replacement procedure L knee  History of knee replacement procedure of left knee [Z96.652]   DATE OF ONSET: 17  REFERRING PHYSICIAN: Millicent Cooks, MD  RETURN PHYSICIAN APPOINTMENT: 17     PROGRESS ASSESSMENT (17):  Mr. Mayank Eaton has attended 21 visits to date. He is now nearly 3 months s/p L TKA. He continues to make very good progress. He has minimal pain reports to the L knee. He has good ROM at 0 to 125 deg flexion after motion treatment. He has good strength to the hip and knee, but is still weaker than the R. Functionally, he is doing well with basic mobility and walking skill on level ground. He still has mild limp on L noted on stairs and at times level ground, but this appears habitual vs antalgic. He demonstrates neuromuscular control deficits with static and dynamic balance, but these are improving. He is progressing toward his discharge goals. He will benefit from continued PT to further progress strength and balance before we discharge him to his . 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 He will needed guided re-strengthening for protection of the new joint. PROBLEM LIST (Impacting functional limitations):  1. Post-op pain and swelling L knee  2. Decreased ROM L knee   3.  Decreased functional strength L knee/LE   4. Decreased gait skills INTERVENTIONS PLANNED:  1. Thermal and electric modalities, manual therapies for pain. 2. Manual therapies and therapeutic exercises for ROM and strength. 3. Therapeutic exercises for gait and balance   TREATMENT PLAN:  Effective Dates: 6/7/2017 TO 9/6/2017. Frequency/Duration: 3 times a week for 4 weeks, then assess need for additional 4-8 weeks to progress strengthening toward discharge goals. GOALS: (Goals have been discussed and agreed upon with patient.)   Short-Term Functional Goals: Time Frame: 4-6 weeks*  1. Report no more than minimal, intermittent 3/10 pain L knee with basic walking and ADL's, and score greater than 40/80 on the LEFS. MET 7/6/17  2. L knee PROM is greater than or equal to 0-115 degrees flexion. MET 7-6-17  3. L quad strength is 5/5 with good terminal extension strength for stability with walking and progressing into strengthening phase. MET 7-6-17  4. Walking without limp on level surfaces and up stairs with reciprocal pattern. Met 8-7-17  Discharge Goals: Time Frame: 10-12 weeks   1. No more than 3/10 pain L knee with all normalized daily home, work, and  conditioning training activities, and score greater than 55/80 on the LEFS. Progressing and ngoing 8-7-17  2. Demonstrate good L knee and LE functional quad strength with good control walking down stairs, slopes Progressing and ngoing 8-7-17  3. Able to balance with good control in single-leg stand greater than 15 seconds with eyes open, greater than 10 seconds with eyes closed for improved dynamic stability. Progressing and ngoing 8-7-17  4. Independent with [facility] for advanced knee strengthening. Progressing and ngoing 8-7-17  Rehabilitation Potential For Stated Goals: Good              The information in this section was collected on 6-7-17 (except where otherwise noted). HISTORY:   History of Present Injury/Illness (Reason for Referral):  Long history of L knee pain beginning with a sports injury when in high school, then again in the [facility]. Worsening of knee pain and swelling limiting his activities over recent years. Was treated with injections and synovial fluid replacement with limited effects. DJD diagnosed and TKA recommended. This was done 5-17-17. He was hospitalized 2 days with [facility] rehab, then discharged home with home health PT for 2 weeks. Past Medical History/Comorbidities: s/p L TKA 5-17-17. PMH from EMR includes:  Mr. Lan Garcia  has a past medical history of Benign prostatic hyperplasia; CAD (coronary artery disease); Depression; Diastolic dysfunction; Elevated serum creatinine (04/24/2017); Former smoker; H/O echocardiogram (07/24/2014); Hypercholesteremia; Hypertension; Osteoarthritis; and Overactive bladder. He also has no past medical history of Adverse effect of anesthesia; Difficult intubation; Malignant hyperthermia due to anesthesia; Nausea & vomiting; or Pseudocholinesterase deficiency. Mr. Lan Garcia  has a past surgical history that includes urological (2013) and colonoscopy. Social History/Living Environment: Lives with his wife. Home has a flight of stairs to his home office. Prior Level of Function/Work/Activity: Works in sales with frequent driving trips. Was active with walking 30 minutes daily and strength training with a  the past 4-5 years. 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 Current Medications:  Tylenol prn. Protonix and 2 anti-biotics for 2 weeks to finish 6/30/17. Other meds from EMR include:   Current Outpatient Prescriptions:     acetaminophen (TYLENOL) 500 mg tablet, Take 2 Tabs by mouth every six (6) hours. (Patient taking differently: Take 2 Tabs by mouth every six (6) hours.  Indications: Fever, Pain), Disp: 120 Tab, Rfl: 0    aspirin 81 mg chewable tablet, Take 1 Tab by mouth two (2) times a day., Disp: 60 Tab, Rfl: 0    cyclobenzaprine (FLEXERIL) 10 mg tablet, Take 0.5 Tabs by mouth three (3) times daily. , Disp: 60 Tab, Rfl: 0    senna-docusate (PERICOLACE) 8.6-50 mg per tablet, Take 2 Tabs by mouth daily. , Disp: 120 Tab, Rfl: 0    zolpidem (AMBIEN) 5 mg tablet, Take 1 Tab by mouth nightly as needed for Sleep. Max Daily Amount: 5 mg., Disp: 30 Tab, Rfl: 0    metoprolol tartrate (LOPRESSOR) 25 mg tablet, Take 25 mg by mouth two (2) times a day. Take DOS per anesthesia protocol., Disp: , Rfl:     sulindac (CLINORIL) 150 mg tablet, Take 150 mg by mouth two (2) times a day. Non-formulary. Patient instructed to bring to the hospital on the DOS, in the original bottle, and give to nurse., Disp: , Rfl:     olopatadine (PATANASE) 0.6 % spry, 2 Squirts by Both Nostrils route two (2) times a day. Non-formulary. Patient instructed to bring to the hospital on the DOS, in the original bottle, and give to nurse., Disp: , Rfl:     mirabegron ER (MYRBETRIQ) 50 mg ER tablet, Take 50 mg by mouth nightly. Non-formulary. Patient instructed to bring to the hospital on the DOS, in the original bottle, and give to nurse., Disp: , Rfl:     atorvastatin (LIPITOR) 40 mg tablet, Take 40 mg by mouth nightly., Disp: , Rfl:     lisinopril (PRINIVIL, ZESTRIL) 10 mg tablet, Take 10 mg by mouth daily. , Disp: , Rfl:     isosorbide mononitrate ER (IMDUR) 30 mg tablet, Take 30 mg by mouth daily. Take DOS per anesthesia protocol., Disp: , Rfl:     ezetimibe (ZETIA) 10 mg tablet, Take 10 mg by mouth daily. , Disp: , Rfl:     fenofibrate (LOFIBRA) 160 mg tablet, Take 160 mg by mouth daily. , Disp: , Rfl:     FLUoxetine (PROZAC) 20 mg capsule, Take 20 mg by mouth daily. Take DOS per anesthesia protocol., Disp: , Rfl:    Date Last Reviewed:  8-23-17      Number of Personal Factors/Comorbidities that affect the Plan of Care: 1-2: MODERATE COMPLEXITY   EXAMINATION:   8/23/2017  Observation/Orthostatic Postural Assessment: Walks into clinic without distress. Mild swelling to L knee/lower leg. ROM: Not measured. Strength: Not measured. Functional Mobility: Walking on level ground: Good knee mechanics on L, no significant limp on L. Stair walking: up/down with reciprocal pattern with mild limp on L stance phase. Balance:Static single leg balance, firm/eyes open: L=10\", R>15\"         Body Structures Involved:  1. Joints  2. Muscles Body Functions Affected:  1. Neuromusculoskeletal  2. Movement Related Activities and Participation Affected:  1. General Tasks and Demands  2. Mobility   Number of elements (examined above) that affect the Plan of Care: 4+: HIGH COMPLEXITY   CLINICAL PRESENTATION:   Presentation: Stable and uncomplicated: LOW COMPLEXITY   CLINICAL DECISION MAKING:   Outcome Measure: Tool Used: Lower Extremity Functional Scale (LEFS)  Score:  Initial: 26/80 Most Recent: 57/80 (Date: 7-6-17 )   Interpretation of Score: 20 questions each scored on a 5 point scale with 0 representing \"extreme difficulty or unable to perform\" and 4 representing \"no difficulty\". The lower the score, the greater the functional disability. 80/80 represents no disability. Minimal detectable change is 9 points. Medical Necessity:   · Patient is expected to demonstrate progress in strength, range of motion and balance to return to independent basic mobility and progress to return to his recreational activity. .  Reason for Services/Other Comments:  · Patient continues to require skilled intervention due to the complexity of the surgical procedure, and need for safe, progressive rehab to return to his normal activity at home, work, and recreation actiivitie. Use of outcome tool(s) and clinical judgement create a POC that gives a: Clear prediction of patient's progress: LOW COMPLEXITY          TREATMENT:   (In addition to Assessment/Re-Assessment sessions the following treatments were rendered)  8/23/2017  Pre-treatment Symptoms/Complaints: Says his knee is doing well.  No pain or soreness to report after last session. Continued with his walking 10,000 steps, but hasn't done hard hills. Did walk trail about an hour total without issue. Plans to do a hike on Saturday. Feeling good about his conditioning prep for his upcoming [place] trip the end of September. Pain: Initial:  No pain reported. Post Session: 0/10      Active warm up on bike. Therapeutic Exercise: (45 Minutes): Performed exercises for knee balance/control and functional strength as per grid below. Exercises modified as indicated. Verbal and visual cues for exercise technique and alignment. HEP: He is rest his legs tomorrow. He plans to do a trail walk on Sunday. He verbalizes understanding. Date  7-26-17 Date  7-31-17 Date  8-4-17 Date  8-7-17 Date  8-11-17 Date  8-14-17 Date  8-18-17 Date  8-23-17   Activity/Exercise           Knee ROM/Flexibilty - As above As above As above Assisted as above Active as above Active as above -   Seated hamstring curls Machine unilat  25# 2x15 ea Machine unilat  30# 3x10 ea - unilat  30# 2x10-15 ea - - - -   Long arc quad  Machine unilat  10# 2x15 ea Machine unilat  15# 3x10 ea - unilat  15# 3x15 ea - - - -   Slantboard calf stretch - - - - - 30\"x3 ea 30\"x3 ea -   Step ups - - - - - Step up+overhead reach 4x5 each 12-in Step up+overhead reach 4x5 ea 14-in step up+overhead reach 3x5 ea   Side-lying hip ABD - - - - - - - -   Standing HS curls - - - - - - - -   Shuttle Press - unilat  50# 3x10 ea - unilat  75# 3x10 ea - - - -   Squats bench squat  10# 1x15,  L bias x10  R bias x10 Bench squat  12# 1x15 even feet,   L bias 12# 1x15  R bias12# 1x15 - Bench squat  12# 1x15 even feet - Dead lift  30# bar 2x15 Dead lift  30# bar 2x15 Dead Lift  40# bar 2x15   Balance--Static Single-leg   10# weight carry ipsilateral, contralateral 10\"x3 each L and R - Single-leg static 3x10-15\" ea;   With 10# weight carry ipsilateral, contralateral 10\"x3 each L and R - AeroMats in normal stance x30\", stride L and R x30\" ea, tandem L and R 3x15\" each Single-leg static 3x10-15\" ea; With 12# weight carry ipsilateral, contralateral 10\"x3 each L and R Single-leg static 3x10-15\" ea; With 12# weight carry ipsilateral, contralateral 10\"x3 each L and R Single-leg eyes open 25-30\" ea, eyes closed, 3x5+\" each; tandem eyes closed 2x20-30\" ea. Single-leg with 10# weith   Balance--Dynamic Wobble board 4-ways x30 each;  12-in larry step over's 5x5 ea;  Step-to foam 3x5 ea, walk-to cecily 3x5 each, and step-to foam with 10# weight carry ipsilateral/contralateral 3x5 ea - Single stepping with 10# weight carry ipsilateral, contralateral 2x10 each L and R step-to foam with 12# weight carry ipsilateral/contralateral 3x10 ea Reactive control forward lunge step, firm   3x5 ea 12-in larry step-over's with 12# weight carry ipsilateral and contralateral 3x5 rep ea 12-in larry step-over's with 12# weight carry ipsilateral and contralateral 3x5 rep ea 12-in larry step-over's with 12# weight carry ipsilateral and contralateral 4x5 rep ea   Core Strength:   Upper ab curl up/sit up x20;  Prone ext L3  10\"x5;  Prone Plank 10\"x5; lower ab L. 1 x10;  Bridge 10\"x5 myokin 90/90 hip lift with pascale-bridge 3x10 - - - 90/90 hip lift with pascale-bridge 3x 5 breaths -   Pushing      7# cable press in split stance  2x15 L and R 10# cable press in split stance  3x15 L and R 13# cable press in split stance  2x15L and R    Pulling      13# cable pull in split stance 2x15 L and R 13# cable pull in split stance 2x15 L and R 17# cable pull in split stance 2x15 L and R   LIft and Carry      20# dumbbell, unilat carry over 30-ft ipsilat x2 L and R, contralat x2 L and R 20# dumbbell, unilat carry over 30-ft ipsilat x2 L and R, contralat x2 L and R 25# dumbbell over 2x30-ft  bilat lat, unilat ipsilat, unilat crossover lift  x3 ea     Treatment/Session Assessment:    · Response to Treatment: Continues with very good performance of the exercises done. Progressing with resistance.  No pain reported to L knee/LE with these. · Compliance with Program/Exercises: Appears compliant. · Recommendations/Intent for next treatment session: We will continue with knee motion treatment, progressive isolated and functional strengthening and control drills.     Total Treatment Duration: 45 minutes  PT Patient Time In/Time Out  Time In: 1340  Time Out: [place] [address], PT, MSPT, OCS

## 2017-08-25 ENCOUNTER — HOSPITAL ENCOUNTER (OUTPATIENT)
Dept: PHYSICAL THERAPY | Age: 67
Discharge: HOME | End: 2017-08-25
Payer: COMMERCIAL

## 2017-08-25 PROCEDURE — 97110 THERAPEUTIC EXERCISES: CPT

## 2017-08-25 NOTE — PROGRESS NOTES
Delia Humphrey  : 1950 [facility] VAIBHAV GOMEZ  [address] [facility], [address] [place] [address] [address], [address] [facility]  [phone]   [phone]       OUTPATIENT PHYSICAL THERAPY:Daily Note 2017      ICD-10: Treatment Diagnosis: Pain in left knee (M25.562); Difficulty in walking, not elsewhere classified (R26.2); Presence of left artificial knee joint (Z96.023)  Precautions/Allergies: post-op TKA precautions. Neosporin [hydrocortisone] and Pcn [penicillins]   Fall Risk Score: 2 (? 5 = High Risk)  MD Orders: Eval and Treat TKA rehab (17) MEDICAL/REFERRING DIAGNOSIS: Z25.139 History of knee replacement procedure L knee  History of knee replacement procedure of left knee [Z96.652]   DATE OF ONSET: 17  REFERRING PHYSICIAN: Phylicia Wiseman MD  RETURN PHYSICIAN APPOINTMENT: 17     PROGRESS ASSESSMENT (17):  Mr. Sandeep Mendoza has attended 21 visits to date. He is now nearly 3 months s/p L TKA. He continues to make very good progress. He has minimal pain reports to the L knee. He has good ROM at 0 to 125 deg flexion after motion treatment. He has good strength to the hip and knee, but is still weaker than the R. Functionally, he is doing well with basic mobility and walking skill on level ground. He still has mild limp on L noted on stairs and at times level ground, but this appears habitual vs antalgic. He demonstrates neuromuscular control deficits with static and dynamic balance, but these are improving. He is progressing toward his discharge goals. He will benefit from continued PT to further progress strength and balance before we discharge him to his . 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 He will needed guided re-strengthening for protection of the new joint. PROBLEM LIST (Impacting functional limitations):  1. Post-op pain and swelling L knee  2. Decreased ROM L knee   3.  Decreased functional strength L knee/LE   4. Decreased gait skills INTERVENTIONS PLANNED:  1. Thermal and electric modalities, manual therapies for pain. 2. Manual therapies and therapeutic exercises for ROM and strength. 3. Therapeutic exercises for gait and balance   TREATMENT PLAN:  Effective Dates: 6/7/2017 TO 9/6/2017. Frequency/Duration: 3 times a week for 4 weeks, then assess need for additional 4-8 weeks to progress strengthening toward discharge goals. GOALS: (Goals have been discussed and agreed upon with patient.)   Short-Term Functional Goals: Time Frame: 4-6 weeks*  1. Report no more than minimal, intermittent 3/10 pain L knee with basic walking and ADL's, and score greater than 40/80 on the LEFS. MET 7/6/17  2. L knee PROM is greater than or equal to 0-115 degrees flexion. MET 7-6-17  3. L quad strength is 5/5 with good terminal extension strength for stability with walking and progressing into strengthening phase. MET 7-6-17  4. Walking without limp on level surfaces and up stairs with reciprocal pattern. Met 8-7-17  Discharge Goals: Time Frame: 10-12 weeks   1. No more than 3/10 pain L knee with all normalized daily home, work, and  conditioning training activities, and score greater than 55/80 on the LEFS. Progressing and ngoing 8-7-17  2. Demonstrate good L knee and LE functional quad strength with good control walking down stairs, slopes Progressing and ngoing 8-7-17  3. Able to balance with good control in single-leg stand greater than 15 seconds with eyes open, greater than 10 seconds with eyes closed for improved dynamic stability. Progressing and ngoing 8-7-17  4. Independent with [facility] for advanced knee strengthening. Progressing and ngoing 8-7-17  Rehabilitation Potential For Stated Goals: Good              The information in this section was collected on 6-7-17 (except where otherwise noted). HISTORY:   History of Present Injury/Illness (Reason for Referral):  Long history of L knee pain beginning with a sports injury when in high school, then again in the [facility]. Worsening of knee pain and swelling limiting his activities over recent years. Was treated with injections and synovial fluid replacement with limited effects. DJD diagnosed and TKA recommended. This was done 5-17-17. He was hospitalized 2 days with [address] rehab, then discharged home with home health PT for 2 weeks. Past Medical History/Comorbidities: s/p L TKA 5-17-17. PMH from EMR includes:  Mr. Arely Osborne  has a past medical history of Benign prostatic hyperplasia; CAD (coronary artery disease); Depression; Diastolic dysfunction; Elevated serum creatinine (04/24/2017); Former smoker; H/O echocardiogram (07/24/2014); Hypercholesteremia; Hypertension; Osteoarthritis; and Overactive bladder. He also has no past medical history of Adverse effect of anesthesia; Difficult intubation; Malignant hyperthermia due to anesthesia; Nausea & vomiting; or Pseudocholinesterase deficiency. Mr. Arely Osborne  has a past surgical history that includes urological (2013) and colonoscopy. Social History/Living Environment: Lives with his wife. Home has a flight of stairs to his home office. Prior Level of Function/Work/Activity: Works in [email or web address] with frequent driving trips. Was active with walking 30 minutes daily and strength training with a  the past 4-5 years. 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 Current Medications:  Tylenol prn. Protonix and 2 anti-biotics for 2 weeks to finish 6/30/17. Other meds from EMR include:   Current Outpatient Prescriptions:     acetaminophen (TYLENOL) 500 mg tablet, Take 2 Tabs by mouth every six (6) hours. (Patient taking differently: Take 2 Tabs by mouth every six (6) hours.  Indications: Fever, Pain), Disp: 120 Tab, Rfl: 0    aspirin 81 mg chewable tablet, Take 1 Tab by mouth two (2) times a day., Disp: 60 Tab, Rfl: 0    cyclobenzaprine (FLEXERIL) 10 mg tablet, Take 0.5 Tabs by mouth three (3) times daily. , Disp: 60 Tab, Rfl: 0    senna-docusate (PERICOLACE) 8.6-50 mg per tablet, Take 2 Tabs by mouth daily. , Disp: 120 Tab, Rfl: 0    zolpidem (AMBIEN) 5 mg tablet, Take 1 Tab by mouth nightly as needed for Sleep. Max Daily Amount: 5 mg., Disp: 30 Tab, Rfl: 0    metoprolol tartrate (LOPRESSOR) 25 mg tablet, Take 25 mg by mouth two (2) times a day. Take DOS per anesthesia protocol., Disp: , Rfl:     sulindac (CLINORIL) 150 mg tablet, Take 150 mg by mouth two (2) times a day. Non-formulary. Patient instructed to bring to the hospital on the DOS, in the original bottle, and give to nurse., Disp: , Rfl:     olopatadine (PATANASE) 0.6 % spry, 2 Squirts by Both Nostrils route two (2) times a day. Non-formulary. Patient instructed to bring to the hospital on the DOS, in the original bottle, and give to nurse., Disp: , Rfl:     mirabegron ER (MYRBETRIQ) 50 mg ER tablet, Take 50 mg by mouth nightly. Non-formulary. Patient instructed to bring to the hospital on the DOS, in the original bottle, and give to nurse., Disp: , Rfl:     atorvastatin (LIPITOR) 40 mg tablet, Take 40 mg by mouth nightly., Disp: , Rfl:     lisinopril (PRINIVIL, ZESTRIL) 10 mg tablet, Take 10 mg by mouth daily. , Disp: , Rfl:     isosorbide mononitrate ER (IMDUR) 30 mg tablet, Take 30 mg by mouth daily. Take DOS per anesthesia protocol., Disp: , Rfl:     ezetimibe (ZETIA) 10 mg tablet, Take 10 mg by mouth daily. , Disp: , Rfl:     fenofibrate (LOFIBRA) 160 mg tablet, Take 160 mg by mouth daily. , Disp: , Rfl:     FLUoxetine (PROZAC) 20 mg capsule, Take 20 mg by mouth daily. Take DOS per anesthesia protocol., Disp: , Rfl:    Date Last Reviewed:  8-23-17      Number of Personal Factors/Comorbidities that affect the Plan of Care: 1-2: MODERATE COMPLEXITY   EXAMINATION:   8/25/2017  Observation/Orthostatic Postural Assessment: Walks into clinic without distress. Mild swelling to L knee/lower leg. ROM: Not measured. Strength: Not measured. Functional Mobility: Walking on level ground: Good knee mechanics on L, no significant limp on L. Stair walking: up/down with reciprocal pattern with mild limp on L stance phase. Balance:Static single leg balance, firm/eyes open:R=20\"         Body Structures Involved:  1. Joints  2. Muscles Body Functions Affected:  1. Neuromusculoskeletal  2. Movement Related Activities and Participation Affected:  1. General Tasks and Demands  2. Mobility   Number of elements (examined above) that affect the Plan of Care: 4+: HIGH COMPLEXITY   CLINICAL PRESENTATION:   Presentation: Stable and uncomplicated: LOW COMPLEXITY   CLINICAL DECISION MAKING:   Outcome Measure: Tool Used: Lower Extremity Functional Scale (LEFS)  Score:  Initial: 26/80 Most Recent: 57/80 (Date: 7-6-17 )   Interpretation of Score: 20 questions each scored on a 5 point scale with 0 representing \"extreme difficulty or unable to perform\" and 4 representing \"no difficulty\". The lower the score, the greater the functional disability. 80/80 represents no disability. Minimal detectable change is 9 points. Medical Necessity:   · Patient is expected to demonstrate progress in strength, range of motion and balance to return to independent basic mobility and progress to return to his recreational activity. .  Reason for Services/Other Comments:  · Patient continues to require skilled intervention due to the complexity of the surgical procedure, and need for safe, progressive rehab to return to his normal activity at home, work, and recreation actiivitie. Use of outcome tool(s) and clinical judgement create a POC that gives a: Clear prediction of patient's progress: LOW COMPLEXITY          TREATMENT:   (In addition to Assessment/Re-Assessment sessions the following treatments were rendered)  8/25/2017  Pre-treatment Symptoms/Complaints: Says his knee is doing well.  Was a little sore to the L posterior lateral knee after last time. But this went away. Plans to do a little trail hiking tomorrow. Pain: Initial:  No pain reported. Post Session: 0/10      Active warm up on bike. Therapeutic Exercise: (50 Minutes): Performed exercises for knee motion and whole LE flexibility with assisted Active Isolated Stretching posterior hip, lateral hip, hamstrings in 90/90 and SLR, and quads in side-lying and Gaston positions, 3\"x10 each. Performed exercises for stabilization, core and LQ strength as per gird. Exercises modified as indicated. Verbal and visual cues for exercise technique and alignment. HEP: He plan to do a short trail hike tomorrow. He is to do active recovery to LE's on Sunday if the does a long hike. He verbalizes understanding. Date  8-18-17 Date  8-23-17 Date  8-25-17   Activity/Exercise      Knee ROM/Flexibilty Active as above - Assisted as above   Seated hamstring curls - - -   Long arc quad  - - -   Slantboard calf stretch 30\"x3 ea - -   Step ups 12-in Step up+overhead reach 4x5 ea 14-in step up+overhead reach 3x5 ea 14-in step up+overhead reach to floor touch 3x5 ea   Side-lying hip ABD - - -   Standing HS curls - - -   Shuttle Press - - -   Squats Dead lift  30# bar 2x15 Dead Lift  40# bar 2x15 -   Balance--Static Single-leg static 3x10-15\" ea; With 12# weight carry ipsilateral, contralateral 10\"x3 each L and R Single-leg eyes open 25-30\" ea, eyes closed, 3x5+\" each; tandem eyes closed 2x20-30\" ea.   Single-leg with 10# weith Step to AeroMat 3\" hold x10 ea   Balance--Dynamic 12-in larry step-over's with 12# weight carry ipsilateral and contralateral 3x5 rep ea 12-in larry step-over's with 12# weight carry ipsilateral and contralateral 4x5 rep ea 2-in larry step-over's with 12# weight carry ipsilateral and contralateral 4x5 rep ea;  Reactive Control forward lunge step to firm x5 ea, to Bosu 2x10    Core Strength:  90/90 hip lift with pascale-bridge 3x 5 breaths - 90/90 hip lift with pascale-bridge 3x 3x10 rep   Pushing 10# cable press in split stance  3x15 L and R 13# cable press in split stance  2x15L and R  cable walk-outs 20# cable, double hand  x10   Pulling 13# cable pull in split stance 2x15 L and R 17# cable pull in split stance 2x15 L and R Cable walk-outs, double hand  30# x10   LIft and Carry 20# dumbbell, unilat carry over 30-ft ipsilat x2 L and R, contralat x2 L and R 25# dumbbell over 2x30-ft  bilat lat, unilat ipsilat, unilat crossover lift  x3 ea 25# dumbbell over 2x30-ft  bilat lat, unilat ipsilat, unilat crossover lift  x3 ea     Treatment/Session Assessment:    · Response to Treatment: Continues with very good performance of the exercises done. Progressing with resistance. No pain reported to L knee/LE with these. · Compliance with Program/Exercises: Appears compliant. · Recommendations/Intent for next treatment session: We will continue with knee motion treatment, progressive isolated and functional strengthening and control drills.   Review HEP next time and update to prep for discharge the end of next we  Total Treatment Duration: 50 minutes  PT Patient Time In/Time Out  Time In: 1300  Time Out: [address], PT, MSPT, OCS

## 2017-08-28 ENCOUNTER — HOSPITAL ENCOUNTER (OUTPATIENT)
Dept: PHYSICAL THERAPY | Age: 67
Discharge: HOME | End: 2017-08-28
Payer: COMMERCIAL

## 2017-08-28 PROCEDURE — 97110 THERAPEUTIC EXERCISES: CPT

## 2017-08-28 NOTE — PROGRESS NOTES
[facility]  : 1950 [facility] at [address] [place] [address] Rd  [address] [facility], [address] [place] [address] [address], [address] [facility]  [phone]   [phone]       OUTPATIENT PHYSICAL THERAPY:Daily Note 2017      ICD-10: Treatment Diagnosis: Pain in left knee (M25.562); Difficulty in walking, not elsewhere classified (R26.2); Presence of left artificial knee joint (F87.625)  Precautions/Allergies: post-op TKA precautions. Neosporin [hydrocortisone] and Pcn [penicillins]   Fall Risk Score: 2 (? 5 = High Risk)  MD Orders: Eval and Treat TKA rehab (17) MEDICAL/REFERRING DIAGNOSIS: D61.832 History of knee replacement procedure L knee  History of knee replacement procedure of left knee [Z96.652]   DATE OF ONSET: 17  REFERRING PHYSICIAN: Michelle Escobar MD  RETURN PHYSICIAN APPOINTMENT: 17     PROGRESS ASSESSMENT (17):  Mr. Lan Garcia has attended 21 visits to date. He is now nearly 3 months s/p L TKA. He continues to make very good progress. He has minimal pain reports to the L knee. He has good ROM at 0 to 125 deg flexion after motion treatment. He has good strength to the hip and knee, but is still weaker than the R. Functionally, he is doing well with basic mobility and walking skill on level ground. He still has mild limp on L noted on stairs and at times level ground, but this appears habitual vs antalgic. He demonstrates neuromuscular control deficits with static and dynamic balance, but these are improving. He is progressing toward his discharge goals. He will benefit from continued PT to further progress strength and balance before we discharge him to his . 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 He will needed guided re-strengthening for protection of the new joint. PROBLEM LIST (Impacting functional limitations):  1. Post-op pain and swelling L knee  2. Decreased ROM L knee   3.  Decreased functional strength L knee/LE   4. Decreased gait skills INTERVENTIONS PLANNED:  1. Thermal and electric modalities, manual therapies for pain. 2. Manual therapies and therapeutic exercises for ROM and strength. 3. Therapeutic exercises for gait and balance   TREATMENT PLAN:  Effective Dates: 6/7/2017 TO 9/6/2017. Frequency/Duration: 3 times a week for 4 weeks, then assess need for additional 4-8 weeks to progress strengthening toward discharge goals. GOALS: (Goals have been discussed and agreed upon with patient.)   Short-Term Functional Goals: Time Frame: 4-6 weeks*  1. Report no more than minimal, intermittent 3/10 pain L knee with basic walking and ADL's, and score greater than 40/80 on the LEFS. MET 7/6/17  2. L knee PROM is greater than or equal to 0-115 degrees flexion. MET 7-6-17  3. L quad strength is 5/5 with good terminal extension strength for stability with walking and progressing into strengthening phase. MET 7-6-17  4. Walking without limp on level surfaces and up stairs with reciprocal pattern. Met 8-7-17  Discharge Goals: Time Frame: 10-12 weeks   1. No more than 3/10 pain L knee with all normalized daily home, work, and  conditioning training activities, and score greater than 55/80 on the LEFS. Progressing and ngoing 8-7-17  2. Demonstrate good L knee and LE functional quad strength with good control walking down stairs, slopes Progressing and ngoing 8-7-17  3. Able to balance with good control in single-leg stand greater than 15 seconds with eyes open, greater than 10 seconds with eyes closed for improved dynamic stability. Progressing and ngoing 8-7-17  4. Independent with [facility] for advanced knee strengthening. Progressing and ngoing 8-7-17  Rehabilitation Potential For Stated Goals: Good              The information in this section was collected on 6-7-17 (except where otherwise noted). HISTORY:   History of Present Injury/Illness (Reason for Referral):  Long history of L knee pain beginning with a sports injury when in high school, then again in the [facility]. Worsening of knee pain and swelling limiting his activities over recent years. Was treated with injections and synovial fluid replacement with limited effects. DJD diagnosed and TKA recommended. This was done 5-17-17. He was hospitalized 2 days with [address] rehab, then discharged home with home health PT for 2 weeks. Past Medical History/Comorbidities: s/p L TKA 5-17-17. PMH from EMR includes:  Mr. Brandon Lake  has a past medical history of Benign prostatic hyperplasia; CAD (coronary artery disease); Depression; Diastolic dysfunction; Elevated serum creatinine (04/24/2017); Former smoker; H/O echocardiogram (07/24/2014); Hypercholesteremia; Hypertension; Osteoarthritis; and Overactive bladder. He also has no past medical history of Adverse effect of anesthesia; Difficult intubation; Malignant hyperthermia due to anesthesia; Nausea & vomiting; or Pseudocholinesterase deficiency. Mr. Brandon Lake  has a past surgical history that includes urological (2013) and colonoscopy. Social History/Living Environment: Lives with his wife. Home has a flight of stairs to his home office. Prior Level of Function/Work/Activity: Works in sales with frequent driving trips. Was active with walking 30 minutes daily and strength training with a  the past 4-5 years. 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 Current Medications:  Tylenol prn. Protonix and 2 anti-biotics for 2 weeks to finish 6/30/17. Other meds from EMR include:   Current Outpatient Prescriptions:     acetaminophen (TYLENOL) 500 mg tablet, Take 2 Tabs by mouth every six (6) hours. (Patient taking differently: Take 2 Tabs by mouth every six (6) hours.  Indications: Fever, Pain), Disp: 120 Tab, Rfl: 0    aspirin 81 mg chewable tablet, Take 1 Tab by mouth two (2) times a day., Disp: 60 Tab, Rfl: 0    cyclobenzaprine (FLEXERIL) 10 mg tablet, Take 0.5 Tabs by mouth three (3) times daily. , Disp: 60 Tab, Rfl: 0    senna-docusate (PERICOLACE) 8.6-50 mg per tablet, Take 2 Tabs by mouth daily. , Disp: 120 Tab, Rfl: 0    zolpidem (AMBIEN) 5 mg tablet, Take 1 Tab by mouth nightly as needed for Sleep. Max Daily Amount: 5 mg., Disp: 30 Tab, Rfl: 0    metoprolol tartrate (LOPRESSOR) 25 mg tablet, Take 25 mg by mouth two (2) times a day. Take DOS per anesthesia protocol., Disp: , Rfl:     sulindac (CLINORIL) 150 mg tablet, Take 150 mg by mouth two (2) times a day. Non-formulary. Patient instructed to bring to the hospital on the DOS, in the original bottle, and give to nurse., Disp: , Rfl:     olopatadine (PATANASE) 0.6 % spry, 2 Squirts by Both Nostrils route two (2) times a day. Non-formulary. Patient instructed to bring to the hospital on the DOS, in the original bottle, and give to nurse., Disp: , Rfl:     mirabegron ER (MYRBETRIQ) 50 mg ER tablet, Take 50 mg by mouth nightly. Non-formulary. Patient instructed to bring to the hospital on the DOS, in the original bottle, and give to nurse., Disp: , Rfl:     atorvastatin (LIPITOR) 40 mg tablet, Take 40 mg by mouth nightly., Disp: , Rfl:     lisinopril (PRINIVIL, ZESTRIL) 10 mg tablet, Take 10 mg by mouth daily. , Disp: , Rfl:     isosorbide mononitrate ER (IMDUR) 30 mg tablet, Take 30 mg by mouth daily. Take DOS per anesthesia protocol., Disp: , Rfl:     ezetimibe (ZETIA) 10 mg tablet, Take 10 mg by mouth daily. , Disp: , Rfl:     fenofibrate (LOFIBRA) 160 mg tablet, Take 160 mg by mouth daily. , Disp: , Rfl:     FLUoxetine (PROZAC) 20 mg capsule, Take 20 mg by mouth daily. Take DOS per anesthesia protocol., Disp: , Rfl:    Date Last Reviewed:  8-28-17      Number of Personal Factors/Comorbidities that affect the Plan of Care: 1-2: MODERATE COMPLEXITY   EXAMINATION:   8/28/2017  Observation/Orthostatic Postural Assessment: Walks into clinic without limp or distress.  NO more than trace to mild swelling to L knee/lower leg. ROM:   LLE AROM  L Knee Flexion: 120 (in sitting)  L Knee Extension: -10 (in sitting)  LLE PROM  L Knee Flexion: 125 (after motion treatment.)  L Knee Extension: 0 (after motion treatment)        Strength:    L hip and knee grossly 5/5 with manual testing. 5-Rep Max Testing (machine resisted): L Knee Extension=30#   L Knee Flexion=35#   L Leg Press=87.5#        5-Rep Max Testing (machine resisted):   R Knee Extension=35#   R Knee Flexion=50#   R Leg Jxsuf=080#      Functional Mobility: Walking on level ground: Good knee mechanics on L, no significant limp on L. Stair walking: up/down with reciprocal pattern with mild limp on L stance phase. Balance:Static single leg balance, firm/eyes open:R=20-30\", with eyes closed<5\". Body Structures Involved:  1. Joints  2. Muscles Body Functions Affected:  1. Neuromusculoskeletal  2. Movement Related Activities and Participation Affected:  1. General Tasks and Demands  2. Mobility   Number of elements (examined above) that affect the Plan of Care: 4+: HIGH COMPLEXITY   CLINICAL PRESENTATION:   Presentation: Stable and uncomplicated: LOW COMPLEXITY   CLINICAL DECISION MAKING:   Outcome Measure: Tool Used: Lower Extremity Functional Scale (LEFS)  Score:  Initial: 26/80 57/80 (Date: 7-6-17 ) Most Recent: X/80    Interpretation of Score: 20 questions each scored on a 5 point scale with 0 representing \"extreme difficulty or unable to perform\" and 4 representing \"no difficulty\". The lower the score, the greater the functional disability. 80/80 represents no disability. Minimal detectable change is 9 points. Medical Necessity:   · Patient is expected to demonstrate progress in strength, range of motion and balance to return to independent basic mobility and progress to return to his recreational activity. .  Reason for Services/Other Comments:  · Patient continues to require skilled intervention due to the complexity of the surgical procedure, and need for safe, progressive rehab to return to his normal activity at home, work, and recreation actiivitie. Use of outcome tool(s) and clinical judgement create a POC that gives a: Clear prediction of patient's progress: LOW COMPLEXITY          TREATMENT:   (In addition to Assessment/Re-Assessment sessions the following treatments were rendered)  8/28/2017  Pre-treatment Symptoms/Complaints: Says his knee is doing very well. Did a 2 hour hike on Saturday with a 10-20 lbs pack [facility] [place] No issues. Knees felt good and no soreness today from it all. Pain: Initial:  No pain reported. Post Session: 0/10      Active warm up on bike. Therapeutic Exercise: (50 Minutes): Performed exercises for knee motion and whole LE flexibility with assisted Active Isolated Stretching posterior hip, lateral hip, hamstrings in 90/90 and SLR, and quads in side-lying, prone, and Gaston positions, 3\"x10 each; physiologic knee extension and flexion hold/relax stretching, 3x30\" each. Performed exercises for balance and LQ strength as per gird. Exercises modified as indicated. Verbal and visual cues for exercise technique and alignment. HEP:  He is to do a leg workout at his gym on his own this week. He verbalizes understanding. Date  8-18-17 Date  8-23-17 Date  8-25-17 Date  8-28-17   Activity/Exercise       Knee ROM/Flexibilty Active as above - Assisted as above Assisted as above   Seated hamstring curls - - - 5-RM machine, unilat  L= 35#  R= 50#   Long arc quad  - - - 5-RM machine, unilat  L=30#  R=35#   Slantboard calf stretch 30\"x3 ea - - -   Step ups 12-in Step up+overhead reach 4x5 ea 14-in step up+overhead reach 3x5 ea 14-in step up+overhead reach to floor touch 3x5 ea -   Side-lying hip ABD - - - -   Standing HS curls - - - -   Shuttle Press - - - 5-RM unilat  L=87.5#  R=100#   Squats Dead lift  30# bar 2x15 Dead Lift  40# bar 2x15 - -   Balance--Static Single-leg static 3x10-15\" ea;   With 12# weight carry ipsilateral, contralateral 10\"x3 each L and R Single-leg eyes open 25-30\" ea, eyes closed, 3x5+\" each; tandem eyes closed 2x20-30\" ea. Single-leg with 10# weith Step to AeroMat 3\" hold x10 ea Single-leg eyes closed attempts x10 ea;  Eyes open 12# ipsilateral/ contralateral holds 3x15\"  ea   Balance--Dynamic 12-in larry step-over's with 12# weight carry ipsilateral and contralateral 3x5 rep ea 12-in larry step-over's with 12# weight carry ipsilateral and contralateral 4x5 rep ea 2-in larry step-over's with 12# weight carry ipsilateral and contralateral 4x5 rep ea;  Reactive Control forward lunge step to firm x5 ea, to Bosu 2x10  -   Core Strength:  90/90 hip lift with pascale-bridge 3x 5 breaths - 90/90 hip lift with pascale-bridge 3x 3x10 rep 90/90 hip lift with pascale-bridge 3x10 rep;  Quadruped opposites 5-6' x5 ea   Pushing 10# cable press in split stance  3x15 L and R 13# cable press in split stance  2x15L and R  cable walk-outs 20# cable, double hand  x10 -   Pulling 13# cable pull in split stance 2x15 L and R 17# cable pull in split stance 2x15 L and R Cable walk-outs, double hand  30# x10 -   LIft and Carry 20# dumbbell, unilat carry over 30-ft ipsilat x2 L and R, contralat x2 L and R 25# dumbbell over 2x30-ft  bilat lat, unilat ipsilat, unilat crossover lift  x3 ea 25# dumbbell over 2x30-ft  bilat lat, unilat ipsilat, unilat crossover lift  x3 ea -     Treatment/Session Assessment:    · Response to Treatment: Quick strength test with 5-rep max on our machine weights with less than 20% weakness to R knee/LE, except for hamstrings curls which were limited by a little pain. · Compliance with Program/Exercises: Appears compliant. · Recommendations/Intent for next treatment session: We will re-check systems, review HEP, and update to prep for discharge.   Total Treatment Duration: 50 minutes  PT Patient Time In/Time Out  Time In: 1345  Time Out: 3[address] [facility], PT, [facility]SPT, OCS

## 2017-09-01 ENCOUNTER — HOSPITAL ENCOUNTER (OUTPATIENT)
Dept: PHYSICAL THERAPY | Age: 67
Discharge: HOME | End: 2017-09-01
Payer: COMMERCIAL

## 2017-09-01 PROCEDURE — 97110 THERAPEUTIC EXERCISES: CPT

## 2017-09-01 NOTE — PROGRESS NOTES
Jennie Haines  : 1950 [facility] VAIBHAV GOMEZ  [address] [facility], [address] [place] [address],[address], [place] [address].  [phone]   [phone]       OUTPATIENT PHYSICAL THERAPY:Daily Note and Progress Report 2017      ICD-10: Treatment Diagnosis: Pain in left knee (M25.562); Difficulty in walking, not elsewhere classified (R26.2); Presence of left artificial knee joint (C04.033)  Precautions/Allergies: post-op TKA precautions. Neosporin [hydrocortisone] and Pcn [penicillins]   Fall Risk Score: 2 (? 5 = High Risk)  MD Orders: Eval and Treat TKA rehab (17) MEDICAL/REFERRING DIAGNOSIS: X42.092 History of knee replacement procedure L knee  History of knee replacement procedure of left knee [Z96.652]   DATE OF ONSET: 17  REFERRING PHYSICIAN: Sheryle Bearded, MD  RETURN PHYSICIAN APPOINTMENT: 17     PROGRESS ASSESSMENT (17):  Mr. Traci Barreto has attended 28 visits to date. He is now nearly 3 1/2 months s/p L TKA. He continue to make very good progress. He has minimal pain reports. He has good ROM still. Strength to the L knee and LE continues to progress. He is testing well on the LEFS outcome tool and with specific and functional strength tests. He is independent with a HEP for strength and endurance conditioning to prep for his [place] trip next month and feels confident to continue conditioning on his own. He has met all of his goals at this time. He appears ready for discharge. PROBLEM LIST (Impacting functional limitations):  1. Post-op pain and swelling L knee  2. Decreased ROM L knee   3. Decreased functional strength L knee/LE   4. Decreased gait skills INTERVENTIONS PLANNED:  1. Thermal and electric modalities, manual therapies for pain. 2. Manual therapies and therapeutic exercises for ROM and strength. 3. Therapeutic exercises for gait and balance   TREATMENT PLAN:  Effective Dates: 2017 TO 2017.   Frequency/Duration: 3 times a week for 4 weeks, then assess need for additional 4-8 weeks to progress strengthening toward discharge goals. GOALS: (Goals have been discussed and agreed upon with patient.)   Short-Term Functional Goals: Time Frame: 4-6 weeks*  1. Report no more than minimal, intermittent 3/10 pain L knee with basic walking and ADL's, and score greater than 40/80 on the LEFS. MET 7/6/17  2. L knee PROM is greater than or equal to 0-115 degrees flexion. MET 7-6-17  3. L quad strength is 5/5 with good terminal extension strength for stability with walking and progressing into strengthening phase. MET 7-6-17  4. Walking without limp on level surfaces and up stairs with reciprocal pattern. Met 8-7-17  Discharge Goals: Time Frame: 10-12 weeks   1. No more than 3/10 pain L knee with all normalized daily home, work, and  conditioning training activities, and score greater than 55/80 on the LEFS. Met 9-1-17  2. Demonstrate good L knee and LE functional quad strength with good control walking down stairs, slopes Met 9-1-17  3. Able to balance with good control in single-leg stand greater than 15 seconds with eyes open, greater than 10 seconds with eyes closed for improved dynamic stability. Met 9-1-17  4. Independent with [facility] for advanced knee strengthening. Met 9-1-17  Rehabilitation Potential For Stated Goals: Good              The information in this section was collected on 6-7-17 (except where otherwise noted). HISTORY:   History of Present Injury/Illness (Reason for Referral): Long history of L knee pain beginning with a sports injury when in high school, then again in the [facility]. Worsening of knee pain and swelling limiting his activities over recent years. Was treated with injections and synovial fluid replacement with limited effects. DJD diagnosed and TKA recommended. This was done 5-17-17. He was hospitalized 2 days with [address] [place] [address] rehab, then discharged home with home health PT for 2 weeks. Past Medical History/Comorbidities: s/p L TKA 5-17-17.  PMH from EMR includes:  Mr. Odalis Strauss  has a past medical history of Benign prostatic hyperplasia; CAD (coronary artery disease); Depression; Diastolic dysfunction; Elevated serum creatinine (04/24/2017); Former smoker; H/O echocardiogram (07/24/2014); Hypercholesteremia; Hypertension; Osteoarthritis; and Overactive bladder. He also has no past medical history of Adverse effect of anesthesia; Difficult intubation; Malignant hyperthermia due to anesthesia; Nausea & vomiting; or Pseudocholinesterase deficiency. Mr. Odalis Strauss  has a past surgical history that includes urological (2013) and colonoscopy. Social History/Living Environment: Lives with his wife. Home has a flight of stairs to his home office. Prior Level of Function/Work/Activity: Works in sales with frequent driving trips. Was active with walking 30 minutes daily and strength training with a  the past 4-5 years. Active with hunting, and works as a guide in [employer]. 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 Current Medications:  Tylenol prn. Protonix and 2 anti-biotics for 2 weeks to finish 6/30/17. Other meds from EMR include:   Current Outpatient Prescriptions:     acetaminophen (TYLENOL) 500 mg tablet, Take 2 Tabs by mouth every six (6) hours. (Patient taking differently: Take 2 Tabs by mouth every six (6) hours. Indications: Fever, Pain), Disp: 120 Tab, Rfl: 0    aspirin 81 mg chewable tablet, Take 1 Tab by mouth two (2) times a day., Disp: 60 Tab, Rfl: 0    cyclobenzaprine (FLEXERIL) 10 mg tablet, Take 0.5 Tabs by mouth three (3) times daily. , Disp: 60 Tab, Rfl: 0    senna-docusate (PERICOLACE) 8.6-50 mg per tablet, Take 2 Tabs by mouth daily. , Disp: 120 Tab, Rfl: 0    zolpidem (AMBIEN) 5 mg tablet, Take 1 Tab by mouth nightly as needed for Sleep. Max Daily Amount: 5 mg., Disp: 30 Tab, Rfl: 0    metoprolol tartrate (LOPRESSOR) 25 mg tablet, Take 25 mg by mouth two (2) times a day.  Take DOS per anesthesia protocol., Disp: , Rfl:     sulindac (CLINORIL) 150 mg tablet, Take 150 mg by mouth two (2) times a day. Non-formulary. Patient instructed to bring to the hospital on the DOS, in the original bottle, and give to nurse., Disp: , Rfl:     olopatadine (PATANASE) 0.6 % spry, 2 Squirts by Both Nostrils route two (2) times a day. Non-formulary. Patient instructed to bring to the hospital on the DOS, in the original bottle, and give to nurse., Disp: , Rfl:     mirabegron ER (MYRBETRIQ) 50 mg ER tablet, Take 50 mg by mouth nightly. Non-formulary. Patient instructed to bring to the hospital on the DOS, in the original bottle, and give to nurse., Disp: , Rfl:     atorvastatin (LIPITOR) 40 mg tablet, Take 40 mg by mouth nightly., Disp: , Rfl:     lisinopril (PRINIVIL, ZESTRIL) 10 mg tablet, Take 10 mg by mouth daily. , Disp: , Rfl:     isosorbide mononitrate ER (IMDUR) 30 mg tablet, Take 30 mg by mouth daily. Take DOS per anesthesia protocol., Disp: , Rfl:     ezetimibe (ZETIA) 10 mg tablet, Take 10 mg by mouth daily. , Disp: , Rfl:     fenofibrate (LOFIBRA) 160 mg tablet, Take 160 mg by mouth daily. , Disp: , Rfl:     FLUoxetine (PROZAC) 20 mg capsule, Take 20 mg by mouth daily. Take DOS per anesthesia protocol., Disp: , Rfl:    Date Last Reviewed:  8-28-17      Number of Personal Factors/Comorbidities that affect the Plan of Care: 1-2: MODERATE COMPLEXITY   EXAMINATION:   9/1/2017  Observation/Orthostatic Postural Assessment: Walks into clinic without limp or distress. No more than mild swelling to L knee/lower leg. ROM:     LLE AROM  L Knee Flexion: 120 (in sitting)  L Knee Extension: -10 (in sitting)  LLE PROM  L Knee Flexion: 125 (after motion treatment.)  L Knee Extension: 0 (after motion treatment)        Strength:    L hip and knee grossly 5/5 with manual testing. 5-Rep Max Testing (machine resisted):    L Knee Extension=30#   L Knee Flexion=35#   L Leg Press=87.5#        5-Rep Max Testing (machine resisted):   R Knee Extension=35#   R Knee Flexion=50#   R Leg Absmp=203#      Functional Mobility: Floor Transfer: compensatory independent  Walking on level ground: Good knee mechanics on L, no significant limp on L. Stair walking: up/down with reciprocal pattern with mild limp on L stance phase. Balance:Static single leg balance, firm/eyes open L=20\" with fair to good control; eyes closed <5\". Body Structures Involved:  1. Joints  2. Muscles Body Functions Affected:  1. Neuromusculoskeletal  2. Movement Related Activities and Participation Affected:  1. General Tasks and Demands  2. Mobility   Number of elements (examined above) that affect the Plan of Care: 4+: HIGH COMPLEXITY   CLINICAL PRESENTATION:   Presentation: Stable and uncomplicated: LOW COMPLEXITY   CLINICAL DECISION MAKING:   Outcome Measure: Tool Used: Lower Extremity Functional Scale (LEFS)  Score:  Initial: 26/80 57/80 (Date: 7-6-17 ) Most Recent: 71/80 (9-1-17)   Interpretation of Score: 20 questions each scored on a 5 point scale with 0 representing \"extreme difficulty or unable to perform\" and 4 representing \"no difficulty\". The lower the score, the greater the functional disability. 80/80 represents no disability. Minimal detectable change is 9 points. Reason for Services/Other Comments:  · Plan to discharge to [facility] at this time. .   Use of outcome tool(s) and clinical judgement create a POC that gives a: Clear prediction of patient's progress: LOW COMPLEXITY          TREATMENT:   (In addition to Assessment/Re-Assessment sessions the following treatments were rendered)  9/1/2017  Pre-treatment Symptoms/Complaints: Says his knee is doing good this week. No pain to report. He did do leg exercises at the gym on Wednesday. No issues. Feels good about continuing on his own. Only thing to report is pain with kneeling on L knee and getting up off the floor. Pain: Initial:  No pain reported. Post Session: 0/10      Active warm up on bike.    Therapeutic Exercise: (35 Minutes): Performed exercises for knee motion and whole LE flexibility with assisted Active Isolated Stretching posterior hip, lateral hip, hamstrings in 90/90 and SLR, and quads in side-lying, prone, and Gaston positions, 3\"x10 each; physiologic knee extension and flexion hold/relax stretching, 3x30\" each. Performed exercises for LQ strength, including modified Get Up's from floor to 1/2-knee x3 and 1/2 to stand 3; then did LE resistance machines on his own. HEP:  Verbal review of HEP. He can add the get up move done today. He verbalizes understanding. Treatment/Session Assessment:    · Response to Treatment: Good understanding and comfort with his HEP to continue. · Compliance with Program/Exercises: Appears compliant. · Recommendations/Intent for next treatment session: Plan to discharge to HEP now.   Total Treatment Duration: 35 minutes  PT Patient Time In/Time Out  Time In: 1300  Time Out: [facility], PT, MSPT, OCS

## 2017-09-01 NOTE — PROGRESS NOTES
Nadeem Govea  : 1950 [facility] at [address],  Celeste Carnes, [address] [place] [address]  [phone]   [phone]       OUTPATIENT PHYSICAL THERAPY:Discharge 2017      ICD-10: Treatment Diagnosis: Pain in left knee (M25.562); Difficulty in walking, not elsewhere classified (R26.2); Presence of left artificial knee joint (S46.262)  Precautions/Allergies: post-op TKA precautions. Neosporin [hydrocortisone] and Pcn [penicillins]   Fall Risk Score: 2 (? 5 = High Risk)  MD Orders: Eval and Treat TKA rehab (17) MEDICAL/REFERRING DIAGNOSIS: Z59.509 History of knee replacement procedure L knee  DATE OF ONSET: 17  REFERRING PHYSICIAN: Kim Ulrich MD  RETURN PHYSICIAN APPOINTMENT: 17     DISCHARGE ASSESSMENT (17):  Mr. Andrade Manjarrez has attended 28 visits to date. He is now nearly 3 1/2 months s/p L TKA. He continue to make very good progress. He has minimal pain reports. He has good ROM still. Strength to the L knee and LE continues to progress. He is testing well on the LEFS outcome tool and with specific and functional strength tests. He is independent with a HEP for strength and endurance conditioning to prep for his [place] trip next month and feels confident to continue conditioning on his own. He has met all of his goals at this time. He appears ready for discharge. GOALS: (Goals have been discussed and agreed upon with patient.)   Short-Term Functional Goals: Time Frame: 4-6 weeks*  1. Report no more than minimal, intermittent 3/10 pain L knee with basic walking and ADL's, and score greater than 40/80 on the LEFS. MET 17  2. L knee PROM is greater than or equal to 0-115 degrees flexion. MET 17  3. L quad strength is 5/5 with good terminal extension strength for stability with walking and progressing into strengthening phase. MET 17  4. Walking without limp on level surfaces and up stairs with reciprocal pattern.  Met 17  Discharge Goals: Time Frame: 10-12 weeks   1. No more than 3/10 pain L knee with all normalized daily home, work, and  conditioning training activities, and score greater than 55/80 on the LEFS. Met 9-1-17  2. Demonstrate good L knee and LE functional quad strength with good control walking down stairs, slopes Met 9-1-17  3. Able to balance with good control in single-leg stand greater than 15 seconds with eyes open, greater than 10 seconds with eyes closed for improved dynamic stability. Met 9-1-17  4. Independent with [facility] for advanced knee strengthening. Met 9-1-17    EXAMINATION:   Observation/Orthostatic Postural Assessment: Walks into clinic without limp or distress. No more than mild swelling to L knee/lower leg. ROM:     LLE AROM  L Knee Flexion: 120 (in sitting)  L Knee Extension: -10 (in sitting)  LLE PROM  L Knee Flexion: 125 (after motion treatment.)  L Knee Extension: 0 (after motion treatment)        Strength:    L hip and knee grossly 5/5 with manual testing. 5-Rep Max Testing (machine resisted): L Knee Extension=30#   L Knee Flexion=35#   L Leg Press=87.5#    5-Rep Max Testing (machine resisted):   R Knee Extension=35#   R Knee Flexion=50#   R Leg Jzrdx=484#      Functional Mobility: Floor Transfer: compensatory independent  Walking on level ground: Good knee mechanics on L, no significant limp on L. Stair walking: up/down with reciprocal pattern with mild limp on L stance phase. Balance:Static single leg balance, firm/eyes open L=20\" with fair to good control; eyes closed <5\". \    Outcome Measure: Tool Used: Lower Extremity Functional Scale (LEFS)  Score:  Initial: 26/80 57/80 (Date: 7-6-17 ) Most Recent: 71/80 (9-1-17)   Interpretation of Score: 20 questions each scored on a 5 point scale with 0 representing \"extreme difficulty or unable to perform\" and 4 representing \"no difficulty\". The lower the score, the greater the functional disability. 80/80 represents no disability.   Minimal detectable change is 9 points. RECOMMENDATION: I will plan to discharge Mr. Rousseau from Physical Therapy at this time. He is to continue with his HEP as instructed. He is to follow up with Dr. Aure Denton as ordered. I will be happy to follow up with him if there is a need for Physical Therapy in the future. Thank you for the opportunity to serve this client.       Paul Hunter, PT, MSPT, OCS

## 2022-03-18 PROBLEM — R79.89 ELEVATED SERUM CREATININE: Status: ACTIVE | Noted: 2017-04-24

## 2022-03-19 PROBLEM — R94.4 DECREASED GFR: Status: ACTIVE | Noted: 2017-04-24

## 2022-03-19 PROBLEM — M17.9 OA (OSTEOARTHRITIS) OF KNEE: Status: ACTIVE | Noted: 2017-05-17

## 2022-06-17 ENCOUNTER — OFFICE VISIT (OUTPATIENT)
Dept: ORTHOPEDIC SURGERY | Age: 72
End: 2022-06-17
Payer: MEDICARE

## 2022-06-17 DIAGNOSIS — M25.551 RIGHT HIP PAIN: Primary | ICD-10-CM

## 2022-06-17 DIAGNOSIS — M70.61 TROCHANTERIC BURSITIS OF RIGHT HIP: ICD-10-CM

## 2022-06-17 PROCEDURE — 1123F ACP DISCUSS/DSCN MKR DOCD: CPT | Performed by: NURSE PRACTITIONER

## 2022-06-17 PROCEDURE — 99213 OFFICE O/P EST LOW 20 MIN: CPT | Performed by: NURSE PRACTITIONER

## 2022-06-17 PROCEDURE — 3017F COLORECTAL CA SCREEN DOC REV: CPT | Performed by: NURSE PRACTITIONER

## 2022-06-17 PROCEDURE — 20610 DRAIN/INJ JOINT/BURSA W/O US: CPT | Performed by: NURSE PRACTITIONER

## 2022-06-17 PROCEDURE — G8428 CUR MEDS NOT DOCUMENT: HCPCS | Performed by: NURSE PRACTITIONER

## 2022-06-17 PROCEDURE — 4004F PT TOBACCO SCREEN RCVD TLK: CPT | Performed by: NURSE PRACTITIONER

## 2022-06-17 PROCEDURE — G8421 BMI NOT CALCULATED: HCPCS | Performed by: NURSE PRACTITIONER

## 2022-06-17 RX ORDER — METHYLPREDNISOLONE ACETATE 40 MG/ML
40 INJECTION, SUSPENSION INTRA-ARTICULAR; INTRALESIONAL; INTRAMUSCULAR; SOFT TISSUE ONCE
Status: COMPLETED | OUTPATIENT
Start: 2022-06-17 | End: 2022-06-17

## 2022-06-17 RX ADMIN — METHYLPREDNISOLONE ACETATE 40 MG: 40 INJECTION, SUSPENSION INTRA-ARTICULAR; INTRALESIONAL; INTRAMUSCULAR; SOFT TISSUE at 11:05

## 2022-06-17 NOTE — PATIENT INSTRUCTIONS
Bursitis: Care Instructions  Your Care Instructions     A bursa is a small sac of fluid that helps the tissues around a joint slide over one another easily. Injury or overuse of a joint can cause pain, redness, and inflammation in the bursa (bursitis). Bursitis usually gets better if you avoid the activity that caused it. You can help prevent bursitis from coming back by doing stretching and strengthening exercises. You may also need tochange the way you do some activities. Follow-up care is a key part of your treatment and safety. Be sure to make and go to all appointments, and call your doctor if you are having problems. It's also a good idea to know your test results and keep alist of the medicines you take. How can you care for yourself at home?  Put ice or a cold pack on the area for 10 to 20 minutes at a time. Try to do this every 1 to 2 hours for the next 3 days (when you are awake) or until the swelling goes down. Put a thin cloth between the ice and your skin.  After the 3 days of using ice, you may use heat on the area. You can use a hot water bottle; a warm, moist towel; or a heating pad set on low. You can also try alternating heat and ice.  Rest the area where you have pain. Stop any activities that cause pain. Switch to activities that do not stress the area.  Take pain medicines exactly as directed. ? If the doctor gave you a prescription medicine for pain, take it as prescribed. ? If you are not taking a prescription pain medicine, ask your doctor if you can take an over-the-counter medicine. ? Do not take two or more pain medicines at the same time unless the doctor told you to. Many pain medicines have acetaminophen, which is Tylenol. Too much acetaminophen (Tylenol) can be harmful.  To prevent stiffness, gently move the joint as much as you can without pain every day. As the pain gets better, keep doing range-of-motion exercises.  Ask your doctor for exercises that will make the muscles around the joint stronger. Do these as directed.  You can slowly return to the activity that caused the pain, but do it with less effort until you can do it without pain or swelling. Be sure to warm up before and stretch after you do the activity. When should you call for help? Call your doctor now or seek immediate medical care if:     You have new or worse symptoms of infection, such as:  ? Increased pain, swelling, warmth, or redness. ? Red streaks leading from the area. ? Pus draining from the area. ? A fever. Watch closely for changes in your health, and be sure to contact your doctor if:     You do not get better as expected. Where can you learn more? Go to https://[email or web address].[email or web address]. org and sign in to your [employer] account. Enter O073 in the [employer] box to learn more about \"Bursitis: Care Instructions. \"     If you do not have an account, please click on the \"Sign Up Now\" link. Current as of: July 1, 2021               Content Version: 13.2  © 2006-2022 Healthwise[employer] Inc[employer]. Care instructions adapted under license by [facility] ([facility]). If you have questions about a medical condition or this instruction, always ask your healthcare professional. [address] any warranty or liability for your use of this information.

## 2022-06-17 NOTE — PROGRESS NOTES
Patient ID:    Winsome Moreno  [id number]  13 y.o.  1950    Today: June 17, 2022      Chief Complaint: Right hip pain        Patient reports longstanding history of right hip pain. The pain is predominately localized to the lateral hip and is characterized as a general ache with occasional sharp pain. They rate the pain ranging from 3-8 on the pain scale occurring in a cyclical fashion with periods of acute exacerbation. Symptoms are exacerbated with attempting to sleep on the hip, attempting to ascend stairs, and sitting for long periods of time which results in lateral hip pain. Patient denies significant anterior groin pain and denies significant issues with attempting to put on socks and shoes or when attempting to end into or out or a vehicle. No numbness of tingling going down the extremity. Patient has attempted prior conservative treatment including Trochanteric Bursitis Injection. Past Medical History:  Past Medical History:   Diagnosis Date    Benign prostatic hyperplasia     Followed y Dr. Nia GREGORY (coronary artery disease)     followed by [facility]     Depression     Diastolic dysfunction     Grade 1     Elevated serum creatinine 04/24/2017    Creatinine 2.01    Former smoker     H/O echocardiogram 07/24/2014    EF 60-65%    Hypercholesteremia     Hypertension     Osteoarthritis     Overactive bladder        Past Surgical History:  Past Surgical History:   Procedure Laterality Date    COLONOSCOPY      with polyp removal-benign     UROLOGICAL SURGERY  2013    Greenlight laser of prostate.          Medications:     Prior to Admission medications    Not on File       Family History:     Family History   Problem Relation Age of Onset    Heart Disease Father     Hypertension Father        Social History:      Social History     Tobacco Use    Smoking status: Former Smoker     Packs/day: 1.00    Smokeless tobacco: Never Used    Tobacco comment: Quit smoking: quit 1998   Substance Use Topics    Alcohol use: No         Allergies:    Not on File       ROS:  Patient Health Form has been filled out, reviewed, signed and included in the chart. ROS findings related to musculoskeletal problems were discussed with the patient. Patient advised to discuss non-orthopedic complaints with primary care physician. Objective:     Vitals: There were no vitals taken for this visit. General: Awake and alert. AAOx3. The patient ambulates with an antalgic gait. Psych: Mood appropriate  HEENT: Normocephalic, atraumatic  Neck: Supple  CV/Pulm: Breathing even and unlabored  Abdomen: Nondistended  Circulation: Normal without obvious arterial or venous deficiency. Pulses palpable bilateral lower extremities. Lymphatic: No obvious lymphedema or lymphadenopathy noted. Skin: No obvious lacerations or rashes noted. Musculoskeletal: No obvious deformity or pain with active movement of the upper extremities. Neuro: No obvious deficiency or weakness noted of the upper or lower extremities    Hip Exam:   Exam of the hip reveals tenderness to palpation over the trochanter. Minimal groin pain with resisted leg raise and fadir testing. Motion in preserved in the involved hip. No evidence of arterial of venous insufficiency. DP/PT pulses appreciable. Able to plantar- and dorsi-flex the foot/ankle. Imaging:     Images obtained either today or prior    XR Pelvis 2/3 Views  Views Obtained: AP pelvis, lateral hip right  Indication: Right Hip Pain  Findings: A/P Pelvis and lateral of the hip(s) were reviewed which demonstrate no obvious arthritic changes of the hip. There is no osteophyte formation around the acetabulum and femoral head. No obvious subchondral sclerosis noted. No obvious fracture appreciated. There is no acute bony abnormality such as malignancy appreciated. Impression: Normal appearing hip without evidence of advanced OA.     Mariaa Loredo, APRN - CNP      Assessment:   Trochanteric Bursitis of the  Right Hip    Plan:  Differential diagnosis and treatment options have been discussed with the patient. Risks, benefits and alternatives of each were discussed and patient questions answered. We discussed oral anti-inflammatory medications, IT band stretching, physical therapy and corticosteroid injections. We discussed that this generally is not a surgically managed condition. At this point the patient would like to proceed with Trochanteric bursal injection. We discussed potential risks of steroid injection including but not limited to steroid flare with an associated increase in pain, fat necrosis, skin discoloration around the injection site, temporary increase in blood sugars in diabetic patients and possible facial flushing after injection. Treatment Rendered/ Procedure Note:    After discussion of risks, benefits and alternatives the patient wished to proceed with trochanteric injection. After prepping the area the area overlying the trochanteric bursal region over the right hip was injected with 1cc of 40mg DepoMedrol along with 2-3cc of 0.25% marcaine. The patient tolerated the procedure without significant problem. RECOMMENDATIONS:    We discussed the importance of IT band stretching. Stretching needs to be done 2-3 times daily for a period of 6 weeks followed by maintenance stretching to be done 1-2 times daily for the foreseeable future. A handout discussing trochanteric bursitis along with stretching maneuvers is provided to the patient today. If the patient is still having significant issues/pain after 4-6 weeks can call for followup. If the patient fails to see any relief from treatment we may opt to evaluate the patient's hip and/or lumbar spine more closely. Otherwise the patient call followup as needed.

## 2023-05-10 RX ORDER — FLUOXETINE HYDROCHLORIDE 20 MG/1
20 CAPSULE ORAL DAILY
COMMUNITY

## 2023-05-10 RX ORDER — SULINDAC 150 MG/1
150 TABLET ORAL 2 TIMES DAILY
COMMUNITY

## 2023-05-10 RX ORDER — EZETIMIBE 10 MG/1
10 TABLET ORAL DAILY
COMMUNITY

## 2023-05-10 RX ORDER — LISINOPRIL 10 MG/1
10 TABLET ORAL DAILY
COMMUNITY

## 2023-05-10 RX ORDER — ATORVASTATIN CALCIUM 40 MG/1
40 TABLET, FILM COATED ORAL NIGHTLY
COMMUNITY

## 2023-05-10 RX ORDER — OLOPATADINE HYDROCHLORIDE 665 UG/1
2 SPRAY NASAL 2 TIMES DAILY
COMMUNITY

## 2023-05-10 RX ORDER — FENOFIBRATE 160 MG/1
160 TABLET ORAL DAILY
COMMUNITY

## 2023-05-10 RX ORDER — ISOSORBIDE MONONITRATE 30 MG/1
30 TABLET, EXTENDED RELEASE ORAL DAILY
COMMUNITY

## (undated) DEVICE — (D)PREP SKN CHLRAPRP APPL 26ML -- CONVERT TO ITEM 371833

## (undated) DEVICE — T4 HOOD

## (undated) DEVICE — SOLUTION IV 1000ML 0.9% SOD CHL

## (undated) DEVICE — SUTURE VCRL SZ 2-0 L27IN ABSRB UD L36MM CP-1 1/2 CIR REV J266H

## (undated) DEVICE — SYR LR LCK 1ML GRAD NSAF 30ML --

## (undated) DEVICE — 3000CC GUARDIAN II: Brand: GUARDIAN

## (undated) DEVICE — (D)SYR 10ML 1/5ML GRAD NSAF -- PKGING CHANGE USE ITEM 338027

## (undated) DEVICE — TRAY PREP DRY W/ PREM GLV 2 APPL 6 SPNG 2 UNDPD 1 OVERWRAP

## (undated) DEVICE — AMD ANTIMICROBIAL NON-ADHERENT PAD,0.2% POLYHEXAMETHYLENE BIGUANIDE HCI (PHMB): Brand: TELFA

## (undated) DEVICE — REM POLYHESIVE ADULT PATIENT RETURN ELECTRODE: Brand: VALLEYLAB

## (undated) DEVICE — NEEDLE HYPO 21GA L1.5IN INTRAMUSCULAR S STL LATCH BVL UP

## (undated) DEVICE — SUTURE STRATAFIX SPRL SZ 1 L5IN ABSRB VLT CT-1 L36MM 1/2 SXPD2B401

## (undated) DEVICE — JELLY LUBRICATING 10GM PREFIL SYR LUBE

## (undated) DEVICE — STERILE PRESSURE PROTECTOR PAD® FOR DE MAYO UNIVERSAL DISTRACTOR® (10/CASE): Brand: DE MAYO UNIVERSAL DISTRACTOR®

## (undated) DEVICE — DRAPE,TOP,102X53,STERILE: Brand: MEDLINE

## (undated) DEVICE — NEEDLE SPNL 22GA L3.5IN BLK HUB S STL REG WALL FIT STYL W/

## (undated) DEVICE — PACK PROCEDURE SURG TOT KNEE

## (undated) DEVICE — Device: Brand: STABLECUT®

## (undated) DEVICE — 2000CC GUARDIAN II: Brand: GUARDIAN

## (undated) DEVICE — MEDI-VAC YANKAUER SUCTION HANDLE W/BULBOUS TIP: Brand: CARDINAL HEALTH

## (undated) DEVICE — BUTTON SWITCH PENCIL BLADE ELECTRODE, HOLSTER: Brand: EDGE

## (undated) DEVICE — ABDOMINAL PAD: Brand: DERMACEA

## (undated) DEVICE — SUTURE FIBERWIRE SZ 2 W/ TAPERED NEEDLE BLUE L38IN NONABSORB BLU L26.5MM 1/2 CIRCLE AR7200

## (undated) DEVICE — BASIC SINGLE BASIN BTC-LF: Brand: MEDLINE INDUSTRIES, INC.

## (undated) DEVICE — SUTURE STRATAFIX SZ 3-0 L30CM NONABSORBABLE UD L19MM FS-2 SXMP2B408

## (undated) DEVICE — SYSTEM SKIN CLSR 22CM DERMBND PRINEO

## (undated) DEVICE — FAN SPRAY KIT: Brand: PULSAVAC®

## (undated) DEVICE — CONTAINER,SPECIMEN,O.R.STRL,4.5OZ: Brand: MEDLINE

## (undated) DEVICE — TRAY CATH 16F DRN BG LTX -- CONVERT TO ITEM 363158

## (undated) DEVICE — SOLUTION IRRIG 3000ML 0.9% SOD CHL FLX CONT 0797208] ICU MEDICAL INC]

## (undated) DEVICE — Z DISCONTINUED USE 2423037 APPLICATOR MEDICATED 3 CC CHLORHEXIDINE GLUC 2% CHLORAPREP